# Patient Record
Sex: FEMALE | Race: BLACK OR AFRICAN AMERICAN | Employment: OTHER | URBAN - METROPOLITAN AREA
[De-identification: names, ages, dates, MRNs, and addresses within clinical notes are randomized per-mention and may not be internally consistent; named-entity substitution may affect disease eponyms.]

---

## 2018-01-09 ENCOUNTER — APPOINTMENT (OUTPATIENT)
Dept: GENERAL RADIOLOGY | Age: 73
DRG: 270 | End: 2018-01-09
Attending: EMERGENCY MEDICINE
Payer: COMMERCIAL

## 2018-01-09 ENCOUNTER — APPOINTMENT (OUTPATIENT)
Dept: CT IMAGING | Age: 73
DRG: 270 | End: 2018-01-09
Attending: EMERGENCY MEDICINE
Payer: COMMERCIAL

## 2018-01-09 ENCOUNTER — HOSPITAL ENCOUNTER (INPATIENT)
Age: 73
LOS: 3 days | Discharge: HOME | DRG: 270 | End: 2018-01-12
Attending: EMERGENCY MEDICINE | Admitting: INTERNAL MEDICINE
Payer: COMMERCIAL

## 2018-01-09 DIAGNOSIS — I26.99 OTHER ACUTE PULMONARY EMBOLISM WITHOUT ACUTE COR PULMONALE (HCC): ICD-10-CM

## 2018-01-09 DIAGNOSIS — I26.99 OTHER PULMONARY EMBOLISM WITHOUT ACUTE COR PULMONALE, UNSPECIFIED CHRONICITY (HCC): Primary | ICD-10-CM

## 2018-01-09 PROBLEM — I82.402 ACUTE DEEP VEIN THROMBOSIS (DVT) OF LEFT LOWER EXTREMITY (HCC): Status: ACTIVE | Noted: 2018-01-09

## 2018-01-09 PROBLEM — R91.8 RIGHT LOWER LOBE LUNG MASS: Status: ACTIVE | Noted: 2018-01-09

## 2018-01-09 PROBLEM — I82.409 DVT (DEEP VENOUS THROMBOSIS) (HCC): Status: ACTIVE | Noted: 2018-01-09

## 2018-01-09 LAB
ANION GAP SERPL CALC-SCNC: 3 MMOL/L (ref 3–18)
APTT PPP: 140.9 SEC (ref 23–36.4)
ATRIAL RATE: 113 BPM
BASOPHILS # BLD: 0 K/UL (ref 0–0.06)
BASOPHILS # BLD: 0 K/UL (ref 0–0.1)
BASOPHILS NFR BLD: 0 % (ref 0–2)
BASOPHILS NFR BLD: 0 % (ref 0–2)
BNP SERPL-MCNC: 42 PG/ML (ref 0–900)
BUN SERPL-MCNC: 11 MG/DL (ref 7–18)
BUN/CREAT SERPL: 14 (ref 12–20)
CALCIUM SERPL-MCNC: 9.3 MG/DL (ref 8.5–10.1)
CALCULATED P AXIS, ECG09: 80 DEGREES
CALCULATED R AXIS, ECG10: -28 DEGREES
CALCULATED T AXIS, ECG11: 77 DEGREES
CHLORIDE SERPL-SCNC: 97 MMOL/L (ref 100–108)
CK MB CFR SERPL CALC: NORMAL % (ref 0–4)
CK MB SERPL-MCNC: <1 NG/ML (ref 5–25)
CK SERPL-CCNC: 105 U/L (ref 26–192)
CO2 SERPL-SCNC: 34 MMOL/L (ref 21–32)
CREAT SERPL-MCNC: 0.79 MG/DL (ref 0.6–1.3)
DIAGNOSIS, 93000: NORMAL
DIFFERENTIAL METHOD BLD: ABNORMAL
DIFFERENTIAL METHOD BLD: ABNORMAL
EOSINOPHIL # BLD: 0 K/UL (ref 0–0.4)
EOSINOPHIL # BLD: 0.1 K/UL (ref 0–0.4)
EOSINOPHIL NFR BLD: 0 % (ref 0–5)
EOSINOPHIL NFR BLD: 1 % (ref 0–5)
ERYTHROCYTE [DISTWIDTH] IN BLOOD BY AUTOMATED COUNT: 14.4 % (ref 11.6–14.5)
ERYTHROCYTE [DISTWIDTH] IN BLOOD BY AUTOMATED COUNT: 14.5 % (ref 11.6–14.5)
GLUCOSE SERPL-MCNC: 118 MG/DL (ref 74–99)
HCT VFR BLD AUTO: 36.9 % (ref 35–45)
HCT VFR BLD AUTO: 39.9 % (ref 35–45)
HGB BLD-MCNC: 12 G/DL (ref 12–16)
HGB BLD-MCNC: 13 G/DL (ref 12–16)
INR PPP: 1.1 (ref 0.8–1.2)
LYMPHOCYTES # BLD: 0.7 K/UL (ref 0.9–3.6)
LYMPHOCYTES # BLD: 1.7 K/UL (ref 0.9–3.6)
LYMPHOCYTES NFR BLD: 11 % (ref 21–52)
LYMPHOCYTES NFR BLD: 25 % (ref 21–52)
MCH RBC QN AUTO: 26.5 PG (ref 24–34)
MCH RBC QN AUTO: 26.7 PG (ref 24–34)
MCHC RBC AUTO-ENTMCNC: 32.5 G/DL (ref 31–37)
MCHC RBC AUTO-ENTMCNC: 32.6 G/DL (ref 31–37)
MCV RBC AUTO: 81.4 FL (ref 74–97)
MCV RBC AUTO: 82 FL (ref 74–97)
MONOCYTES # BLD: 0.6 K/UL (ref 0.05–1.2)
MONOCYTES # BLD: 0.6 K/UL (ref 0.05–1.2)
MONOCYTES NFR BLD: 8 % (ref 3–10)
MONOCYTES NFR BLD: 9 % (ref 3–10)
NEUTS SEG # BLD: 4.6 K/UL (ref 1.8–8)
NEUTS SEG # BLD: 5.5 K/UL (ref 1.8–8)
NEUTS SEG NFR BLD: 65 % (ref 40–73)
NEUTS SEG NFR BLD: 81 % (ref 40–73)
P-R INTERVAL, ECG05: 140 MS
PLATELET # BLD AUTO: 94 K/UL (ref 135–420)
PLATELET # BLD AUTO: 97 K/UL (ref 135–420)
PMV BLD AUTO: 9.7 FL (ref 9.2–11.8)
PMV BLD AUTO: 9.9 FL (ref 9.2–11.8)
POTASSIUM SERPL-SCNC: 3.9 MMOL/L (ref 3.5–5.5)
PROTHROMBIN TIME: 14 SEC (ref 11.5–15.2)
Q-T INTERVAL, ECG07: 324 MS
QRS DURATION, ECG06: 96 MS
QTC CALCULATION (BEZET), ECG08: 444 MS
RBC # BLD AUTO: 4.5 M/UL (ref 4.2–5.3)
RBC # BLD AUTO: 4.9 M/UL (ref 4.2–5.3)
SODIUM SERPL-SCNC: 134 MMOL/L (ref 136–145)
TROPONIN I SERPL-MCNC: <0.02 NG/ML (ref 0–0.04)
VENTRICULAR RATE, ECG03: 113 BPM
WBC # BLD AUTO: 6.8 K/UL (ref 4.6–13.2)
WBC # BLD AUTO: 7 K/UL (ref 4.6–13.2)

## 2018-01-09 PROCEDURE — 74011250636 HC RX REV CODE- 250/636: Performed by: EMERGENCY MEDICINE

## 2018-01-09 PROCEDURE — 85025 COMPLETE CBC W/AUTO DIFF WBC: CPT | Performed by: EMERGENCY MEDICINE

## 2018-01-09 PROCEDURE — 80048 BASIC METABOLIC PNL TOTAL CA: CPT | Performed by: EMERGENCY MEDICINE

## 2018-01-09 PROCEDURE — 74011250637 HC RX REV CODE- 250/637: Performed by: INTERNAL MEDICINE

## 2018-01-09 PROCEDURE — 96365 THER/PROPH/DIAG IV INF INIT: CPT

## 2018-01-09 PROCEDURE — 82550 ASSAY OF CK (CPK): CPT | Performed by: EMERGENCY MEDICINE

## 2018-01-09 PROCEDURE — 83880 ASSAY OF NATRIURETIC PEPTIDE: CPT | Performed by: EMERGENCY MEDICINE

## 2018-01-09 PROCEDURE — 74177 CT ABD & PELVIS W/CONTRAST: CPT

## 2018-01-09 PROCEDURE — 36415 COLL VENOUS BLD VENIPUNCTURE: CPT | Performed by: EMERGENCY MEDICINE

## 2018-01-09 PROCEDURE — 71045 X-RAY EXAM CHEST 1 VIEW: CPT

## 2018-01-09 PROCEDURE — 74011636320 HC RX REV CODE- 636/320: Performed by: EMERGENCY MEDICINE

## 2018-01-09 PROCEDURE — 65660000000 HC RM CCU STEPDOWN

## 2018-01-09 PROCEDURE — 85730 THROMBOPLASTIN TIME PARTIAL: CPT | Performed by: EMERGENCY MEDICINE

## 2018-01-09 PROCEDURE — 71275 CT ANGIOGRAPHY CHEST: CPT

## 2018-01-09 PROCEDURE — 94761 N-INVAS EAR/PLS OXIMETRY MLT: CPT

## 2018-01-09 PROCEDURE — 93005 ELECTROCARDIOGRAM TRACING: CPT

## 2018-01-09 PROCEDURE — 99285 EMERGENCY DEPT VISIT HI MDM: CPT

## 2018-01-09 PROCEDURE — 93971 EXTREMITY STUDY: CPT

## 2018-01-09 PROCEDURE — 85610 PROTHROMBIN TIME: CPT | Performed by: EMERGENCY MEDICINE

## 2018-01-09 RX ORDER — SODIUM CHLORIDE 0.9 % (FLUSH) 0.9 %
5-10 SYRINGE (ML) INJECTION AS NEEDED
Status: DISCONTINUED | OUTPATIENT
Start: 2018-01-09 | End: 2018-01-12 | Stop reason: HOSPADM

## 2018-01-09 RX ORDER — DOCUSATE SODIUM 100 MG/1
100 CAPSULE, LIQUID FILLED ORAL 2 TIMES DAILY
Status: DISCONTINUED | OUTPATIENT
Start: 2018-01-09 | End: 2018-01-12 | Stop reason: HOSPADM

## 2018-01-09 RX ORDER — ACETAMINOPHEN 325 MG/1
650 TABLET ORAL
Status: DISCONTINUED | OUTPATIENT
Start: 2018-01-09 | End: 2018-01-11 | Stop reason: SDUPTHER

## 2018-01-09 RX ORDER — ERGOCALCIFEROL 1.25 MG/1
50000 CAPSULE ORAL
COMMUNITY

## 2018-01-09 RX ORDER — AMLODIPINE BESYLATE 5 MG/1
5 TABLET ORAL DAILY
COMMUNITY

## 2018-01-09 RX ORDER — HYDROCODONE BITARTRATE AND ACETAMINOPHEN 7.5; 325 MG/1; MG/1
1 TABLET ORAL
Status: DISCONTINUED | OUTPATIENT
Start: 2018-01-09 | End: 2018-01-12 | Stop reason: HOSPADM

## 2018-01-09 RX ORDER — HEPARIN SODIUM 1000 [USP'U]/ML
80 INJECTION, SOLUTION INTRAVENOUS; SUBCUTANEOUS ONCE
Status: COMPLETED | OUTPATIENT
Start: 2018-01-09 | End: 2018-01-09

## 2018-01-09 RX ORDER — HEPARIN SODIUM 10000 [USP'U]/100ML
18-36 INJECTION, SOLUTION INTRAVENOUS
Status: DISCONTINUED | OUTPATIENT
Start: 2018-01-09 | End: 2018-01-12

## 2018-01-09 RX ORDER — MORPHINE SULFATE 2 MG/ML
2 INJECTION, SOLUTION INTRAMUSCULAR; INTRAVENOUS
Status: DISCONTINUED | OUTPATIENT
Start: 2018-01-09 | End: 2018-01-12 | Stop reason: HOSPADM

## 2018-01-09 RX ORDER — DIPHENHYDRAMINE HCL 25 MG
25 CAPSULE ORAL
Status: DISCONTINUED | OUTPATIENT
Start: 2018-01-09 | End: 2018-01-12 | Stop reason: HOSPADM

## 2018-01-09 RX ORDER — ONDANSETRON 4 MG/1
4 TABLET, ORALLY DISINTEGRATING ORAL
Status: DISCONTINUED | OUTPATIENT
Start: 2018-01-09 | End: 2018-01-11 | Stop reason: SDUPTHER

## 2018-01-09 RX ORDER — SODIUM CHLORIDE 0.9 % (FLUSH) 0.9 %
5-10 SYRINGE (ML) INJECTION EVERY 8 HOURS
Status: DISCONTINUED | OUTPATIENT
Start: 2018-01-09 | End: 2018-01-12 | Stop reason: HOSPADM

## 2018-01-09 RX ADMIN — Medication 10 ML: at 22:01

## 2018-01-09 RX ADMIN — IOPAMIDOL 78 ML: 755 INJECTION, SOLUTION INTRAVENOUS at 13:20

## 2018-01-09 RX ADMIN — HEPARIN SODIUM AND DEXTROSE 18 UNITS/KG/HR: 10000; 5 INJECTION INTRAVENOUS at 13:55

## 2018-01-09 RX ADMIN — SODIUM CHLORIDE 1000 ML: 900 INJECTION, SOLUTION INTRAVENOUS at 13:58

## 2018-01-09 RX ADMIN — HEPARIN SODIUM 4900 UNITS: 1000 INJECTION, SOLUTION INTRAVENOUS; SUBCUTANEOUS at 13:56

## 2018-01-09 NOTE — ED TRIAGE NOTES
Patient has history of blood clots and is in today with left leg pain and swelling x 2 days. Left calf and ankle swelling, calf cold to touch.   Difficulty with ambulation

## 2018-01-09 NOTE — PROGRESS NOTES
ED called about pt for acute onset of LLE swelling. Pt reportedly has history of DVT in the remote past.   Not currently on anticoagulation. Ultrasound shows -   1. Extensive acute occlusive deep venous thrombosis identified in the  common femoral, femoral,  popliteal, soleal, gastrocnemius and deep  femoral veins. 2. Deep veins visualized include the common femoral, femoral, deep  femoral, popliteal, gastrocnemius, soleal, posterior tibial and  peroneal veins. 3. Acute occlusive superficial venous thrombosis identified in the  great saphenous at the sapheno-femoral junction. 4. Superficial veins visualized include the proximal great saphenous  vein. 5. The left posterior tibial and anterior tibial arteries are patent  with multiphasic flow. 6. No evidence of deep vein thrombosis in the contralateral common  femoral vein. Pt also with bilateral PE. Started on Heparin drip in ED and being admitted under Hospitalist team.   Low platelet count noted. Will need to monitor closely with Heparin. Renal function stable. Per ED physician pt with full sensation and motion. Good arterial flow on doppler and palpable pedal pulses. Dr Longo Ast alerted. Agree with Heparin drip. Elevate leg for swelling. Will discuss possible venogram with lysis procedure with patient. Could perform Thursday if patient agreeable. Full consult to follow.

## 2018-01-09 NOTE — IP AVS SNAPSHOT
[address] [address] 
[phone] Patient: Columba Michael MRN: [id number] :1945 About your hospitalization You were admitted on:  2018 You [facility] You were discharged on:  2018 Why you were hospitalized Your primary diagnosis was:  Pulmonary Emboli (Hcc) Your diagnoses also included:  Right Lower Lobe Lung Mass, Acute Deep Vein Thrombosis (Dvt) Of Left Lower Extremity (Hcc) Follow-up Information Follow up With Details Comments Contact Info Jb López MD On 2018 3:30 PM [address] [facility] [address] 
[phone] Sheryl Velasco MD Go on 2018 @ 11:45  [address] Prasant[address] D 
[facility]  [facility] 
[phone] Judith Spring MD Go on 2018 @ 10:15 [address] [facility] [address] [address] 
[phone] Your Scheduled Appointments 2018 11:45 AM EST HOSPITAL DISCHARGE with Sheryl Velasco MD  
[facility] [address] [place] [address] 23[address] [facility] Charla Arevalo [address] 200 [facility]  
[phone] 2018 10:15 AM EST Follow Up with Judith Spring MD  
[address]  [address] ([address] [place] [address]) 235 [facility], [address] [address]  
[phone] Discharge Orders None A check evangelina indicates which time of day the medication should be taken. My Medications START taking these medications Instructions Each Dose to Equal  
 Morning Noon Evening Bedtime * apixaban 5 mg tablet Commonly known as:  Ronnie Uribe Your last dose was: Your next dose is: Take 2 Tabs by mouth two (2) times a day. Indications: pulmonary thromboembolism 10 mg  
    
   
   
   
  
 * apixaban 5 mg tablet Commonly known as:  Caleb Means Start taking on:  1/19/2018 Your last dose was: Your next dose is: Take 1 Tab by mouth two (2) times a day. Indications: pulmonary thromboembolism 5 mg  
    
   
   
   
  
 oxyCODONE-acetaminophen 5-325 mg per tablet Commonly known as:  PERCOCET Your last dose was: Your next dose is: Take 1 Tab by mouth every four (4) hours as needed. Max Daily Amount: 6 Tabs. 1 Tab * Notice: This list has 2 medication(s) that are the same as other medications prescribed for you. Read the directions carefully, and ask your doctor or other care provider to review them with you. CONTINUE taking these medications Instructions Each Dose to Equal  
 Morning Noon Evening Bedtime  
 amLODIPine 5 mg tablet Commonly known as:  Tasia Moreau Your last dose was: Your next dose is: Take 5 mg by mouth daily. 5 mg VITAMIN D2 50,000 unit capsule Generic drug:  ergocalciferol Your last dose was: Your next dose is: Take 50,000 Units by mouth every seven (7) days. [address] Units Where to Get Your Medications Information on where to get these meds will be given to you by the nurse or doctor. ! Ask your nurse or doctor about these medications  
  apixaban 5 mg tablet  
 apixaban 5 mg tablet  
 oxyCODONE-acetaminophen 5-325 mg per tablet Discharge Instructions Discharge Instructions Patient: Alek Reynaga MRN: [id number]  [facility]: [id number] YOB: 1945  Age: 71 y.o. Sex: female DOA: 1/9/2018 LOS:  LOS: 3 days   Discharge Date: DIET:  Cardiac Diet ACTIVITY: Activity as tolerated ADDITIONAL INFORMATION: If you experience any of the following symptoms but not limited to Fever, chills, nausea, vomiting, diarrhea, change in mentation, falling, bleeding, shortness of breath, chest pain, please call your primary care physician or return to the emergency room if you cannot get hold of your doctor:  
 
FOLLOW UP CARE: 
Dr. Nunu Parsons MD in 7-10 days. Please call and set up an appointment. Dr. Yahaira MILLERx in 2 week alexander Cosme in 2 week Fred Portillo MD 
1/12/2018 1:52 PM 
 
 
 
 
 
  
  
  
[employer] Announcement We are excited to announce that we are making your provider's discharge notes available to you in [employer]. You will see these notes when they are completed and signed by the physician that discharged you from your recent hospital stay. If you have any questions or concerns about any information you see in [employer], please call the Health Information Department where you were seen or reach out to your Primary Care Provider for more information about your plan of care. Introducing \[facility] [place] [facility]\"" & HEALTH SERVICES! Seamus Finney introduces [employer] patient portal. Now you can access parts of your medical record, email your doctor's office, and request medication refills online. 1. In your internet browser, go to [email or web address] 2. Click on the First Time User? Click Here link in the Sign In box. You will see the New Member Sign Up page. 3. Enter your [employer] Access Code exactly as it appears below. You will not need to use this code after youve completed the sign-up process. If you do not sign up before the expiration date, you must request a new code. · [employer] Access Code: [email or web address] Expires: 4/12/2018  3:15 PM 
 
4. Enter the last four digits of your Social Security Number (xxxx) and Date of Birth (mm/dd/yyyy) as indicated and click Submit. You will be taken to the next sign-up page. 5. Create a [employer] ID. This will be your [employer] login ID and cannot be changed, so think of one that is secure and easy to remember. 6. Create a [employer] password. You can change your password at any time. 7. Enter your Password Reset Question and Answer. This can be used at a later time if you forget your password. 8. Enter your e-mail address. You will receive e-mail notification when new information is available in [address] 9. Click Sign Up. You can now view and download portions of your medical record. 10. Click the Download Summary menu link to download a portable copy of your medical information. If you have questions, please visit the Frequently Asked Questions section of the [employer] website. Remember, [employer] is NOT to be used for urgent needs. For medical emergencies, dial 911. Now available from your iPhone and Android! Unresulted Labs-Please follow up with your PCP about these lab tests Order Current Status FACTOR II  DNA ANALYSIS In process FACTOR V LEIDEN In process LUPUS ANTICOAGULANT PANEL W/ REFLEX In process PHOSPHATIDYLSERINE ABS In process Providers Seen During Your Hospitalization Provider Specialty Primary office phone Kathrine Knox MD Emergency Medicine [phone] Rolf Machado DO Hospitalist [phone] Irineo Gonzales MD Internal Medicine [phone] Your Primary Care Physician (PCP) Primary Care Physician Office Phone Office Fax TEE, [facility] [phone] You are allergic to the following No active allergies Recent Documentation Height Weight BMI Smoking Status 1.803 m 63.2 kg 19.43 kg/m2 Never Smoker Emergency Contacts Name Discharge Info Relation Home Work Mobile Shayna Walker DISCHARGE CAREGIVER [3] Child [2] [phone] Patient Belongings The following personal items are in your possession at time of discharge: 
  Dental Appliances: None  Visual Aid: At bedside, Glasses Please provide this summary of care documentation to your next provider. Signatures-by signing, you are acknowledging that this After Visit Summary has been reviewed with you and you have received a copy. Patient Signature:  ____________________________________________________________ Date:  ____________________________________________________________  
  
Kaleb Kin Provider Signature:  ____________________________________________________________ Date:  ____________________________________________________________

## 2018-01-09 NOTE — ED PROVIDER NOTES
EMERGENCY DEPARTMENT HISTORY AND PHYSICAL EXAM    10:43 AM      Date: 1/9/2018  Patient Name: dAis Irving    History of Presenting Illness     Chief Complaint   Patient presents with    Leg Pain         History Provided By: Patient    Chief Complaint: leg pain  Duration: 3 Days  Timing:  Worsening  Location: left lower and upper leg  Quality: Tightness and throbbing  Severity: 8 out of 10  Modifying Factors:   Associated Symptoms: swelling      Additional History (Context): Adis Irving is a 71 y.o. female with hypertension who presents with left lower leg pain and swelling that began 3 days ago. Pain and swelling extend to her upper leg and she notes that the leg feels cold. She notes a history of blood clots in her leg. She denies abdominal pain, SOB, CP and ankle pain. She denies any recent extended travel, surgery or cancer and does not take estrogen-containing medication. No other symptoms or concerns were expressed. PCP: Jamari Mcgill MD        Past History     Past Medical History:  Past Medical History:   Diagnosis Date    Essential hypertension, benign     Nonspecific abnormal electrocardiogram (ECG) (EKG)     Undiagnosed cardiac murmurs     Unspecified congenital defect of septal closure        Past Surgical History:  No past surgical history on file. Family History:  No family history on file. Social History:  Social History   Substance Use Topics    Smoking status: Never Smoker    Smokeless tobacco: Not on file    Alcohol use No       Allergies:  No Known Allergies      Review of Systems       Review of Systems   Constitutional: Negative for chills and fever. HENT: Negative for rhinorrhea. Respiratory: Negative for shortness of breath. Cardiovascular: Negative for chest pain. Gastrointestinal: Negative for abdominal pain, nausea and vomiting. Endocrine: Negative for polyuria. Genitourinary: Negative for dysuria. Musculoskeletal: Positive for myalgias.  Negative for back pain. (+) leg swelling (left)   Skin: Negative for rash. Neurological: Negative for headaches. Physical Exam     Patient Vitals for the past 12 hrs:   Temp Pulse Resp BP SpO2   01/09/18 1430 - 90 21 122/65 -   01/09/18 1415 - 88 16 112/65 -   01/09/18 1400 - 86 (!) 34 124/60 97 %   01/09/18 1345 - 89 16 112/59 -   01/09/18 1245 - 85 27 125/68 100 %   01/09/18 1034 98.7 °F (37.1 °C) (!) 117 18 145/90 98 %         Physical Exam   Constitutional: She is oriented to person, place, and time. She appears well-developed and well-nourished. Speaking in full sentences   HENT:   Head: Normocephalic and atraumatic. Eyes: Conjunctivae are normal. Pupils are equal, round, and reactive to light. Neck: Normal range of motion. Neck supple. Cardiovascular: Regular rhythm. Pulmonary/Chest: Effort normal and breath sounds normal. No respiratory distress. She has no wheezes. Abdominal: Soft. Bowel sounds are normal. She exhibits no distension. There is no tenderness. There is no rebound and no guarding. Musculoskeletal: Normal range of motion. Diffuse swelling of entire left lower extremity  Slightly colder to touch  DP and PT pulses and sensation intact    Neurological: She is alert and oriented to person, place, and time. Skin: Skin is warm and dry. Psychiatric: She has a normal mood and affect. Thought content normal.   Nursing note and vitals reviewed.         Diagnostic Study Results     Labs -  Recent Results (from the past 12 hour(s))   CBC WITH AUTOMATED DIFF    Collection Time: 01/09/18 10:47 AM   Result Value Ref Range    WBC 6.8 4.6 - 13.2 K/uL    RBC 4.90 4.20 - 5.30 M/uL    HGB 13.0 12.0 - 16.0 g/dL    HCT 39.9 35.0 - 45.0 %    MCV 81.4 74.0 - 97.0 FL    MCH 26.5 24.0 - 34.0 PG    MCHC 32.6 31.0 - 37.0 g/dL    RDW 14.5 11.6 - 14.5 %    PLATELET 94 (L) 313 - 420 K/uL    MPV 9.7 9.2 - 11.8 FL    NEUTROPHILS 81 (H) 40 - 73 %    LYMPHOCYTES 11 (L) 21 - 52 %    MONOCYTES 8 3 - 10 % EOSINOPHILS 0 0 - 5 %    BASOPHILS 0 0 - 2 %    ABS. NEUTROPHILS 5.5 1.8 - 8.0 K/UL    ABS. LYMPHOCYTES 0.7 (L) 0.9 - 3.6 K/UL    ABS. MONOCYTES 0.6 0.05 - 1.2 K/UL    ABS. EOSINOPHILS 0.0 0.0 - 0.4 K/UL    ABS.  BASOPHILS 0.0 0.0 - 0.1 K/UL    DF AUTOMATED     PROTHROMBIN TIME + INR    Collection Time: 01/09/18 10:47 AM   Result Value Ref Range    Prothrombin time 14.0 11.5 - 15.2 sec    INR 1.1 0.8 - 1.2     METABOLIC PANEL, BASIC    Collection Time: 01/09/18 10:47 AM   Result Value Ref Range    Sodium 134 (L) 136 - 145 mmol/L    Potassium 3.9 3.5 - 5.5 mmol/L    Chloride 97 (L) 100 - 108 mmol/L    CO2 34 (H) 21 - 32 mmol/L    Anion gap 3 3.0 - 18 mmol/L    Glucose 118 (H) 74 - 99 mg/dL    BUN 11 7.0 - 18 MG/DL    Creatinine 0.79 0.6 - 1.3 MG/DL    BUN/Creatinine ratio 14 12 - 20      GFR est AA >60 >60 ml/min/1.73m2    GFR est non-AA >60 >60 ml/min/1.73m2    Calcium 9.3 8.5 - 10.1 MG/DL   CARDIAC PANEL,(CK, CKMB & TROPONIN)    Collection Time: 01/09/18 10:47 AM   Result Value Ref Range     26 - 192 U/L    CK - MB <1.0 <3.6 ng/ml    CK-MB Index  0.0 - 4.0 %     CALCULATION NOT PERFORMED WHEN RESULT IS BELOW LINEAR LIMIT    Troponin-I, Qt. <0.02 0.0 - 0.045 NG/ML   NT-PRO BNP    Collection Time: 01/09/18 10:47 AM   Result Value Ref Range    NT pro-BNP 42 0 - 900 PG/ML   EKG, 12 LEAD, INITIAL    Collection Time: 01/09/18 10:55 AM   Result Value Ref Range    Ventricular Rate 113 BPM    Atrial Rate 113 BPM    P-R Interval 140 ms    QRS Duration 96 ms    Q-T Interval 324 ms    QTC Calculation (Bezet) 444 ms    Calculated P Axis 80 degrees    Calculated R Axis -28 degrees    Calculated T Axis 77 degrees    Diagnosis       Sinus tachycardia  Biatrial enlargement  Anterior infarct , age undetermined  Vs lead placement error  Abnormal ECG  No previous ECGs available  Confirmed by Coleman Menon MD, ----- ([id number]) on 1/9/2018 1:03:59 PM         Radiologic Studies -   Cta Chest W Or W Wo Cont    Addendum Date: 1/9/2018    Addendum: Called [facility] and informed Dr. Leanne Millan with findings regarding PE and right lower lobe mass at 13:57. Result Date: 1/9/2018  PROCEDURE:  CTA Chest with Contrast (CT Pulmonary Study for PE). INDICATION:  Chest pain. Diagnosed with DVT in the left leg today. COMPARISON:  None. TECHNIQUE:  Helical volumetric 2.5 mm sections of the chest are obtained with CT pulmonary angiogram protocol. Subsequently, sagittal and coronal multiplanar reconstruction images are obtained. Maximum intensity projection images are generated to better delineate the pulmonary vasculature, differentiate between the pulmonary arteries and veins and to increase sensitivity to pulmonary emboli.  - IV contrast:  100 mL Isovue-370. - Radiation dose:   mGy-cm. - Note:  Radiation dose optimization techniques are utilized as appropriate to the exam, with combination of automated exposure control, adjustment of the mA and/or kV according to patient's size (Including appropriate matching for site-specific examinations), or use of iterative reconstruction technique. FINDINGS: Pulmonary Arteries:  - The pulmonary artery opacification is diagnostic in quality. - In the left interlobar artery, there is a focal filling defect with the bulk of filling defect extending into the left lower lobar intersegmental artery and extending into anterior and middle basal segmental arteries. A much more subtle filling defect is suggested in the right lower lobe internal segmental artery extending into the medial basal and posterior basal segmental arteries. Lung, Pleura, Airways:  - 3.2 x 1.5 cm bilobed pleura-abutting mass in the right lower lobe anterior basal segment (axial #47-54). - Possible peripheral scarring in the right upper lobe with curvilinear bandlike density (axial #16).  - In the left lower lobe, a branching density is identified measuring about 1.4 x 0.5 cm (axial #38-39, coronal #28), possibly representing mucus impaction in peripheral subsegmental bronchial branch. - Tiny left pleural effusion. Mediastinum, Michelle:  No adenopathy. Aorta:  No evidence of aortic dissection or aneurysm. Base of neck:  No acute findings. Axillae:  Unremarkable. Abdomen:  No acute findings. Skeletal Structures:  No acute findings. IMPRESSION:      1. Bilateral pulmonary embolism. Greater burden on the left side. 2.  Right lower lobe anterior basal segment peripheral pleura-abutting mass. Recommend PET-CT for further delineation. 3.  Branching peripheral tubular density in the left lower lobe, probably a focus of mucus impaction. Ct Abd Pelv W Cont    Result Date: 1/9/2018  PROCEDURE:  CT Abdomen, Pelvis with Contrast. INDICATION:  DVT diagnosed today. Abdominal pain with lower extremity pain. Assessment for extent of IVC and iliac vein involvement of DVT. COMPARISON:  CTA chest obtained concurrently. TECHNIQUE:  Helical volumetric CT imaging of the abdomen and pelvis is performed at 5 mm axial sections following IV contrast administration. Coronal and sagittal multiplanar reconstruction images are generated for improved anatomic delineation. - IV contrast dose:  100 mL Isovue-300. - Radiation dose:   mGy-cm. - All CT scans at this facility are performed using dose optimization technique as appropriate to the performed exam, to include automated exposure control, adjustment of the mA and/or kV according to patient's size (Including appropriate matching for site-specific examinations), or use of iterative reconstruction technique. FINDINGS: ABDOMEN Lung Bases:  3.5 x 1.5 cm bilobed or trilobed peripheral right lower lobe anterior basal segment mass abutting the pleura. Tiny left posterior base pleural effusion. Liver, Pancreas, Spleen, Gallbladder, Adrenal Glands:  Unremarkable. Kidneys:  0.5 x 0.4 cm hypodensity in the left kidney (axial #275).   Favor a small renal cyst. Gastrointestinal Tract, Abdominal Wall, Mesentery:  - Unremarkable stomach. - No acute findings along the small bowel. No small bowel obstruction.  - No convincing evidence of acute appendicitis. - No acute diverticulitis or colitis. - No mesenteric adenopathy. Retroperitoneum:  No adenopathy. Vascular:  - Left femoral vein DVT with asymmetric enlargement of the left femoral vein and lack of venous enhancement (right femoral vein adequately opacified. The DVT extends into the left iliac vein up to the distal common iliac vein (axial #308). The IVC is patent. Right iliac veins are also patent. - Non-aneurysmal aorta. PELVIS Urinary Bladder:  Unremarkable. Uterus, Adnexa:  Fibroid uterus. No adnexal mass. Rectum:  Unremarkable. Pelvic sidewall:  No adenopathy. No free fluid. Bones:  No acute osseous findings. IMPRESSION: 1. Left femoral vein DVT extending to the common iliac vein. No DVT in the IVC - Right lower lobe mass. May correlate with hypercoagulable state. 2.  No acute findings along the gastrointestinal tract. 3.  Fibroid uterus. 4.  Left renal hypodensity, favor renal cortical cyst.    Xr Chest Port    Result Date: 1/9/2018  PORTABLE CHEST RADIOGRAPH CPT CODE: 58786 INDICATION: Tachycardia. COMPARISON: None. FINDINGS: The trachea is midline. The cardiomediastinal silhouette is within normal limits. The pulmonary vascular markings are unremarkable. The lungs are hyperexpanded. Nonspecific density measuring approximately 3.3 x 1.5 cm noted along the peripheral right lower hemithorax. No consolidation. No effusion or pneumothorax. Osseous structures are grossly intact. IMPRESSION: 1. No acute cardiopulmonary process. 2. Nonspecific nodular density projecting along the right lower hemithorax. Further evaluation with CT would be beneficial.    DUPLEX LOWER EXT VENOUS LEFT  Left leg :  1.  Extensive acute occlusive deep venous thrombosis identified in the  common femoral, femoral,  popliteal, soleal, gastrocnemius and deep  femoral veins. 2. Deep veins visualized include the common femoral, femoral, deep  femoral, popliteal, gastrocnemius, soleal, posterior tibial and  peroneal veins. 3. Acute occlusive superficial venous thrombosis identified in the  great saphenous at the sapheno-femoral junction. 4. Superficial veins visualized include the proximal great saphenous  vein. 5. The left posterior tibial and anterior tibial arteries are patent  with multiphasic flow. 6. No evidence of deep vein thrombosis in the contralateral common  femoral vein. Medical Decision Making   I am the first provider for this patient. I reviewed the vital signs, available nursing notes, past medical history, past surgical history, family history and social history. Vital Signs-Reviewed the patient's vital signs. Pulse Oximetry Analysis -  98% on room air (Interpretation)    EKG: Interpreted by the EP. Time Interpreted: 10:55   Rate: 113   Rhythm: Sinus Tachycardia    Interpretation: RBBB in V1 and V2. No STEMI. Records Reviewed: Nursing Notes and Old Medical Records (Time of Review: 10:43 AM)    Provider Notes (Medical Decision Making): Reevaluated, left leg is warm to touch - no longer cold. Patient with positive DVT study and normal arterial wave forms. Performed CTA chest and abdomen study to assess for PE which is possible and burden of left lower extremity clot. Started on heparin drip and will be admitted to telemetry. Hemodynamically stable. Vascular surgery consulted and will see patient given large clot burden. ED Course: Progress Notes, Reevaluation, and Consults:    12:19 PM  Discussed patient with [address] , who states that she is positive for DVT in all veins. Normal waveforms in all arteries in the left leg. Consult:  Discussed care with Dr. Patricia Jorge, Hospitalist. Standard discussion; including history of patients chief complaint, available diagnostic results, and treatment course. Agrees to admit. 2:38 PM, 1/9/2018     3:00 PM  Spoke with PA for Dr. Mono Nolan, Vascular surgery. They will evaluate her for possible lysis procedure. Critical Care:  I have spent 35 minutes of critical care time involved in lab review, consultations with specialist, family decision-making, and documentation. During this entire length of time I was immediately available to the patient. Critical Care: The reason for providing this level of medical care for this critically ill patient was due a critical illness that impaired one or more vital organ systems such that there was a high probability of imminent or life threatening deterioration in the patients condition. This care involved high complexity decision making to assess, manipulate, and support vital system functions, to treat this degreee vital organ system failure and to prevent further life threatening deterioration of the patients condition. For Hospitalized Patients:    1. Hospitalization Decision Time:  The decision to hospitalize the patient was made by Dr. Charley Pina at 2:30 PM on 1/9/2018    Diagnosis     Clinical Impression:   1. Other pulmonary embolism without acute cor pulmonale, unspecified chronicity (HCC)        Disposition: Admit    Follow-up Information     None           Patient's Medications    No medications on file     _______________________________    Attestations:  Scribe Attestation     Frankie Romero acting as a scribe for and in the presence of Charley Pina MD      January 09, 2018 at 10:54 AM       Provider Attestation:      I personally performed the services described in the documentation, reviewed the documentation, as recorded by the scribe in my presence, and it accurately and completely records my words and actions.  January 09, 2018 at 10:54 AM - Charley Pina MD    _______________________________

## 2018-01-09 NOTE — ED NOTES
TRANSFER - OUT REPORT:    Verbal report given to DEANA Gonzalez(name) on Benita Hinkle  being transferred to [address] [place] [address](unit) for routine progression of care       Report consisted of patients Situation, Background, Assessment and   Recommendations(SBAR). Information from the following report(s) SBAR was reviewed with the receiving nurse. Lines:   Peripheral IV 01/09/18 Right Antecubital (Active)   Site Assessment Clean, dry, & intact 1/9/2018 11:15 AM   Phlebitis Assessment 0 1/9/2018 11:15 AM   Infiltration Assessment 0 1/9/2018 11:15 AM   Dressing Status Clean, dry, & intact 1/9/2018 11:15 AM   Dressing Type 4 X 4;Tape;Transparent 1/9/2018 11:15 AM   Hub Color/Line Status Pink;Flushed;Patent 1/9/2018 11:15 AM   Action Taken Blood drawn 1/9/2018 11:15 AM        Opportunity for questions and clarification was provided.       Patient transported with:   Registered Nurse

## 2018-01-09 NOTE — PROCEDURES
[facility]  *** [facility] ***    Name: Lopez Mcdonough  MRN: [id number]  : 08 Dec 1945  HIS Order #: [id number]  [id number] [facility] Visit #: [id number]  Date: 2018    TYPE OF TEST: Peripheral Venous Testing    REASON FOR TEST  Pain in limb, Limb swelling    Left Leg:-  Deep venous thrombosis:           Yes  Proximal extent of thrombus:      Common Femoral  Superficial venous thrombosis:    Yes  Deep venous insufficiency:        Not examined  Superficial venous insufficiency: Not examined      INTERPRETATION/FINDINGS  Left leg :  1. Extensive acute occlusive deep venous thrombosis identified in the  common femoral, femoral,  popliteal, soleal, gastrocnemius and deep  femoral veins. 2. Deep veins visualized include the common femoral, femoral, deep  femoral, popliteal, gastrocnemius, soleal, posterior tibial and  peroneal veins. 3. Acute occlusive superficial venous thrombosis identified in the  great saphenous at the sapheno-femoral junction. 4. Superficial veins visualized include the proximal great saphenous  vein. 5. The left posterior tibial and anterior tibial arteries are patent  with multiphasic flow. 6. No evidence of deep vein thrombosis in the contralateral common  femoral vein. ADDITIONAL COMMENTS  Results reported to Dr. [place] Ji at 7102. I have personally reviewed the data relevant to the interpretation of  this  study. TECHNOLOGIST: Leanna Soto RVT  Signed: 2018 12:21 PM    PHYSICIAN: Byron Bhakta D.O.   Signed: 2018 12:33 PM

## 2018-01-09 NOTE — Clinical Note
Status[de-identified] Inpatient [101] Type of Bed: Telemetry [19] Inpatient Hospitalization Certified Necessary for the Following Reasons: 9. Other (further clarification in H&P documentation) Admitting Diagnosis: Pulmonary embolism ([facility] [191143] Admitting Diagnosis: DVT (deep venous thrombosis) ([facility].) [[id number]] Admitting Physician: Josefina Pickering Attending Physician: Josefina Pickering Estimated Length of Stay: 3-4 Midnights Discharge Plan[de-identified] Home with Office Follow-up

## 2018-01-09 NOTE — H&P
History and Physical    Cheryl Burns  YOB: 1945,  71y.o. years old  MRN: [id number]   CSN: [id number]  Admission Date: 1/9/2018  Primary Care Provider: Jose Eduardo Bueno MD      CHIEF COMPLAINT: Left leg pain    HISTORY OF PRESENT ILLNESS:  Ms. Claudia Vivarer is a/an 71 y.o. non cigarette smoking hypertensive female with remote history including DVT, who presents for evaluation of 3 days duration of LLE pain and edema. There is no associated dyspnea, hemoptysis, pleuritic pain, coagulopathy or established thrombophilia. Past Medical History:   Diagnosis Date    Essential hypertension, benign     Nonspecific abnormal electrocardiogram (ECG) (EKG)     Undiagnosed cardiac murmurs     Unspecified congenital defect of septal closure           No past surgical history on file. MEDICATION     Current Facility-Administered Medications   Medication Dose Route Frequency Provider Last Rate Last Dose    heparin 25,000 units in D5W 250 ml infusion  18-36 Units/kg/hr IntraVENous TITRATE Landon Herron MD 11 mL/hr at 01/09/18 1355 18 Units/kg/hr at 01/09/18 1355       ALLERGIES   No Known Allergies    FAMILY HISTORY   family history is not on file. SOCIAL HISTORY    reports that she has never smoked. She does not have any smokeless tobacco history on file. She reports that she does not drink alcohol. REVIEW OF SYSTEMS   14 point review of systems was undertaken with responses as indicated in HPI; otherwise, noncontributory to this admission. OBJECTIVE   PHYSICAL EXAMINATION   Temp:  [98.7 °F (37.1 °C)]   Pulse (Heart Rate):  []   BP: (112-145)/(59-90)   Resp Rate:  [16-34]   O2 Sat (%):  [97 %-100 %]   Weight:  [61.2 kg (135 lb)]      GENERAL APPEARANCE. Clean, marginally nourished, NAD.   EYES. Inspection of Conjunctiva and Lids:  Clear without icterus; conjunctiva pink; lids are normal.  Examination of Pupils and Irises. Pupils round/appropriate for history, normal light reactive. EARS, NOSE, MOUTH, AND THROAT. External Inspection of Ears and Nose. No scars, lesions, deformities or masses. Assessment of Hearing:  Within normal limits for forced whisper at 5 feet. Inspection of Nasal Mucosa, Septum and Turbinates: Nares patent. Inspection of Lips, Teeth and Gums. No infected gums or abscesses. Dentition is suboptimal.  Oropharnyx:   Patent without evidence of airway obstruction. Subglossal vessels are without pallor. NECK. Supple, no masses, trachea is midline and freely moveable. Thyroid:  No tenderness noted. RESPIRATORY/CHEST. Assessment of Respiratory Effort. Chest moving equally on both sides. No retraction or stridor. There is no deformity considered to be clinically obstructive or restrictive. Auscultation of Lungs. Breath sounds are well heard. No wheezing, tubular sounds, crackles, or rubs noted posteriorly or anteriorly. The expiratory to inspiratory ratio is normal at approximately 2:1.         CARDIOVASCULAR. Ascultation of Heart. Regular rate and rhythm without pathologic murmur, normal S1 S2, negative rubs, clicks or gallops. Carotid Arteries. No bruits or thrills appreciated. The upstroke is brisk and symmetrical, bilaterally. Abdominal Aorta. No bruits or pastille masses palpated. Edema and/or Varicosities. 2+ to 3+ LLE edema. GASTROINTESTINAL. Abdomen:  Soft, without tenderness or masses. Bowel sounds present. Liver and Spleen:  No organomegaly or tenderness. LYMPATIC. Palpation of Lymph Nodes. Neck. No masses or tenderness noted. MUSCULOSKELETAL (FINDINGS CONSIDER AGE). Gait and Station:  Appropriate for age/history. Inspection and/or Palpation of Digits and Nails:  No clubbing, cyanosis, or inflammation. SKIN. Skin and Subcutaneous Tissue. No reported unexplained rashes, lesions, ulcers or bruising noted. Palpation of Skin and Subcutaneous Tissue. No indurations, nodules or tightening noted. NEUROLOGIC. MOTOR: Motor strength is symmetrical at 3-4/5 in the upper and lower extremities. Gait is unevaluated. The patient was able to sit for a long period of time. PSYCHIATRIC. Patient's judgment and insight. Normal for everyday activities, social activities and self awareness. Questions are answered appropriately. Intellectual function appears normal with no concretion or forced speech. Orientation to time, place and person. Oriented to time, place and person. Recent and remote memory. Normal ability to recall antecedent conversation; intact for both long and short term. Mood and affect. Appropriate affect; does not appear depressed, anxious or agitated at time of exam.        All labs last day  Admission on 01/09/2018   Component Date Value    WBC 01/09/2018 6.8     RBC 01/09/2018 4.90     HGB 01/09/2018 13.0     HCT 01/09/2018 39.9     MCV 01/09/2018 81.4     MCH 01/09/2018 26.5     MCHC 01/09/2018 32.6     RDW 01/09/2018 14.5     PLATELET 13/97/9080 94*    MPV 01/09/2018 9.7     NEUTROPHILS 01/09/2018 81*    LYMPHOCYTES 01/09/2018 11*    MONOCYTES 01/09/2018 8     EOSINOPHILS 01/09/2018 0     BASOPHILS 01/09/2018 0     ABS. NEUTROPHILS 01/09/2018 5.5     ABS. LYMPHOCYTES 01/09/2018 0.7*    ABS. MONOCYTES 01/09/2018 0.6     ABS. EOSINOPHILS 01/09/2018 0.0     ABS.  BASOPHILS 01/09/2018 0.0     DF 01/09/2018 AUTOMATED     Prothrombin time 01/09/2018 14.0     INR 01/09/2018 1.1     Sodium 01/09/2018 134*    Potassium 01/09/2018 3.9     Chloride 01/09/2018 97*    CO2 01/09/2018 34*    Anion gap 01/09/2018 3     Glucose 01/09/2018 118*    BUN 01/09/2018 11     Creatinine 01/09/2018 0.79     BUN/Creatinine ratio 01/09/2018 14     GFR est AA 01/09/2018 >60     GFR est non-AA 01/09/2018 >60     Calcium 01/09/2018 9.3     Ventricular Rate 01/09/2018 113     Atrial Rate 01/09/2018 113     P-R Interval 01/09/2018 140     QRS Duration 01/09/2018 96     Q-T Interval 01/09/2018 324     QTC Calculation (Bezet) 01/09/2018 444     Calculated P Axis 01/09/2018 80     Calculated R Axis 01/09/2018 -28     Calculated T Axis 01/09/2018 77     Diagnosis 01/09/2018                      Value:Sinus tachycardia  Biatrial enlargement  Anterior infarct , age undetermined  Vs lead placement error  Abnormal ECG  No previous ECGs available  Confirmed by Lorelei Martin MD, ----- ([id number]) on 1/9/2018 1:03:59 PM      CK 01/09/2018 105     CK - MB 01/09/2018 <1.0     CK-MB Index 01/09/2018 CALCULATION NOT PERFORMED WHEN RESULT IS BELOW LINEAR LIMIT     Troponin-I, Qt. 01/09/2018 <0.02     NT pro-BNP 01/09/2018 42        Imaging  Cta Chest W Or W Wo Cont    Addendum Date: 1/9/2018    Addendum: Called E.D. and informed Dr. Luna Burton with findings regarding PE and right lower lobe mass at 13:57. Result Date: 1/9/2018  IMPRESSION:      1. Bilateral pulmonary embolism. Greater burden on the left side. 2.  Right lower lobe anterior basal segment peripheral pleura-abutting mass. Recommend PET-CT for further delineation. 3.  Branching peripheral tubular density in the left lower lobe, probably a focus of mucus impaction. Ct Abd Pelv W Cont    Result Date: 1/9/2018  IMPRESSION: 1. Left femoral vein DVT extending to the common iliac vein. No DVT in the IVC - Right lower lobe mass. May correlate with hypercoagulable state. 2.  No acute findings along the gastrointestinal tract. 3.  Fibroid uterus. 4.  Left renal hypodensity, favor renal cortical cyst.    Xr Chest Port    Result Date: 1/9/2018  IMPRESSION: 1. No acute cardiopulmonary process. 2. Nonspecific nodular density projecting along the right lower hemithorax.  Further evaluation with CT would be beneficial.        ASSESSMENT & PLAN   Principal Problem:    Pulmonary emboli ([address] (0/0/2917) as a complication of Acute deep vein thrombosis (DVT) of left lower extremity [facility]): Specialty participation vascular surgery (notification per ER as discussed); anti coatulation with unfractionated heparin already started in ER; consider genetic testing of pro thrombotic proteins (heparin already stated)     Active Problems:    Right lower lobe lung mass (1/9/2018): Consider oncology v. CT surgery            Clarice Carrizales DO  1/9/2018  2:46 PM

## 2018-01-09 NOTE — IP AVS SNAPSHOT
[address] [place] [id number] 
[phone] Patient: Cecil Mabry MRN: [id number] :1945 A check evangelina indicates which time of day the medication should be taken. My Medications START taking these medications Instructions Each Dose to Equal  
 Morning Noon Evening Bedtime * apixaban 5 mg tablet Commonly known as:  Jose Pore Your last dose was: Your next dose is: Take 2 Tabs by mouth two (2) times a day. Indications: pulmonary thromboembolism 10 mg  
    
   
   
   
  
 * apixaban 5 mg tablet Commonly known as:  Jose Pore Start taking on:  2018 Your last dose was: Your next dose is: Take 1 Tab by mouth two (2) times a day. Indications: pulmonary thromboembolism 5 mg  
    
   
   
   
  
 oxyCODONE-acetaminophen 5-325 mg per tablet Commonly known as:  PERCOCET Your last dose was: Your next dose is: Take 1 Tab by mouth every four (4) hours as needed. Max Daily Amount: 6 Tabs. 1 Tab * Notice: This list has 2 medication(s) that are the same as other medications prescribed for you. Read the directions carefully, and ask your doctor or other care provider to review them with you. CONTINUE taking these medications Instructions Each Dose to Equal  
 Morning Noon Evening Bedtime  
 amLODIPine 5 mg tablet Commonly known as:  Senora Gus Your last dose was: Your next dose is: Take 5 mg by mouth daily. 5 mg VITAMIN D2 50,000 unit capsule Generic drug:  ergocalciferol Your last dose was: Your next dose is: Take 50,000 Units by mouth every seven (7) days. 86830 Units Where to Get Your Medications Information on where to get these meds will be given to you by the nurse or doctor. ! Ask your nurse or doctor about these medications  
  apixaban 5 mg tablet  
 apixaban 5 mg tablet  
 oxyCODONE-acetaminophen 5-325 mg per tablet

## 2018-01-10 LAB
ANION GAP SERPL CALC-SCNC: 5 MMOL/L (ref 3–18)
APTT PPP: 113.6 SEC (ref 23–36.4)
APTT PPP: 85.1 SEC (ref 23–36.4)
APTT PPP: 94.4 SEC (ref 23–36.4)
BUN SERPL-MCNC: 8 MG/DL (ref 7–18)
BUN/CREAT SERPL: 14 (ref 12–20)
CALCIUM SERPL-MCNC: 8.7 MG/DL (ref 8.5–10.1)
CHLORIDE SERPL-SCNC: 103 MMOL/L (ref 100–108)
CO2 SERPL-SCNC: 29 MMOL/L (ref 21–32)
CREAT SERPL-MCNC: 0.58 MG/DL (ref 0.6–1.3)
ERYTHROCYTE [DISTWIDTH] IN BLOOD BY AUTOMATED COUNT: 14.5 % (ref 11.6–14.5)
GLUCOSE SERPL-MCNC: 100 MG/DL (ref 74–99)
HCT VFR BLD AUTO: 35.3 % (ref 35–45)
HGB BLD-MCNC: 11.1 G/DL (ref 12–16)
MCH RBC QN AUTO: 25.7 PG (ref 24–34)
MCHC RBC AUTO-ENTMCNC: 31.4 G/DL (ref 31–37)
MCV RBC AUTO: 81.7 FL (ref 74–97)
PLATELET # BLD AUTO: 114 K/UL (ref 135–420)
PMV BLD AUTO: 10 FL (ref 9.2–11.8)
POTASSIUM SERPL-SCNC: 3.5 MMOL/L (ref 3.5–5.5)
RBC # BLD AUTO: 4.32 M/UL (ref 4.2–5.3)
SODIUM SERPL-SCNC: 137 MMOL/L (ref 136–145)
WBC # BLD AUTO: 5 K/UL (ref 4.6–13.2)

## 2018-01-10 PROCEDURE — 85027 COMPLETE CBC AUTOMATED: CPT | Performed by: INTERNAL MEDICINE

## 2018-01-10 PROCEDURE — 74011250637 HC RX REV CODE- 250/637: Performed by: INTERNAL MEDICINE

## 2018-01-10 PROCEDURE — 36415 COLL VENOUS BLD VENIPUNCTURE: CPT | Performed by: INTERNAL MEDICINE

## 2018-01-10 PROCEDURE — 85730 THROMBOPLASTIN TIME PARTIAL: CPT | Performed by: INTERNAL MEDICINE

## 2018-01-10 PROCEDURE — 80048 BASIC METABOLIC PNL TOTAL CA: CPT | Performed by: INTERNAL MEDICINE

## 2018-01-10 PROCEDURE — 74011250636 HC RX REV CODE- 250/636: Performed by: EMERGENCY MEDICINE

## 2018-01-10 PROCEDURE — 65660000000 HC RM CCU STEPDOWN

## 2018-01-10 RX ADMIN — HEPARIN SODIUM AND DEXTROSE 18 UNITS/KG/HR: 10000; 5 INJECTION INTRAVENOUS at 07:37

## 2018-01-10 RX ADMIN — Medication 10 ML: at 22:08

## 2018-01-10 RX ADMIN — Medication 10 ML: at 07:41

## 2018-01-10 RX ADMIN — ACETAMINOPHEN 650 MG: 325 TABLET ORAL at 23:43

## 2018-01-10 RX ADMIN — HEPARIN SODIUM AND DEXTROSE 18 UNITS/KG/HR: 10000; 5 INJECTION INTRAVENOUS at 12:49

## 2018-01-10 RX ADMIN — Medication 10 ML: at 14:00

## 2018-01-10 NOTE — PROGRESS NOTES
Care Management Interventions  PCP Verified by CM: Yes  Last Visit to PCP: 12/22/17  Current Support Network: Lives Alone, Family Lives Nearby  Confirm Follow Up Transport: Family  Plan discussed with Pt/Family/Caregiver: Yes     Pt is a 71year old female admitted for pulmonary embolism, DVT. Pt is alert and oriented in room. Pt states that she resides in the home alone and her plan is to return home at discharge. Pt's daughter Golden Hdez ([phone] or [phone]) lives nearby and is her POC. Pt indicates being totally independent with ADLs, IADLs and ambulation. Pt mentions that family will be assisting her with transportation at discharge.

## 2018-01-10 NOTE — ROUTINE PROCESS
Bedside shift change report given to Kris Ryan RN (oncoming nurse) by Domonique Honeycutt RN (offgoing nurse). Report included the following information SBAR, Kardex and Recent Results.

## 2018-01-10 NOTE — CONSULTS
Surgery Consult      Patient: Misa Perez MRN: [id number]  CSN: [id number]      YOB: 1945    Age: 71 y.o. Sex: female      DOA: 1/9/2018       HPI:     Misa Perez is a 71 y.o. female who presents with extensive left leg DVT and PE  She denies any recent travel or injury. No prolonged illness. She was confined to her home during the snow days but was not unusually sedentary  She even had very recent physical with her PCP and had a good check up! But she started noticing worsening edema prompting her to ED  She had remote history of a Right leg DVT, perhaps 20 years ago  She is now on heparin drip  She reports that some of the leg edema has decreased overnight    DVT is extensive, up to iliac level, and it is questioned if lysis therapy is indicated  She also has bilateral PE  She is on heparin gtt per protocol  Also of note is pulmonary mass which could correlate to a hypercoaguable state as cause of DVT      Past Medical History:   Diagnosis Date    Essential hypertension, benign     Nonspecific abnormal electrocardiogram (ECG) (EKG)     Undiagnosed cardiac murmurs     Unspecified congenital defect of septal closure        No past surgical history on file. No family history on file. Social History     Social History    Marital status:      Spouse name: N/A    Number of children: N/A    Years of education: N/A     Social History Main Topics    Smoking status: Never Smoker    Smokeless tobacco: Not on file    Alcohol use No    Drug use: Not on file    Sexual activity: Not on file     Other Topics Concern    Not on file     Social History Narrative    No narrative on file       Prior to Admission medications    Medication Sig Start Date End Date Taking? Authorizing Provider   amLODIPine (NORVASC) 5 mg tablet Take 5 mg by mouth daily. Yes Historical Provider   ergocalciferol (VITAMIN D2) 50,000 unit capsule Take 50,000 Units by mouth every seven (7) days.    Yes Historical Provider       No Known Allergies    Review of Systems  Review of Systems - History obtained from chart review and the patient  Constitutional: Negative for chills and fever. HENT: Negative for rhinorrhea. Respiratory: Negative for shortness of breath. Cardiovascular: Negative for chest pain. Gastrointestinal: Negative for abdominal pain, nausea and vomiting. Endocrine: Negative for polyuria. Genitourinary: Negative for dysuria. Musculoskeletal: Positive for myalgias  Skin: Negative for rash. Neurological: Negative for headaches  Vascular: left leg edema    Physical Exam:      Visit Vitals    /63 (BP 1 Location: Left arm, BP Patient Position: At rest)    Pulse 69    Temp 99.4 °F (37.4 °C)    Resp 20    Ht 5' 11\" (1.803 m)    Wt 136 lb 11.2 oz (62 kg)    SpO2 100%    BMI 19.07 kg/m2       Physical Exam:  A/O x 3, NAD  Notable Left leg edema including thigh and lower leg  Foot warm and well perfused, palpable pulses  No right leg edema     Data Review:    CBC:   Lab Results   Component Value Date/Time    WBC 5.0 01/10/2018 03:11 AM    RBC 4.32 01/10/2018 03:11 AM    HGB 11.1 01/10/2018 03:11 AM    HCT 35.3 01/10/2018 03:11 AM    PLATELET 844 41/83/9515 03:11 AM      BMP:   Lab Results   Component Value Date/Time    Glucose 100 01/10/2018 03:11 AM    Sodium 137 01/10/2018 03:11 AM    Potassium 3.5 01/10/2018 03:11 AM    Chloride 103 01/10/2018 03:11 AM    CO2 29 01/10/2018 03:11 AM    BUN 8 01/10/2018 03:11 AM    Creatinine 0.58 01/10/2018 03:11 AM    Calcium 8.7 01/10/2018 03:11 AM     Coagulation:   Lab Results   Component Value Date/Time    Prothrombin time 14.0 01/09/2018 10:47 AM    INR 1.1 01/09/2018 10:47 AM    aPTT 113.6 01/10/2018 03:11 AM     Left leg :  1. Extensive acute occlusive deep venous thrombosis identified in the  common femoral, femoral,  popliteal, soleal, gastrocnemius and deep  femoral veins.   2. Deep veins visualized include the common femoral, femoral, deep  femoral, popliteal, gastrocnemius, soleal, posterior tibial and  peroneal veins. 3. Acute occlusive superficial venous thrombosis identified in the  great saphenous at the sapheno-femoral junction. 4. Superficial veins visualized include the proximal great saphenous  vein. 5. The left posterior tibial and anterior tibial arteries are patent  with multiphasic flow. 6. No evidence of deep vein thrombosis in the contralateral common  femoral vein. Chest CT  1. Bilateral pulmonary embolism. Greater burden on the left side.     2. Right lower lobe anterior basal segment peripheral pleura-abutting mass. Recommend PET-CT for further delineation.     3. Branching peripheral tubular density in the left lower lobe, probably a  focus of mucus impaction. CT abdomen    1. Left femoral vein DVT extending to the common iliac vein. No DVT in the IVC      - Right lower lobe mass. May correlate with hypercoagulable state.     2. No acute findings along the gastrointestinal tract.      3. Fibroid uterus.     4.  Left renal hypodensity, favor renal cortical cyst    Assessment/Plan     Seemingly unprovoked but extensive DVT  Discussed consideration of lysis therapy  Details for that procedure explained in detail, including continued need for anticoagulation afterwards  She is thinking about it and wants to discuss with daughter  She is agreeable to tentatively schedule procedure with dr Ronald Capps tomorrow  Pulmonary eval for lung mass?     Principal Problem:    Pulmonary emboli ([address] (1/9/2018)    Active Problems:    Right lower lobe lung mass (1/9/2018)      Acute deep vein thrombosis (DVT) of left lower extremity ([address] (1/9/2018)        JANEEN Sharma  January 10, 2018

## 2018-01-10 NOTE — PROGRESS NOTES
[facility]  Progress Note    Patient: Alon Cuellar Age: 71 y.o. : 1945 MR#: [id number] SSN: [id number]  Date/Time: 1/10/2018     Subjective: pt feels fine, no SOB or CP. C/o L leg pain  Admits 10 lbs wt loss, non smoker      Assessment/Plan:   1. Acute PE and DVT LLE: cont IV heparin. Will get pul eval and echo. Hold PO agents for now  2. Acute occlusive DVT LLE: Vas Sx on case, plan for lysis noted   3. R lung mass: ? Cancer d/w pul team.   4. HTN: BP stable, will monitor   5. Thrombocytopenia: will monitor, no signs of bleeding   D/w pt and niece at bedside   Called and left message to daughter 0-65. I spent 30 minutes with the patient in face-to-face consultation, of which greater than 50% was spent in counseling and coordination of care as described above. Case discussed with:  [x]Patient  [x]Family  [x]Nursing  []Case Management  DVT Prophylaxis:  []Lovenox  []Hep SQ  []SCDs  []Coumadin   [x]On Heparin gtt    Objective:   VS:   Visit Vitals    /71 (BP 1 Location: Right arm, BP Patient Position: At rest)    Pulse 79    Temp 99.7 °F (37.6 °C)    Resp 20    Ht 5' 11\" (1.803 m)    Wt 62 kg (136 lb 11.2 oz)    SpO2 97%    BMI 19.07 kg/m2      Tmax/24hrs: Temp (24hrs), Av.2 °F (37.3 °C), Min:97.9 °F (36.6 °C), Max:99.7 °F (37.6 °C)  IOBRIEF  Intake/Output Summary (Last 24 hours) at 01/10/18 1647  Last data filed at 01/10/18 1154   Gross per 24 hour   Intake              360 ml   Output                0 ml   Net              360 ml       General:  Alert, cooperative, no acute distress    Pulmonary:  CTA Bilaterally. No Wheezing/Rhonchi/Rales. Cardiovascular: Regular rate and Rhythm. GI:  Soft, Non distended, Non tender. + Bowel sounds. Extremities:  No edema, cyanosis, clubbing. L leg and calf swelling and tenderness. Psych: Good insight. Not anxious or agitated. Neurologic: Alert and oriented X 3.  No acute neuro deficits. Medications:   Current Facility-Administered Medications   Medication Dose Route Frequency    heparin 25,000 units in D5W 250 ml infusion  18-36 Units/kg/hr IntraVENous TITRATE    sodium chloride (NS) flush 5-10 mL  5-10 mL IntraVENous Q8H    sodium chloride (NS) flush 5-10 mL  5-10 mL IntraVENous PRN    acetaminophen (TYLENOL) tablet 650 mg  650 mg Oral Q4H PRN    HYDROcodone-acetaminophen (NORCO) 7.5-325 mg per tablet 1 Tab  1 Tab Oral Q4H PRN    morphine injection 2 mg  2 mg IntraVENous Q3H PRN    diphenhydrAMINE (BENADRYL) capsule 25 mg  25 mg Oral Q4H PRN    ondansetron (ZOFRAN ODT) tablet 4 mg  4 mg Oral Q4H PRN    docusate sodium (COLACE) capsule 100 mg  100 mg Oral BID       Labs:    Recent Results (from the past 24 hour(s))   CBC WITH AUTOMATED DIFF    Collection Time: 01/09/18  7:23 PM   Result Value Ref Range    WBC 7.0 4.6 - 13.2 K/uL    RBC 4.50 4. 20 - 5.30 M/uL    HGB 12.0 12.0 - 16.0 g/dL    HCT 36.9 35.0 - 45.0 %    MCV 82.0 74.0 - 97.0 FL    MCH 26.7 24.0 - 34.0 PG    MCHC 32.5 31.0 - 37.0 g/dL    RDW 14.4 11.6 - 14.5 %    PLATELET 97 (L) 446 - 420 K/uL    MPV 9.9 9.2 - 11.8 FL    NEUTROPHILS 65 40 - 73 %    LYMPHOCYTES 25 21 - 52 %    MONOCYTES 9 3 - 10 %    EOSINOPHILS 1 0 - 5 %    BASOPHILS 0 0 - 2 %    ABS. NEUTROPHILS 4.6 1.8 - 8.0 K/UL    ABS. LYMPHOCYTES 1.7 0.9 - 3.6 K/UL    ABS. MONOCYTES 0.6 0.05 - 1.2 K/UL    ABS. EOSINOPHILS 0.1 0.0 - 0.4 K/UL    ABS.  BASOPHILS 0.0 0.0 - 0.06 K/UL    DF AUTOMATED     PTT    Collection Time: 01/09/18  7:23 PM   Result Value Ref Range    aPTT 140.9 (H) 23.0 - 36.4 SEC   PTT    Collection Time: 01/10/18  3:11 AM   Result Value Ref Range    aPTT 113.6 (H) 23.0 - 07.4 SEC   METABOLIC PANEL, BASIC    Collection Time: 01/10/18  3:11 AM   Result Value Ref Range    Sodium 137 136 - 145 mmol/L    Potassium 3.5 3.5 - 5.5 mmol/L    Chloride 103 100 - 108 mmol/L    CO2 29 21 - 32 mmol/L    Anion gap 5 3.0 - 18 mmol/L    Glucose 100 (H) 74 - 99 mg/dL    BUN 8 7.0 - 18 MG/DL    Creatinine 0.58 (L) 0.6 - 1.3 MG/DL    BUN/Creatinine ratio 14 12 - 20      GFR est AA >60 >60 ml/min/1.73m2    GFR est non-AA >60 >60 ml/min/1.73m2    Calcium 8.7 8.5 - 10.1 MG/DL   CBC W/O DIFF    Collection Time: 01/10/18  3:11 AM   Result Value Ref Range    WBC 5.0 4.6 - 13.2 K/uL    RBC 4.32 4.20 - 5.30 M/uL    HGB 11.1 (L) 12.0 - 16.0 g/dL    HCT 35.3 35.0 - 45.0 %    MCV 81.7 74.0 - 97.0 FL    MCH 25.7 24.0 - 34.0 PG    MCHC 31.4 31.0 - 37.0 g/dL    RDW 14.5 11.6 - 14.5 %    PLATELET 974 (L) 495 - 420 K/uL    MPV 10.0 9.2 - 11.8 FL   PTT    Collection Time: 01/10/18 11:16 AM   Result Value Ref Range    aPTT 94.4 (H) 23.0 - 36.4 SEC   PTT    Collection Time: 01/10/18  1:27 PM   Result Value Ref Range    aPTT 85.1 (H) 23.0 - 36.4 SEC       Signed By: Bart Neumann MD     January 10, 2018

## 2018-01-11 ENCOUNTER — APPOINTMENT (OUTPATIENT)
Dept: CARDIAC CATH/INVASIVE PROCEDURES | Age: 73
DRG: 270 | End: 2018-01-11
Payer: COMMERCIAL

## 2018-01-11 LAB
ANION GAP SERPL CALC-SCNC: 7 MMOL/L (ref 3–18)
APPEARANCE UR: ABNORMAL
APTT PPP: 86.6 SEC (ref 23–36.4)
BASOPHILS # BLD: 0 K/UL (ref 0–0.1)
BASOPHILS NFR BLD: 0 % (ref 0–2)
BILIRUB UR QL: NEGATIVE
BUN SERPL-MCNC: 6 MG/DL (ref 7–18)
BUN/CREAT SERPL: 11 (ref 12–20)
CALCIUM SERPL-MCNC: 8.9 MG/DL (ref 8.5–10.1)
CHLORIDE SERPL-SCNC: 103 MMOL/L (ref 100–108)
CO2 SERPL-SCNC: 28 MMOL/L (ref 21–32)
COLOR UR: YELLOW
CREAT SERPL-MCNC: 0.53 MG/DL (ref 0.6–1.3)
DIFFERENTIAL METHOD BLD: ABNORMAL
EOSINOPHIL # BLD: 0.1 K/UL (ref 0–0.4)
EOSINOPHIL NFR BLD: 1 % (ref 0–5)
ERYTHROCYTE [DISTWIDTH] IN BLOOD BY AUTOMATED COUNT: 14.4 % (ref 11.6–14.5)
GLUCOSE SERPL-MCNC: 91 MG/DL (ref 74–99)
GLUCOSE UR STRIP.AUTO-MCNC: NEGATIVE MG/DL
HCT VFR BLD AUTO: 34.5 % (ref 35–45)
HGB BLD-MCNC: 10.9 G/DL (ref 12–16)
HGB UR QL STRIP: NEGATIVE
KETONES UR QL STRIP.AUTO: 15 MG/DL
LEUKOCYTE ESTERASE UR QL STRIP.AUTO: NEGATIVE
LYMPHOCYTES # BLD: 1.6 K/UL (ref 0.9–3.6)
LYMPHOCYTES NFR BLD: 27 % (ref 21–52)
MCH RBC QN AUTO: 25.8 PG (ref 24–34)
MCHC RBC AUTO-ENTMCNC: 31.6 G/DL (ref 31–37)
MCV RBC AUTO: 81.6 FL (ref 74–97)
MONOCYTES # BLD: 0.6 K/UL (ref 0.05–1.2)
MONOCYTES NFR BLD: 9 % (ref 3–10)
NEUTS SEG # BLD: 3.8 K/UL (ref 1.8–8)
NEUTS SEG NFR BLD: 63 % (ref 40–73)
NITRITE UR QL STRIP.AUTO: NEGATIVE
PH UR STRIP: 8 [PH] (ref 5–8)
PLATELET # BLD AUTO: 160 K/UL (ref 135–420)
PMV BLD AUTO: 10.3 FL (ref 9.2–11.8)
POTASSIUM SERPL-SCNC: 3.7 MMOL/L (ref 3.5–5.5)
PROT UR STRIP-MCNC: NEGATIVE MG/DL
RBC # BLD AUTO: 4.23 M/UL (ref 4.2–5.3)
SODIUM SERPL-SCNC: 138 MMOL/L (ref 136–145)
SP GR UR REFRACTOMETRY: 1.01 (ref 1–1.03)
UROBILINOGEN UR QL STRIP.AUTO: 1 EU/DL (ref 0.2–1)
WBC # BLD AUTO: 6.1 K/UL (ref 4.6–13.2)

## 2018-01-11 PROCEDURE — 83090 ASSAY OF HOMOCYSTEINE: CPT | Performed by: INTERNAL MEDICINE

## 2018-01-11 PROCEDURE — C1725 CATH, TRANSLUMIN NON-LASER: HCPCS

## 2018-01-11 PROCEDURE — 80048 BASIC METABOLIC PNL TOTAL CA: CPT | Performed by: HOSPITALIST

## 2018-01-11 PROCEDURE — C1753 CATH, INTRAVAS ULTRASOUND: HCPCS

## 2018-01-11 PROCEDURE — 37221 HC PLC STNT ILIAC +/- PTA INIT: CPT

## 2018-01-11 PROCEDURE — C1894 INTRO/SHEATH, NON-LASER: HCPCS

## 2018-01-11 PROCEDURE — 74011636320 HC RX REV CODE- 636/320: Performed by: SURGERY

## 2018-01-11 PROCEDURE — 74011250636 HC RX REV CODE- 250/636

## 2018-01-11 PROCEDURE — 85730 THROMBOPLASTIN TIME PARTIAL: CPT | Performed by: HOSPITALIST

## 2018-01-11 PROCEDURE — B549ZZ3 ULTRASONOGRAPHY OF INFERIOR VENA CAVA, INTRAVASCULAR: ICD-10-PCS | Performed by: SURGERY

## 2018-01-11 PROCEDURE — 067D3DZ DILATION OF LEFT COMMON ILIAC VEIN WITH INTRALUMINAL DEVICE, PERCUTANEOUS APPROACH: ICD-10-PCS | Performed by: SURGERY

## 2018-01-11 PROCEDURE — 65620000000 HC RM CCU GENERAL

## 2018-01-11 PROCEDURE — 74011000258 HC RX REV CODE- 258

## 2018-01-11 PROCEDURE — 3E03317 INTRODUCTION OF OTHER THROMBOLYTIC INTO PERIPHERAL VEIN, PERCUTANEOUS APPROACH: ICD-10-PCS | Performed by: SURGERY

## 2018-01-11 PROCEDURE — 06CD3ZZ EXTIRPATION OF MATTER FROM LEFT COMMON ILIAC VEIN, PERCUTANEOUS APPROACH: ICD-10-PCS | Performed by: SURGERY

## 2018-01-11 PROCEDURE — 37253 INTRVASC US NONCORONARY ADDL: CPT

## 2018-01-11 PROCEDURE — B5191ZZ FLUOROSCOPY OF INFERIOR VENA CAVA USING LOW OSMOLAR CONTRAST: ICD-10-PCS | Performed by: SURGERY

## 2018-01-11 PROCEDURE — 77030004565 HC CATH ANGI DX TMPO CARD -B

## 2018-01-11 PROCEDURE — 06CN3ZZ EXTIRPATION OF MATTER FROM LEFT FEMORAL VEIN, PERCUTANEOUS APPROACH: ICD-10-PCS | Performed by: SURGERY

## 2018-01-11 PROCEDURE — 85613 RUSSELL VIPER VENOM DILUTED: CPT | Performed by: INTERNAL MEDICINE

## 2018-01-11 PROCEDURE — C1769 GUIDE WIRE: HCPCS

## 2018-01-11 PROCEDURE — 81003 URINALYSIS AUTO W/O SCOPE: CPT | Performed by: INTERNAL MEDICINE

## 2018-01-11 PROCEDURE — 36415 COLL VENOUS BLD VENIPUNCTURE: CPT | Performed by: HOSPITALIST

## 2018-01-11 PROCEDURE — 06CG3ZZ EXTIRPATION OF MATTER FROM LEFT EXTERNAL ILIAC VEIN, PERCUTANEOUS APPROACH: ICD-10-PCS | Performed by: SURGERY

## 2018-01-11 PROCEDURE — C1876 STENT, NON-COA/NON-COV W/DEL: HCPCS

## 2018-01-11 PROCEDURE — 99152 MOD SED SAME PHYS/QHP 5/>YRS: CPT

## 2018-01-11 PROCEDURE — 06CY3ZZ EXTIRPATION OF MATTER FROM LOWER VEIN, PERCUTANEOUS APPROACH: ICD-10-PCS | Performed by: SURGERY

## 2018-01-11 PROCEDURE — 74011250637 HC RX REV CODE- 250/637: Performed by: HOSPITALIST

## 2018-01-11 PROCEDURE — 067C3DZ DILATION OF RIGHT COMMON ILIAC VEIN WITH INTRALUMINAL DEVICE, PERCUTANEOUS APPROACH: ICD-10-PCS | Performed by: SURGERY

## 2018-01-11 PROCEDURE — B51D1ZZ FLUOROSCOPY OF BILATERAL LOWER EXTREMITY VEINS USING LOW OSMOLAR CONTRAST: ICD-10-PCS | Performed by: SURGERY

## 2018-01-11 PROCEDURE — 81240 F2 GENE: CPT | Performed by: INTERNAL MEDICINE

## 2018-01-11 PROCEDURE — B54DZZ3 ULTRASONOGRAPHY OF BILATERAL LOWER EXTREMITY VEINS, INTRAVASCULAR: ICD-10-PCS | Performed by: SURGERY

## 2018-01-11 PROCEDURE — 37252 INTRVASC US NONCORONARY 1ST: CPT

## 2018-01-11 PROCEDURE — 76937 US GUIDE VASCULAR ACCESS: CPT

## 2018-01-11 PROCEDURE — 86148 ANTI-PHOSPHOLIPID ANTIBODY: CPT | Performed by: INTERNAL MEDICINE

## 2018-01-11 PROCEDURE — 067N3ZZ DILATION OF LEFT FEMORAL VEIN, PERCUTANEOUS APPROACH: ICD-10-PCS | Performed by: SURGERY

## 2018-01-11 PROCEDURE — 74011250636 HC RX REV CODE- 250/636: Performed by: EMERGENCY MEDICINE

## 2018-01-11 PROCEDURE — 99153 MOD SED SAME PHYS/QHP EA: CPT

## 2018-01-11 PROCEDURE — 81241 F5 GENE: CPT | Performed by: INTERNAL MEDICINE

## 2018-01-11 PROCEDURE — C1757 CATH, THROMBECTOMY/EMBOLECT: HCPCS

## 2018-01-11 PROCEDURE — 74011000250 HC RX REV CODE- 250: Performed by: SURGERY

## 2018-01-11 PROCEDURE — 74011250636 HC RX REV CODE- 250/636: Performed by: SURGERY

## 2018-01-11 PROCEDURE — 85025 COMPLETE CBC W/AUTO DIFF WBC: CPT | Performed by: HOSPITALIST

## 2018-01-11 PROCEDURE — 37187 VENOUS MECH THROMBECTOMY: CPT

## 2018-01-11 RX ORDER — HEPARIN SODIUM 200 [USP'U]/100ML
1000 INJECTION, SOLUTION INTRAVENOUS ONCE
Status: COMPLETED | OUTPATIENT
Start: 2018-01-11 | End: 2018-01-11

## 2018-01-11 RX ORDER — ACETAMINOPHEN 325 MG/1
650 TABLET ORAL
Status: DISCONTINUED | OUTPATIENT
Start: 2018-01-11 | End: 2018-01-12 | Stop reason: HOSPADM

## 2018-01-11 RX ORDER — HEPARIN SODIUM 1000 [USP'U]/ML
1000-10000 INJECTION, SOLUTION INTRAVENOUS; SUBCUTANEOUS
Status: DISCONTINUED | OUTPATIENT
Start: 2018-01-11 | End: 2018-01-11 | Stop reason: HOSPADM

## 2018-01-11 RX ORDER — LIDOCAINE HYDROCHLORIDE 10 MG/ML
1-30 INJECTION, SOLUTION EPIDURAL; INFILTRATION; INTRACAUDAL; PERINEURAL
Status: DISCONTINUED | OUTPATIENT
Start: 2018-01-11 | End: 2018-01-11 | Stop reason: HOSPADM

## 2018-01-11 RX ORDER — AMLODIPINE BESYLATE 5 MG/1
5 TABLET ORAL DAILY
Status: DISCONTINUED | OUTPATIENT
Start: 2018-01-12 | End: 2018-01-12 | Stop reason: HOSPADM

## 2018-01-11 RX ORDER — MORPHINE SULFATE 10 MG/ML
1 INJECTION, SOLUTION INTRAMUSCULAR; INTRAVENOUS
Status: DISCONTINUED | OUTPATIENT
Start: 2018-01-11 | End: 2018-01-12 | Stop reason: HOSPADM

## 2018-01-11 RX ORDER — AMLODIPINE BESYLATE 10 MG/1
10 TABLET ORAL
Status: COMPLETED | OUTPATIENT
Start: 2018-01-11 | End: 2018-01-11

## 2018-01-11 RX ORDER — OXYCODONE AND ACETAMINOPHEN 5; 325 MG/1; MG/1
1 TABLET ORAL
Status: DISCONTINUED | OUTPATIENT
Start: 2018-01-11 | End: 2018-01-12 | Stop reason: HOSPADM

## 2018-01-11 RX ORDER — DIPHENHYDRAMINE HYDROCHLORIDE 50 MG/ML
12.5 INJECTION, SOLUTION INTRAMUSCULAR; INTRAVENOUS
Status: DISCONTINUED | OUTPATIENT
Start: 2018-01-11 | End: 2018-01-12 | Stop reason: HOSPADM

## 2018-01-11 RX ORDER — ONDANSETRON 2 MG/ML
4 INJECTION INTRAMUSCULAR; INTRAVENOUS
Status: DISCONTINUED | OUTPATIENT
Start: 2018-01-11 | End: 2018-01-12 | Stop reason: HOSPADM

## 2018-01-11 RX ORDER — MIDAZOLAM HYDROCHLORIDE 1 MG/ML
1-4 INJECTION, SOLUTION INTRAMUSCULAR; INTRAVENOUS
Status: DISCONTINUED | OUTPATIENT
Start: 2018-01-11 | End: 2018-01-11 | Stop reason: HOSPADM

## 2018-01-11 RX ORDER — IODIXANOL 320 MG/ML
1-150 INJECTION, SOLUTION INTRAVASCULAR
Status: DISCONTINUED | OUTPATIENT
Start: 2018-01-11 | End: 2018-01-11 | Stop reason: HOSPADM

## 2018-01-11 RX ORDER — FENTANYL CITRATE 50 UG/ML
25-100 INJECTION, SOLUTION INTRAMUSCULAR; INTRAVENOUS
Status: DISCONTINUED | OUTPATIENT
Start: 2018-01-11 | End: 2018-01-11 | Stop reason: HOSPADM

## 2018-01-11 RX ADMIN — FENTANYL CITRATE 25 MCG: 50 INJECTION INTRAMUSCULAR; INTRAVENOUS at 17:11

## 2018-01-11 RX ADMIN — FENTANYL CITRATE 25 MCG: 50 INJECTION INTRAMUSCULAR; INTRAVENOUS at 18:29

## 2018-01-11 RX ADMIN — Medication 10 ML: at 07:08

## 2018-01-11 RX ADMIN — MIDAZOLAM HYDROCHLORIDE 1 MG: 1 INJECTION, SOLUTION INTRAMUSCULAR; INTRAVENOUS at 17:36

## 2018-01-11 RX ADMIN — IODIXANOL 12 ML: 320 INJECTION, SOLUTION INTRAVASCULAR at 18:48

## 2018-01-11 RX ADMIN — HEPARIN SODIUM AND DEXTROSE 18 UNITS/KG/HR: 10000; 5 INJECTION INTRAVENOUS at 14:26

## 2018-01-11 RX ADMIN — MIDAZOLAM HYDROCHLORIDE 1 MG: 1 INJECTION, SOLUTION INTRAMUSCULAR; INTRAVENOUS at 17:11

## 2018-01-11 RX ADMIN — HEPARIN SODIUM 1000 UNITS: 200 INJECTION, SOLUTION INTRAVENOUS at 17:18

## 2018-01-11 RX ADMIN — FENTANYL CITRATE 25 MCG: 50 INJECTION INTRAMUSCULAR; INTRAVENOUS at 18:09

## 2018-01-11 RX ADMIN — FENTANYL CITRATE 25 MCG: 50 INJECTION INTRAMUSCULAR; INTRAVENOUS at 17:36

## 2018-01-11 RX ADMIN — Medication 10 ML: at 20:28

## 2018-01-11 RX ADMIN — HEPARIN SODIUM AND DEXTROSE 18 UNITS/KG/HR: 10000; 5 INJECTION INTRAVENOUS at 07:37

## 2018-01-11 RX ADMIN — IOPAMIDOL 9 ML: 612 INJECTION, SOLUTION INTRAVENOUS at 18:47

## 2018-01-11 RX ADMIN — LIDOCAINE HYDROCHLORIDE 16 ML: 10 INJECTION, SOLUTION EPIDURAL; INFILTRATION; INTRACAUDAL; PERINEURAL at 17:17

## 2018-01-11 RX ADMIN — AMLODIPINE BESYLATE 10 MG: 10 TABLET ORAL at 20:28

## 2018-01-11 RX ADMIN — HEPARIN SODIUM 5000 UNITS: 1000 INJECTION, SOLUTION INTRAVENOUS; SUBCUTANEOUS at 18:29

## 2018-01-11 NOTE — PROGRESS NOTES
NUTRITION    Nutrition Screen      RECOMMENDATIONS / PLAN:     - Start diet as medically appropriate. - Add supplements: Ensure Enlive TID, vanilla once diet is started. - Continue RD inpatient monitoring and evaluation. NUTRITION INTERVENTIONS & DIAGNOSIS:     [x] Meals/snacks: modified composition  [x] Medical food supplement therapy: initiate     Nutrition Diagnosis: Unintentional weight loss related to inadequate energy intake as evidenced by 10-15 lb weight loss in the past year. ASSESSMENT:     Pt NPO for procedure. Eating well PTA, reports weight loss over the past year. Average po intake adequate to meet patients estimated nutritional needs:   [] Yes     [x] No   [] Unable to determine at this time    Diet: DIET NPO Except Meds      Food Allergies: NKFA  Current Appetite:   [x] Good     [] Fair     [] Poor     [] Other:  Appetite/meal intake prior to admission:   [x] Good     [] Fair     [] Poor     [] Other:  Feeding Limitations:  [] Swallowing difficulty    [] Chewing difficulty    [] Other:  Current Meal Intake: Patient Vitals for the past 100 hrs:   % Diet Eaten   01/10/18 1154 50 %     BM: 1/10   Skin Integrity: WDL  Edema: 2+ LLE  Pertinent Medications: Reviewed    Recent Labs      01/11/18   0235  01/10/18   0311  01/09/18   1047   NA  138  137  134*   K  3.7  3.5  3.9   CL  103  103  97*   CO2  28  29  34*   GLU  91  100*  118*   BUN  6*  8  11   CREA  0.53*  0.58*  0.79   CA  8.9  8.7  9.3       Intake/Output Summary (Last 24 hours) at 01/11/18 1429  Last data filed at 01/11/18 1150   Gross per 24 hour   Intake              132 ml   Output              600 ml   Net             -468 ml       Anthropometrics:  Ht Readings from Last 1 Encounters:   01/09/18 5' 11\" (1.803 m)     Last 3 Recorded Weights in this Encounter    01/09/18 1034 01/10/18 0400 01/11/18 0416   Weight: 61.2 kg (135 lb) 62 kg (136 lb 11.2 oz) 58.6 kg (129 lb 1.6 oz)     Body mass index is 18.01 kg/(m^2). Underweight     Weight History: 10-15 lb weight loss over the past year, unintentional per pt    Weight Metrics 1/11/2018 2/24/2015   Weight 129 lb 1.6 oz 170 lb   BMI 18.01 kg/m2 22.43 kg/m2        Admitting Diagnosis: Pulmonary embolism (HCC)  DVT (deep venous thrombosis) (HCC)  Pulmonary emboli (HCC)  Pertinent PMHx: HTN    Education Needs:        [x] None identified  [] Identified - Not appropriate at this time  []  Identified and addressed - refer to education log  Learning Limitations:   [x] None identified  [] Identified    Cultural, [employer]alism & ethnic food preferences:  [x] None identified    [] Identified and addressed     ESTIMATED NUTRITION NEEDS:     Calories: 4621-4514 kcal (MSJx1.2-1.3) based on  [x] Actual BW 59 kg     [] IBW   Protein: 47-59 gm (0.8-1 gm/kg) based on  [x] Actual BW      [] IBW   Fluid: 1 mL/kcal     MONITORING & EVALUATION:     Nutrition Goal(s):   1. Po intake of meals will meet >75% of patient estimated nutritional needs within the next 7 days.   Outcome:  [] Met/Ongoing    []  Not Met    [x] New/Initial Goal     Monitoring:   [x] Food and beverage intake   [x] Diet order   [x] Nutrition-focused physical findings   [x] Treatment/therapy   [] Weight   [] Enteral nutrition intake        Previous Recommendations (for follow-up assessments only):     []   Implemented       []   Not Implemented (RD to address)      [] No Longer Appropriate     [] No Recommendation Made     Discharge Planning: cardiac diet   [x] Participated in care planning, discharge planning, & interdisciplinary rounds as appropriate      Vicky Woodard, [address], [address] [place] [address]    Pager: [phone]

## 2018-01-11 NOTE — PROGRESS NOTES
[facility]  Progress Note    Patient: Betty Grayson Age: 71 y.o. : 1945 MR#: [id number] SSN: [id number]  Date/Time: 2018     Subjective: pt feels fine, no SOB or CP. C/o L leg pain, lot better     Assessment/Plan:   1. Acute PE and DVT LLE: cont IV heparin. pul eval and echo noted. Hold PO agents for now  2. Acute recurrent occlusive DVT LLE: Vas Sx on case, plan for lysis today, d/w Dr. Neri Jordan   3. R lung mass: d/w pul team. Looks like infract, recommend out pt follow up with PET scan    4. HTN: BP stable, will monitor   5. Thrombocytopenia: better    D/w pt and niece at bedside   D/w daughter Mague Wilkerson [phone]. D/w pt and daughter about risk and benefits of anticoagulation including but not limited to risk of bleeding with falls and spontaneous GI bleed with anticoagulation. Pt's  understands and wants to proceed with anticoagulation       Case discussed with:  [x]Patient  [x]Family  [x]Nursing  []Case Management  DVT Prophylaxis:  []Lovenox  []Hep SQ  []SCDs  []Coumadin   [x]On Heparin gtt    Objective:   VS:   Visit Vitals    /72 (BP 1 Location: Right arm, BP Patient Position: At rest)    Pulse 79    Temp 99.6 °F (37.6 °C)    Resp 18    Ht 5' 11\" (1.803 m)    Wt 58.6 kg (129 lb 1.6 oz)    SpO2 99%    BMI 18.01 kg/m2      Tmax/24hrs: Temp (24hrs), Av.4 °F (37.4 °C), Min:98.6 °F (37 °C), Max:100.2 °F (37.9 °C)  IOBRIEF    Intake/Output Summary (Last 24 hours) at 18 1421  Last data filed at 18 1150   Gross per 24 hour   Intake              132 ml   Output              600 ml   Net             -468 ml       General:  Alert, cooperative, no acute distress    Pulmonary:  CTA Bilaterally. No Wheezing/Rhonchi/Rales. Cardiovascular: Regular rate and Rhythm. GI:  Soft, Non distended, Non tender. + Bowel sounds. Extremities:  No edema, cyanosis, clubbing. L leg and calf swelling and tenderness.    Psych: Good insight. Not anxious or agitated. Neurologic: Alert and oriented X 3. No acute neuro deficits. Medications:   Current Facility-Administered Medications   Medication Dose Route Frequency    heparin 25,000 units in D5W 250 ml infusion  18-36 Units/kg/hr IntraVENous TITRATE    sodium chloride (NS) flush 5-10 mL  5-10 mL IntraVENous Q8H    sodium chloride (NS) flush 5-10 mL  5-10 mL IntraVENous PRN    acetaminophen (TYLENOL) tablet 650 mg  650 mg Oral Q4H PRN    HYDROcodone-acetaminophen (NORCO) 7.5-325 mg per tablet 1 Tab  1 Tab Oral Q4H PRN    morphine injection 2 mg  2 mg IntraVENous Q3H PRN    diphenhydrAMINE (BENADRYL) capsule 25 mg  25 mg Oral Q4H PRN    ondansetron (ZOFRAN ODT) tablet 4 mg  4 mg Oral Q4H PRN    docusate sodium (COLACE) capsule 100 mg  100 mg Oral BID       Labs:    Recent Results (from the past 24 hour(s))   PTT    Collection Time: 01/11/18  2:35 AM   Result Value Ref Range    aPTT 86.6 (H) 23.0 - 94.7 SEC   METABOLIC PANEL, BASIC    Collection Time: 01/11/18  2:35 AM   Result Value Ref Range    Sodium 138 136 - 145 mmol/L    Potassium 3.7 3.5 - 5.5 mmol/L    Chloride 103 100 - 108 mmol/L    CO2 28 21 - 32 mmol/L    Anion gap 7 3.0 - 18 mmol/L    Glucose 91 74 - 99 mg/dL    BUN 6 (L) 7.0 - 18 MG/DL    Creatinine 0.53 (L) 0.6 - 1.3 MG/DL    BUN/Creatinine ratio 11 (L) 12 - 20      GFR est AA >60 >60 ml/min/1.73m2    GFR est non-AA >60 >60 ml/min/1.73m2    Calcium 8.9 8.5 - 10.1 MG/DL   CBC WITH AUTOMATED DIFF    Collection Time: 01/11/18  2:35 AM   Result Value Ref Range    WBC 6.1 4.6 - 13.2 K/uL    RBC 4.23 4.20 - 5.30 M/uL    HGB 10.9 (L) 12.0 - 16.0 g/dL    HCT 34.5 (L) 35.0 - 45.0 %    MCV 81.6 74.0 - 97.0 FL    MCH 25.8 24.0 - 34.0 PG    MCHC 31.6 31.0 - 37.0 g/dL    RDW 14.4 11.6 - 14.5 %    PLATELET 925 699 - 784 K/uL    MPV 10.3 9.2 - 11.8 FL    NEUTROPHILS 63 40 - 73 %    LYMPHOCYTES 27 21 - 52 %    MONOCYTES 9 3 - 10 %    EOSINOPHILS 1 0 - 5 %    BASOPHILS 0 0 - 2 %    ABS.  NEUTROPHILS 3. 8 1.8 - 8.0 K/UL    ABS. LYMPHOCYTES 1.6 0.9 - 3.6 K/UL    ABS. MONOCYTES 0.6 0.05 - 1.2 K/UL    ABS. EOSINOPHILS 0.1 0.0 - 0.4 K/UL    ABS.  BASOPHILS 0.0 0.0 - 0.1 K/UL    DF AUTOMATED         Signed By: Mulugeta Pena MD     January 11, 2018

## 2018-01-11 NOTE — INTERVAL H&P NOTE
H&P Update:  Kenneth Galeas was seen and examined. History and physical has been reviewed. The patient has been examined.  There have been no significant clinical changes since the completion of the originally dated History and Physical.    Signed By: Anali Lund MD     January 11, 2018 4:52 PM

## 2018-01-11 NOTE — PROGRESS NOTES
Extensive DVT identified on left side. None on right side. Offered bilateral procedure prior to coming back given history of dvt bilaterally. And some more mild symptoms of CVI on right leg with varicose veins. On table patient asked for bilateral procedure so performed. Right side with lots of chronic thrombus noted within pop, femoral, and CFV with large vascular web within right civ. Stented right civ. Left side with extensive dvt from popliteal to mid CIV on left side. Left civ is small and after thrombus removal had cfv thrombus still noted after repeat removal and balloon maceration great improvement in thrombus load noted and good flow on venography noted. Left civ still small concern for vascular web given contralateral side. So stent placed. After stent placement massive improvement of flow identified and felt no need for thrombolysis at this time. Will require at least 3 months but would recommend at least 6 months of full anticoagulation at this time. Will also need to consider life long suppression with low dose xarelto.

## 2018-01-11 NOTE — CONSULTS
[facility]. Pulmonary, Critical Care, and Sleep Medicine    Initial Patient Consult    Name: Patrick Peters MRN: [id number]   : 1945 Hospital: [address] [place] [address]    Date: 2018        IMPRESSION:   · 1. Acute Extensive Occlusive Left LE DVT (3rd time in life- 1st in 1970s, then in )- Unprovoked, raises concern for hypercoaguable state vs May Thurner Syndrome, No family hx- On heparin gtt, being evaluated for local lysis of clot burden by vascular. Discussed with them, post lysis they will check from venous comprerssion (May Thurner)    · 2. B/L Small Acute NON MASSIVE PE- no symtopms or hypoxia. · 3. RLL peripheral opacity abutting pleura with NO MEDIASTINAL LYMPHADENOPATHY- question has been raised about Lung Mass. Highly suspicious for Infarcts related to PE (peripheral, almost wedge shaped, at end of blood vessels). · 4. 10 Pounds Un-Intentional weight loss over 1 year with normal appetite, NO other B- Symptoms. CT abd/plevis did not reveal any masses/lymph nodes. CT chest as mentioned above. RECOMMENDATIONS:   · 1. I think those RLL pacities could be infarcts from the PE (peripheral in lower lobes, almost wedge shaped, at end of blood vessels). No mediastinal LNs noted which can be accessible with bronch. I will repeat CT scan in 8 weeks and f/u in Pulmonary clinic with Dr. Sameera Henderson. If lesions look bigger or same size she will need CT guided needle biopsy    · 2. Will send for Fact V Leiden, Lupus anticoagulant, Prothrombin gene mutation, homcysteine for hypercoaguable w/u as these are not affected ny A/c or acute clot. Will need Protein C,S, AT 3, Factor 8 after the acute phase is over. · 3. Cont heparin gtt until after the procedure. Will need LifeLong A/c given 3rd DVT. Can start NOAC later. · 4. Send for UA w micro to look for occult blood and FOBT- w/u of renal/colon cancer- did not see any masses on CT scan though. · 5.   Bedrest Until after clot lysis. WILL FOLLOW WITH YOU     Subjective: This patient has been seen and evaluated at the request of Dr. Kristy Wright for Lung mass. Patient is a 71 y.o. female admitted with 3rd episode of extensive LLE DVT in life. 1 st episode in 76s (blamed on OCPs), second episode in 90s. Got coumadin for those. Has no provoking factors. Active at baseline. Works in a factory. No recent travel or Surgery or sedentary phase Never been worked up for hypercoaguable w/u. No family hx of clots. Denies miscarraiges. Has 2 benign Left breast lumps removed in early 90s. Haven't noticed any swellings or lumps in body since. Is a NON smoker. No cough/ chest pain/ SOB/hemoptysis. Denies dark or bloody urine/ dark or bloody stools. Denies dysfunctional uterine bleeding. Has good appetite. Denies Night sweats since after menopause. HAS LOST 10 POUNDS UN-INTENTIONALLY OVER LAST 1 YEAR>      Past Medical History:   Diagnosis Date    Essential hypertension, benign     Nonspecific abnormal electrocardiogram (ECG) (EKG)     Undiagnosed cardiac murmurs     Unspecified congenital defect of septal closure       No past surgical history on file. Prior to Admission medications    Medication Sig Start Date End Date Taking? Authorizing Provider   amLODIPine (NORVASC) 5 mg tablet Take 5 mg by mouth daily. Yes Historical Provider   ergocalciferol (VITAMIN D2) 50,000 unit capsule Take 50,000 Units by mouth every seven (7) days. Yes Historical Provider     No Known Allergies   Social History   Substance Use Topics    Smoking status: Never Smoker    Smokeless tobacco: Not on file    Alcohol use No      No family history on file.      Current Facility-Administered Medications   Medication Dose Route Frequency    heparin 25,000 units in D5W 250 ml infusion  18-36 Units/kg/hr IntraVENous TITRATE    sodium chloride (NS) flush 5-10 mL  5-10 mL IntraVENous Q8H    docusate sodium (COLACE) capsule 100 mg  100 mg Oral BID Review of Systems:  Pertinent items are noted in HPI. ROS    Objective:   Vital Signs:    Visit Vitals    /84 (BP 1 Location: Left arm, BP Patient Position: At rest)    Pulse 73    Temp 98.9 °F (37.2 °C)    Resp 18    Ht 5' 11\" (1.803 m)    Wt 58.6 kg (129 lb 1.6 oz)    SpO2 100%    BMI 18.01 kg/m2       O2 Device: Room air       Temp (24hrs), Av.4 °F (37.4 °C), Min:98.6 °F (37 °C), Max:100.2 °F (37.9 °C)       Intake/Output:   Last shift:       07 - 1900  In: 132 [I.V.:132]  Out: -   Last 3 shifts:  190 -  0700  In: 360 [P.O.:360]  Out: 600 [Urine:600]    Intake/Output Summary (Last 24 hours) at 18 1036  Last data filed at 18 0737   Gross per 24 hour   Intake              492 ml   Output              600 ml   Net             -108 ml      Physical Exam:   General:  Alert, cooperative, no distress, appears stated age. Head:  Normocephalic, without obvious abnormality, atraumatic. Eyes:  Conjunctivae/corneas clear. ANicteric   Nose: Nares normal. Mucosa normal. No drainage or sinus tenderness. Throat: Lips, mucosa dry. NO thrush;    Neck: Supple, symmetrical, trachea midline, no adenopathy, thyroid: no enlargment/tenderness/nodules, no carotid bruit and no JVD. No crepitus   Back:   Symmetric, no curvature, no spine tenderness or flank pain   Lungs:   Bilateral auscultation CTA b/l   Chest wall:  No tenderness or deformity. NO CREPITUS   Heart:  Regular rate and rhythm, S1, S2 normal, no murmur, click, rub or gallop. Abdomen:   Soft, non-tender. Bowel sounds normal. No masses,  No organomegaly. No paradox   Extremities: normal, atraumatic, no cyanosis or Left LE edema upto thigh   Pulses: 1-2+ and symmetric all extremities.    Skin: Skin color, texture, turgor normal. No rashes or lesions   Lymph nodes: Cervical, supraclavicular, and axillary nodes normal.   Neurologic: Grossly nonfocal          Imaging:  I have personally reviewed the patients radiographs and have reviewed the reports:         High complexity decision making was performed during the evaluation of this patient at high risk for decompensation with multiple organ involvement     Above mentioned total time spent on reviewing the case/medical record/data/notes/EMR/patient examination/documentation/coordinating care with nurse/consultants, exclusive of procedures with complex decision making performed and > 50% time spent in face to face evaluation.      Karina Frank MD

## 2018-01-11 NOTE — ROUTINE PROCESS
Bedside shift change report given to Rome Gil RN (oncoming nurse) by Betsy Gordon RN (offgoing nurse). Report included the following information SBAR, Kardex and Recent Results.

## 2018-01-11 NOTE — ROUTINE PROCESS
Bedside shift change report given to Larry Koch (oncoming nurse) by Keturah Bond RN (offgoing nurse). Report included the following information SBAR, Kardex and Recent Results.

## 2018-01-11 NOTE — PROGRESS NOTES
No extensive DVT identified. Has old chronic clot noted in pop, femoral veins and CFV. Had large web within CIV. Ballooned and had opened but continued with intravascular web. Decision made to stent. No may-thurner identified but web is now gone and improved flow noted within iliac system. No thrombolysis performed given no occlusive DVT identified.

## 2018-01-11 NOTE — ROUTINE PROCESS
Bedside shift change report given to Radha Weinstein RN (oncoming nurse) Cass Haines RN (offgoing nurse). Report included the following information SBAR, Kardex and Recent Results.

## 2018-01-11 NOTE — H&P (VIEW-ONLY)
Surgery Consult      Patient: Carissa Barclay MRN: [id number]  CSN: [id number]      YOB: 1945    Age: 71 y.o. Sex: female      DOA: 1/9/2018       HPI:     Carissa Barclay is a 71 y.o. female who presents with extensive left leg DVT and PE  She denies any recent travel or injury. No prolonged illness. She was confined to her home during the snow days but was not unusually sedentary  She even had very recent physical with her PCP and had a good check up! But she started noticing worsening edema prompting her to ED  She had remote history of a Right leg DVT, perhaps 20 years ago  She is now on heparin drip  She reports that some of the leg edema has decreased overnight    DVT is extensive, up to iliac level, and it is questioned if lysis therapy is indicated  She also has bilateral PE  She is on heparin gtt per protocol  Also of note is pulmonary mass which could correlate to a hypercoaguable state as cause of DVT      Past Medical History:   Diagnosis Date    Essential hypertension, benign     Nonspecific abnormal electrocardiogram (ECG) (EKG)     Undiagnosed cardiac murmurs     Unspecified congenital defect of septal closure        No past surgical history on file. No family history on file. Social History     Social History    Marital status:      Spouse name: N/A    Number of children: N/A    Years of education: N/A     Social History Main Topics    Smoking status: Never Smoker    Smokeless tobacco: Not on file    Alcohol use No    Drug use: Not on file    Sexual activity: Not on file     Other Topics Concern    Not on file     Social History Narrative    No narrative on file       Prior to Admission medications    Medication Sig Start Date End Date Taking? Authorizing Provider   amLODIPine (NORVASC) 5 mg tablet Take 5 mg by mouth daily. Yes Historical Provider   ergocalciferol (VITAMIN D2) 50,000 unit capsule Take 50,000 Units by mouth every seven (7) days.    Yes Historical Provider       No Known Allergies    Review of Systems  Review of Systems - History obtained from chart review and the patient  Constitutional: Negative for chills and fever. HENT: Negative for rhinorrhea. Respiratory: Negative for shortness of breath. Cardiovascular: Negative for chest pain. Gastrointestinal: Negative for abdominal pain, nausea and vomiting. Endocrine: Negative for polyuria. Genitourinary: Negative for dysuria. Musculoskeletal: Positive for myalgias  Skin: Negative for rash. Neurological: Negative for headaches  Vascular: left leg edema    Physical Exam:      Visit Vitals    /63 (BP 1 Location: Left arm, BP Patient Position: At rest)    Pulse 69    Temp 99.4 °F (37.4 °C)    Resp 20    Ht 5' 11\" (1.803 m)    Wt 136 lb 11.2 oz (62 kg)    SpO2 100%    BMI 19.07 kg/m2       Physical Exam:  A/O x 3, NAD  Notable Left leg edema including thigh and lower leg  Foot warm and well perfused, palpable pulses  No right leg edema     Data Review:    CBC:   Lab Results   Component Value Date/Time    WBC 5.0 01/10/2018 03:11 AM    RBC 4.32 01/10/2018 03:11 AM    HGB 11.1 01/10/2018 03:11 AM    HCT 35.3 01/10/2018 03:11 AM    PLATELET 630 84/44/8760 03:11 AM      BMP:   Lab Results   Component Value Date/Time    Glucose 100 01/10/2018 03:11 AM    Sodium 137 01/10/2018 03:11 AM    Potassium 3.5 01/10/2018 03:11 AM    Chloride 103 01/10/2018 03:11 AM    CO2 29 01/10/2018 03:11 AM    BUN 8 01/10/2018 03:11 AM    Creatinine 0.58 01/10/2018 03:11 AM    Calcium 8.7 01/10/2018 03:11 AM     Coagulation:   Lab Results   Component Value Date/Time    Prothrombin time 14.0 01/09/2018 10:47 AM    INR 1.1 01/09/2018 10:47 AM    aPTT 113.6 01/10/2018 03:11 AM     Left leg :  1. Extensive acute occlusive deep venous thrombosis identified in the  common femoral, femoral,  popliteal, soleal, gastrocnemius and deep  femoral veins.   2. Deep veins visualized include the common femoral, femoral, deep  femoral, popliteal, gastrocnemius, soleal, posterior tibial and  peroneal veins. 3. Acute occlusive superficial venous thrombosis identified in the  great saphenous at the sapheno-femoral junction. 4. Superficial veins visualized include the proximal great saphenous  vein. 5. The left posterior tibial and anterior tibial arteries are patent  with multiphasic flow. 6. No evidence of deep vein thrombosis in the contralateral common  femoral vein. Chest CT  1. Bilateral pulmonary embolism. Greater burden on the left side.     2. Right lower lobe anterior basal segment peripheral pleura-abutting mass. Recommend PET-CT for further delineation.     3. Branching peripheral tubular density in the left lower lobe, probably a  focus of mucus impaction. CT abdomen    1. Left femoral vein DVT extending to the common iliac vein. No DVT in the IVC      - Right lower lobe mass. May correlate with hypercoagulable state.     2. No acute findings along the gastrointestinal tract.      3. Fibroid uterus.     4.  Left renal hypodensity, favor renal cortical cyst    Assessment/Plan     Seemingly unprovoked but extensive DVT  Discussed consideration of lysis therapy  Details for that procedure explained in detail, including continued need for anticoagulation afterwards  She is thinking about it and wants to discuss with daughter  She is agreeable to tentatively schedule procedure with dr Carolee Stark tomorrow  Pulmonary eval for lung mass?     Principal Problem:    Pulmonary emboli ([address] (1/9/2018)    Active Problems:    Right lower lobe lung mass (1/9/2018)      Acute deep vein thrombosis (DVT) of left lower extremity ([address] (1/9/2018)        JANEEN Rodríguez  January 10, 2018

## 2018-01-11 NOTE — PROGRESS NOTES
Patient is not available to be assessed at this time.      [facility] Certified [address] [place] [address]   [phone]

## 2018-01-12 VITALS
HEIGHT: 71 IN | WEIGHT: 139.33 LBS | DIASTOLIC BLOOD PRESSURE: 55 MMHG | SYSTOLIC BLOOD PRESSURE: 104 MMHG | OXYGEN SATURATION: 100 % | BODY MASS INDEX: 19.51 KG/M2 | RESPIRATION RATE: 17 BRPM | TEMPERATURE: 98.2 F | HEART RATE: 90 BPM

## 2018-01-12 LAB
ANION GAP SERPL CALC-SCNC: 6 MMOL/L (ref 3–18)
APTT PPP: 97.1 SEC (ref 23–36.4)
BASOPHILS # BLD: 0 K/UL (ref 0–0.1)
BASOPHILS NFR BLD: 0 % (ref 0–2)
BUN SERPL-MCNC: 10 MG/DL (ref 7–18)
BUN/CREAT SERPL: 14 (ref 12–20)
CALCIUM SERPL-MCNC: 9.1 MG/DL (ref 8.5–10.1)
CHLORIDE SERPL-SCNC: 102 MMOL/L (ref 100–108)
CO2 SERPL-SCNC: 30 MMOL/L (ref 21–32)
CREAT SERPL-MCNC: 0.74 MG/DL (ref 0.6–1.3)
DIFFERENTIAL METHOD BLD: ABNORMAL
EOSINOPHIL # BLD: 0 K/UL (ref 0–0.4)
EOSINOPHIL NFR BLD: 0 % (ref 0–5)
ERYTHROCYTE [DISTWIDTH] IN BLOOD BY AUTOMATED COUNT: 14.3 % (ref 11.6–14.5)
GLUCOSE SERPL-MCNC: 101 MG/DL (ref 74–99)
HCT VFR BLD AUTO: 36.8 % (ref 35–45)
HCYS SERPL-SCNC: 16.1 UMOL/L (ref 0–15)
HGB BLD-MCNC: 12 G/DL (ref 12–16)
LYMPHOCYTES # BLD: 1.1 K/UL (ref 0.9–3.6)
LYMPHOCYTES NFR BLD: 15 % (ref 21–52)
MCH RBC QN AUTO: 26.4 PG (ref 24–34)
MCHC RBC AUTO-ENTMCNC: 32.6 G/DL (ref 31–37)
MCV RBC AUTO: 80.9 FL (ref 74–97)
MONOCYTES # BLD: 0.6 K/UL (ref 0.05–1.2)
MONOCYTES NFR BLD: 8 % (ref 3–10)
NEUTS SEG # BLD: 5.5 K/UL (ref 1.8–8)
NEUTS SEG NFR BLD: 77 % (ref 40–73)
PLATELET # BLD AUTO: 179 K/UL (ref 135–420)
PMV BLD AUTO: 10 FL (ref 9.2–11.8)
POTASSIUM SERPL-SCNC: 3.7 MMOL/L (ref 3.5–5.5)
RBC # BLD AUTO: 4.55 M/UL (ref 4.2–5.3)
SODIUM SERPL-SCNC: 138 MMOL/L (ref 136–145)
WBC # BLD AUTO: 7.2 K/UL (ref 4.6–13.2)

## 2018-01-12 PROCEDURE — 74011250637 HC RX REV CODE- 250/637: Performed by: INTERNAL MEDICINE

## 2018-01-12 PROCEDURE — 77030012891

## 2018-01-12 PROCEDURE — 85025 COMPLETE CBC W/AUTO DIFF WBC: CPT | Performed by: SURGERY

## 2018-01-12 PROCEDURE — 36415 COLL VENOUS BLD VENIPUNCTURE: CPT | Performed by: SURGERY

## 2018-01-12 PROCEDURE — 80048 BASIC METABOLIC PNL TOTAL CA: CPT | Performed by: SURGERY

## 2018-01-12 PROCEDURE — 85730 THROMBOPLASTIN TIME PARTIAL: CPT | Performed by: SURGERY

## 2018-01-12 PROCEDURE — 74011250637 HC RX REV CODE- 250/637: Performed by: HOSPITALIST

## 2018-01-12 RX ORDER — OXYCODONE AND ACETAMINOPHEN 5; 325 MG/1; MG/1
1 TABLET ORAL
Qty: 10 TAB | Refills: 0 | Status: SHIPPED | OUTPATIENT
Start: 2018-01-12

## 2018-01-12 RX ADMIN — HYDROCODONE BITARTRATE AND ACETAMINOPHEN 1 TABLET: 7.5; 325 TABLET ORAL at 08:08

## 2018-01-12 RX ADMIN — APIXABAN 10 MG: 5 TABLET, FILM COATED ORAL at 13:00

## 2018-01-12 RX ADMIN — Medication 5 ML: at 06:00

## 2018-01-12 RX ADMIN — DOCUSATE SODIUM 100 MG: 100 CAPSULE, LIQUID FILLED ORAL at 08:08

## 2018-01-12 RX ADMIN — AMLODIPINE BESYLATE 5 MG: 5 TABLET ORAL at 08:08

## 2018-01-12 NOTE — PROGRESS NOTES
[address] [place] [address] [facility]  Pulmonary, Critical Care, and Sleep Medicine    Name: Misa Perez MRN: [id number]   : 1945 Hospital: [facility]   Date: 2018        IMPRESSION:   · 1. Acute Extensive Occlusive Left LE DVT (3rd time in life- 1st in 1970s, then in ) and Chronic RLE DVT- Unprovoked, raises concern for hypercoaguable state vs due to venous narrowing with webs No family hx.    - On heparin gtt, -Status post lysis (old clots were found in right side too), received b/l stents in iliac vessels due to webs in veins. Pending hypercoaguable w/u Fact V Leiden, Lupus anticoagulant, Prothrombin gene mutation, homcysteine for hypercoaguable w/u as these are not affected ny A/c or acute clot. Will need Protein C,S, AT 3, Factor 8 after the acute phase is over.     · 2. B/L Small Acute NON MASSIVE PE- no symtopms or hypoxia.     · 3. RLL peripheral opacity abutting pleura with NO MEDIASTINAL LYMPHADENOPATHY- question has been raised about Lung Mass. Highly suspicious for Infarcts related to PE (peripheral, almost wedge shaped, at end of blood vessels).     · 4. 10 Pounds Un-Intentional weight loss over 1 year with normal appetite, NO other B- Symptoms. CT abd/plevis did not reveal any masses/lymph nodes. CT chest as mentioned above.       RECOMMENDATIONS:   · 1. Switch to Eliquis 10 mg PO BID x 7days then 5 mg PO BID (order placed). She will need A/c for life. F/u as outpatient with Dr. Nikia Chavez in Pulmonary clinic in 2-3 weeks post discharge for Thromboembolic disease.  - Pending hypercoaguable w/u Fact V Leiden, Lupus anticoagulant, Prothrombin gene mutation, homcysteine for hypercoaguable w/u as these are not affected ny A/c or acute clot. Will need Protein C,S, AT 3, Factor 8 after the acute phase is over. 2. For RLL opacities which I think  could be infarcts from the PE (peripheral in lower lobes, almost wedge shaped, at end of blood vessels).  No mediastinal LNs noted which can be accessible with bronch. She will need repeat CT scan in 8 weeks and f/u in Pulmonary clinic with Dr. Bella Romero. If lesions look bigger or same size she will need CT guided needle biopsy. She may be discharged to home today or tomorrow from pulmonary stand point if it is ok with Vascular and primary team.  WILL SIGN  OFF      INTERVAL:  Did well after clot lysis. Got b/l stents for venous webs. Doing well this morning. No hematoma at the site. Breathing is fine. HPI:      This patient has been seen and evaluated at the request of Dr. aBy Hoang for Lung mass. Patient is a 71 y.o. female admitted with 3rd episode of extensive LLE DVT in life. 1 st episode in 76s (blamed on OCPs), second episode in 90s. Got coumadin for those.      Has no provoking factors. Active at baseline. Works in a factory. No recent travel or Surgery or sedentary phase Never been worked up for hypercoaguable w/u. No family hx of clots. Denies miscarraiges.      Has 2 benign Left breast lumps removed in early . Haven't noticed any swellings or lumps in body since. Is a NON smoker. No cough/ chest pain/ SOB/hemoptysis. Denies dark or bloody urine/ dark or bloody stools. Denies dysfunctional uterine bleeding. Has good appetite.  Denies Night sweats since after menopause.     HAS LOST 10 POUNDS UN-INTENTIONALLY OVER LAST 1 YEAR>    Objective:   Vital Signs:    Visit Vitals    /54    Pulse 88    Temp 98.5 °F (36.9 °C)    Resp 15    Ht 5' 11\" (1.803 m)    Wt 63.2 kg (139 lb 5.3 oz)    SpO2 99%    BMI 19.43 kg/m2       O2 Device: Room air       Temp (24hrs), Av °F (37.2 °C), Min:98.5 °F (36.9 °C), Max:99.6 °F (37.6 °C)       Intake/Output:   Last shift:      701 - 1900  In: 240 [P.O.:240]  Out: 100 [Urine:100]  Last 3 shifts: 01/10 1901 -  0700  In: 7150 [P.O.:580; I.V.:603]  Out: 1650 [Urine:1650]    Intake/Output Summary (Last 24 hours) at 18 1119  Last data filed at 18 9255 Gross per 24 hour   Intake              951 ml   Output             1150 ml   Net             -199 ml      Physical Exam:   General:  Alert, cooperative, no distress, appears stated age. Head:  Normocephalic, without obvious abnormality, atraumatic. Eyes:  Conjunctivae/corneas clear. ANicteric   Nose: Nares normal. Mucosa normal. No drainage or sinus tenderness. Throat: Lips, mucosa dry. NO thrush;    Neck: Supple, symmetrical, trachea midline, no adenopathy, thyroid: no enlargment/tenderness/nodules, no carotid bruit and no JVD. No crepitus   Back:   Symmetric, no curvature, no spine tenderness or flank pain   Lungs:   Bilateral auscultation CTA b/l   Chest wall:  No tenderness or deformity. NO CREPITUS   Heart:  Regular rate and rhythm, S1, S2 normal, no murmur, click, rub or gallop. Abdomen:   Soft, non-tender. Bowel sounds normal. No masses,  No organomegaly. No paradox   Extremities: normal, atraumatic, no cyanosis or left leg edema is better now. No hematoma at puncture site   Pulses: 1-2+ and symmetric all extremities.    Skin: Skin color, texture, turgor normal. No rashes or lesions   Lymph nodes: Cervical, supraclavicular, and axillary nodes normal.   Neurologic: Grossly nonfocal          Imaging:  I have personally reviewed the patients radiographs and have reviewed the reports:           DATA:  Labs:  Recent Labs      01/12/18   0558  01/11/18   0235  01/10/18   0311   WBC  7.2  6.1  5.0   HGB  12.0  10.9*  11.1*   HCT  36.8  34.5*  35.3   PLT  179  160  114*     Recent Labs      01/12/18   0558  01/11/18   0235  01/10/18   0311   NA  138  138  137   K  3.7  3.7  3.5   CL  102  103  103   CO2  30  28  29   GLU  101*  91  100*   BUN  10  6*  8   CREA  0.74  0.53*  0.58*   CA  9.1  8.9  8.7     Imaging:  [x]I have personally reviewed the patients radiographs  []Radiographs reviewed with radiologist   []No change from prior, tubes and lines in adequate position  []Improved   []Worsening    High complexity decision making was performed during the evaluation of this patient at high risk for decompensation with multiple organ involvement     Above mentioned total time spent on reviewing the case/medical record/data/notes/EMR/patient examination/documentation/coordinating care with nurse/consultants, exclusive of procedures with complex decision making performed and > 50% time spent in face to face evaluation.     Cony Cardenas MD

## 2018-01-12 NOTE — PROGRESS NOTES
Full discharge instruction include medication education (prescription given), incisions management, and appointments given to pt and daughter at bedside. Awaiting for medication refill from [facility]. 1653  Discharged to private vehicle via wheelchair without difficulty.

## 2018-01-12 NOTE — PROGRESS NOTES
1944 - Pt arrived to unit via stretcher on monitor. Pt is on Heparin gtt. Pulses checked with doppler. Dressing to right popliteal saturated with blood. Changed, applied new dressing. 2000 - Assessment completed. Finished admission database with pt. Pt self independent at home, still works at [employer]. Has daughter, siblings, nieces, grandchildren all close by. A/O x4, current bed rest for now. Pt is clear on RA, Currently BP elevated. No medications ordered, Pt normally takes 5mg Norvasc but has not had any since admission. Paged and spoke to hospitalist. Ordered 10mg Norvasc now since /90 and to start with pt's normal dose in the AM. Last PTT was this AM for Heparin gtt and therapeutic, per order set to check PTT next AM. HR NSR. Last BM 1/10/18. Has Hunt, draining dark red. Per Dr. Neri Jordan to be expected due to the Angio Jet dye and from the clot. Pt currently cool to touch, states cold at this time, applied additional blanket. Pt has strong pulses heard with doppler. 2120 - Dr. Neri Jordan at bedside. Showed him the urine color and ok, will look like that for next 12 hours or so.   0000 - Blood pressure improved. Pt doing well. Pulses checked with doppler hourly. Skin temp warmer. Pt states still chilled. Denies need for additional blanket. Encouraged movement in bed and assisted in turning on right side. Dressings to popliteal intact. Left popliteal with small break through drainage. 0500 - Urine becoming lighter, dusky red tinged color. Removed hunt. Pulses remain intact. Pt denies any pain all shift. Pt left in bed talking on cell phone. 0630 - Pt completed full mouth, brushed teeth, sheldon care. New gown, linen. Pt sitting up in chair watching TV and has crossword puzzle. Pt voided since hunt removed. Pt ambulated in room. Left leg still swollen +2 and slightly weaker than right.

## 2018-01-12 NOTE — PROGRESS NOTES
Patient sitting in chair. She feels great with both of her legs. Both have less swelling obviously more dramatic on the left leg. She currently has no edema to the right leg and trace on the left. She already states that both legs feel better with less heaviness and discomfort. Her right leg still has some varicose veins and what appears to be a healed ulcer on the lateral distal calf. Left leg still with some mild varicosities no skin changes. Let patient know that we will follow up in clinic on bilateral CIV stents placed to improve her CVI class III bilaterally although could be class V on right side but patient is slightly poor historian on when and how long had wound to leg. Very possible had it from her venous disease but was able to heal it. Has had DVT at least times one on right and at least times three on left. Had vascular webs on right CIV treated with stent and small left civ with likely webs given right civ again treated with stent. Overall feels great. Fine for discharge from our point of view will follow up in clinic in 2 weeks.

## 2018-01-12 NOTE — OP NOTES
[address] [place] [address] Dr  OPERATIVE REPORT    Niels Nolan  MR#: [id number]  : 1945  ACCOUNT #: [de-identified]   DATE OF SERVICE: 2018    ATTENDING SURGEON:  Annika Angulo MD    PREOPERATIVE DIAGNOSIS:  Bilateral chronic venous insufficiency with known extensive clot within the left lower extremity vein and previous clots within the right lower extremity venous system, at least class 3 chronic venous insufficiency on bilateral lower extremities. POSTOPERATIVE DIAGNOSIS:  Bilateral chronic venous insufficiency with known extensive clot within the left lower extremity vein and previous clots within the right lower extremity venous system, at least class 3 chronic venous insufficiency on bilateral lower extremities. PROCEDURES PERFORMED:    1. Ultrasound-guided access of bilateral popliteal veins. 2.  Bilateral lower extremity venogram.  3.  Venacavogram.  4.  Catheter into vena cava. 5.  AngioJet mechanical suction thrombectomy of the left lower extremity, popliteal vein, femoral vein, common femoral vein, external iliac vein and common iliac vein. 6.  Transluminal intravascular placement of stent within the left common iliac vein, 18 x 90 Wallstent. 7.  Transluminal intravascular stent within the right common iliac vein, 16 x 60 Wallstent. 8.  Balloon angioplasty of the left common femoral vein. 9.  Moderate conscious sedation of 94 minutes. 10.  Intravascular ultrasound of inferior vena cava, bilateral common iliac veins, bilateral external iliac veins, bilateral common femoral veins, bilateral femoral veins and bilateral popliteal veins. ANESTHESIA:  Monitored conscious sedation in 94 minutes. This was provided by an independent provider, given the medication per my description, monitoring the vital signs throughout the case. COMPLICATIONS:  None. CULTURES:  None. DRAINS:  None. ESTIMATED BLOOD LOSS:  Less than 50 mL. SPECIMENS:  None.     INDICATIONS FOR PROCEDURES:  The patient is a 71-year-old female with known extensive DVT of the left lower extremity. On talking with her in the preop area, it was found that she had a class 3 chronic venous insufficiency of the contralateral leg and stated to her that if she wanted us to move forward with a venogram on that side, as we were already doing a procedure on the left side, we would be happy to. Initially, she declined, but when we were back in the procedure room, she preferred to move forward with the procedure, so we went ahead and did the right side as well. Patient was given all of the risks and benefits of the procedure, including, but not limited to bleeding, infection, damage to adjacent structures, MI, stroke and death, as well as a DVT, PE and loss of lower extremity. Patient was understanding of all of the risks and underwent the procedure. OPERATIVE FINDINGS:  Right side shows chronic DVT within the popliteal, femoral vein, common femoral vein and iliac system, but there is an intravascular web within the common iliac vein. This was ballooned and continued to be present, so a 16 x 60 Wallstent was placed with rapid blood flow going through and much improved flow by IVUS imaging. On the left side, patient has acute DVT with some chronic DVT within the popliteal, femoral, common femoral, external iliac and common iliac vein, and a very small left common iliac vein. Given the fact that the patient also had some webbing on the right side, it was felt that she probably had some webbing on the left side, so we stented this as well with the thrombosis at that area and had a very good result. PROCEDURES IN DETAIL:  Patient was correctly identified in the precath area and taken to the cath lab in stable condition. Patient had a preincision time-out prior to the incision. Patient was prepped and draped in the normal sterile fashion according to the CDC guidelines aseptic technique.       We then used an ultrasound to visualize bilateral popliteal veins. A picture was taken and a copy to the patient's chart. We had a lot of difficulty on the right side, as there was a lot of chronic DVT and small veins on that side, but we did gain access with a micropuncture needle and placed a mandril wire in place, and a 4 Emirati micropuncture sheath was placed, and an inner dilator and angled Glidewire Advantage wire were then placed. We were able to then manipulate our way all the way up into the IVC. I did IVUS imaging showing the chronic DVT, as well as the vascular web, so we went ahead and we ballooned that with a 16 mm balloon, and then decided to stent it with a 16 x 60 Wallstent and post-dilated with a 16 mm balloon. We did have to upsize to a 7-Emirati sheath on that side. We then gained accessed as above on the left side and placed an 8-Emirati sheath, and again with the Glidewire Advantage wire, gained access into the IVC and performed IVUS imaging, and then using a Zelante AngioJet catheter, we were able to perform power pulse spray of 10 mg of tPA and 100 mL bag of saline for just over 20 minutes, and then did four passes. We had a pretty good result. I did additional 4 passes at the common femoral vein. I did some further imaging with IVUS. I saw that we pretty much cleared out everything in the popliteal and femoral areas with still some chronic and moderate amounts of thrombus within the common femoral vein, but it did not appear to be flow limiting, given venography, but we went ahead and stented the common iliac vein, given its small size with an 18 x 90 Wallstent, post-dilating with a 16 mm balloon, and we balloon macerated the common femoral vein with a 12 mm balloon. We then reperformed our IVUS imaging and saw a pretty good result, again, with some moderate amounts of thrombus within the common femoral vein, and on venogram, there was good flow going through the stent.   It was felt not to perform any thrombolysis, given the fact that we had already done a procedure on the contralateral side, and she was hypertensive into the 200s. We went ahead and pulled all of our accesses. Of note, we did heparinize the patient upon placing our stents. Patient tolerated the procedures well and was taken to the ICU postoperatively.       MD ZULLY Ray / DANIEL  D: 01/11/2018 21:18     T: 01/12/2018 01:39  JOB #: [id number]

## 2018-01-12 NOTE — DISCHARGE SUMMARY
Discharge Summary    Patient: Alon Cuellar MRN: [id number]  CSN: [id number]    YOB: 1945  Age: 71 y.o. Sex: female    DOA: 1/9/2018 LOS:  LOS: 3 days   Discharge Date:      Admission Diagnoses: Pulmonary embolism ([facility]  DVT (deep venous thrombosis) ([facility]  Pulmonary emboli ([facility]    Discharge Diagnoses:  PLEASE SEE DICTATION. Discharge Condition: Stable    PHYSICAL EXAM  Visit Vitals    /55    Pulse 90    Temp 98.2 °F (36.8 °C)    Resp 17    Ht 5' 11\" (1.803 m)    Wt 63.2 kg (139 lb 5.3 oz)    SpO2 100%    BMI 19.43 kg/m2       General: Alert, cooperative, no acute distress    Lungs:  CTA Bilaterally. No Wheezing/Rhonchi/Rales. Heart:  Regular rate and Rhythm. Abdomen: Soft, Non distended, Non tender. + Bowel sounds. Extremities: Min L leg swelling, incision site clean. No cyanosis/ clubbing  Psych:   Good insight. Not anxious or agitated. Neurologic:  AA oriented X 3. Moves all extremities. Hospital Course: Please see dictation. code # [id number]. Discharge Medications:     Current Discharge Medication List      START taking these medications    Details   !! apixaban (ELIQUIS) 5 mg tablet Take 1 Tab by mouth two (2) times a day. Indications: pulmonary thromboembolism  Qty: 60 Tab, Refills: 0      !! apixaban (ELIQUIS) 5 mg tablet Take 2 Tabs by mouth two (2) times a day. Indications: pulmonary thromboembolism  Qty: 13 Tab, Refills: 0      oxyCODONE-acetaminophen (PERCOCET) 5-325 mg per tablet Take 1 Tab by mouth every four (4) hours as needed. Max Daily Amount: 6 Tabs. Qty: 10 Tab, Refills: 0    Associated Diagnoses: Other acute pulmonary embolism without acute cor pulmonale (HCC)       ! ! - Potential duplicate medications found. Please discuss with provider. CONTINUE these medications which have NOT CHANGED    Details   amLODIPine (NORVASC) 5 mg tablet Take 5 mg by mouth daily.       ergocalciferol (VITAMIN D2) 50,000 unit capsule Take 50,000 Units by mouth every seven (7) days. · It is important that you take the medication exactly as they are prescribed. · Keep your medication in the bottles provided by the pharmacist and keep a list of the medication names, dosages, and times to be taken in your wallet. · Do not take other medications without consulting your doctor. DIET:  Cardiac Diet    ACTIVITY: Activity as tolerated    ADDITIONAL INFORMATION: If you experience any of the following symptoms but not limited to Fever, chills, nausea, vomiting, diarrhea, change in mentation, falling, bleeding, shortness of breath, chest pain, please call your primary care physician or return to the emergency room if you cannot get hold of your doctor:     FOLLOW UP CARE:  Dr. Jamari Mcgill MD in 7-10 days. Please call and set up an appointment.   Dr. Travis Stanford in 2 week  Dr. Demond Santos in 2 week      Minutes spent on discharge: 40 minutes spent coordinating this discharge (review instructions/follow-up, prescriptions, preparing report for sign off)    Mulugeta Pena MD  1/12/2018 1:53 PM

## 2018-01-12 NOTE — ROUTINE PROCESS
TRANSFER - OUT REPORT:    Verbal report given to Chester Bland RN(name) on Abbi Pink  being transferred to [facility]) for routine progression of care       Report consisted of patients Situation, Background, Assessment and   Recommendations(SBAR). Information from the following report(s) SBAR, Procedure Summary and MAR was reviewed with the receiving nurse. Lines:   Peripheral IV Left Antecubital (Active)   Site Assessment Clean, dry, & intact 1/11/2018  8:22 AM   Phlebitis Assessment 0 1/11/2018  8:22 AM   Infiltration Assessment 0 1/11/2018  8:22 AM   Dressing Status Clean, dry, & intact 1/11/2018  8:22 AM   Dressing Type Transparent 1/11/2018  8:22 AM   Hub Color/Line Status Pink 1/11/2018  8:22 AM   Alcohol Cap Used Yes 1/10/2018  7:52 PM       Peripheral IV 01/11/18 Right;Upper (Active)        Opportunity for questions and clarification was provided.       Patient transported with:   Monitor  Tech

## 2018-01-12 NOTE — DISCHARGE SUMMARY
[facility]    Arben Zapata  MR#: [id number]  : 1945  ACCOUNT #: [de-identified]   ADMIT DATE: 2018  DISCHARGE DATE: 2018    PRIMARY CARE PHYSICIAN:  Dr. Nunu Parsons    DISPOSITION:  Discharged to home. DISCHARGE CONDITION:  Stable. DISCHARGE DIAGNOSES:  1. Acute extensive occlusive left lower extremity deep vein thrombosis, recurrent, status post bilateral lower extremity venogram with mechanical suction with thrombectomy. 2.  Bilateral small non obst pulmonary embolus. 3.  Right lower lobe lung peripheral opacity with no mediastinal mass, concern for lung mass, but highly suspicious for infarct. Pulmonary recommends outpatient followup. 4.  Hypertension. DISCHARGE MEDICATIONS:  1.  Eliquis 10 mg b.i.d. for the next 7 days and then 5 mg b.i.d.  2.  Percocet 1 tablet every 6 hours p.r.n.   3.  Vitamin D2 50,000 international units q.7 days. 4.  Amlodipine 5 mg daily. CONSULTATIONS IN HOSPITAL:   1.  Dr. Maikol Sandoval, pulmonary consultation. 2.  Dr. Silvana Bedolla, vascular surgery. HOSPITAL COURSE:  This is a 71-year-old -American female who presents to the emergency room with leg pain. In the emergency room, the patient was noted to have a massive left lower extremity DVT with occlusion. Vascular was consulted, recommended patient will need a thrombectomy. Patient also had a CTA chest which confirmed bilateral PE, carotid bruit on the left side. There was also right lower lobe basal segment pleural blunting mass. Pulmonary was also consulted on this patient. Pulmonary saw the patient and thought that her mass is most likely infarct from her PE. Recommended no intervention for now, but patient will be followed outpatient and further plan according to that. Patient also had a CT abdomen and pelvis which confirmed extensive DVT. Vascular surgery saw the patient and agreed for the plan for thrombectomy.   The patient was taken to vascular OR and patient underwent venogram and urgent catheter suction was done for thrombectomy. The patient tolerated the procedure without any problem. Patient was observed overnight in the hospital in the ICU, did not have any problems. Patient has currently been off oxygen. She is asymptomatic. She was made to ambulate in the hallway without any problem. Her saturations remained stable even on exertion. Patient is feeling much better. Both pulmonary and vascular followed up on the patient. Both of them cleared the patient for discharge. Patient is currently hemodynamically and medically stable for discharge. The patient will be discharged home today. Vascular and pulmonary recommended Eliquis for lifelong and patient currently tolerating without any problem. Patient alert, awake and oriented x3. Discussed with the patient and also with her daughter, Mikhail Healy, over the phone about her discharge plan and followup appointment, also discussed with them about the medication side effects including risk and benefits of anticoagulation. Both patient and her daughter completely understood and agreed with the plan. I answered all their questions and concerns appropriately. I have also called Dr. Ulysses Boyd' office and left a message to his nurse about the patient has been started on anticoagulation and he will be following the patient in the next week.       Shannan Fields MD       BT/AL  D: 01/12/2018 14:00     T: 01/12/2018 18:26  JOB #: [id number]  CC: Isidro Juan MD  CC: Luis Deleon  CC: Elveria Halsted MD

## 2018-01-12 NOTE — PROGRESS NOTES
Met with patient at bedside  DX: PE/DVT  A&O X 3  Lives alone in a TH, lives downstairs, 1 slap to step up at entrance  + family live near by, multiple children/grandchildren  IADL's  Works FT in a [employer] [employer] [employer] X 38 years  + drive/+ car  + ride home at [facility]  DME: none  Meds ok: Gabe Mcdaniels  PCP: Jack Hopes  No needs identified at this time  CM will continue to follow with IDT to assist with DC needs identified

## 2018-01-12 NOTE — PROGRESS NOTES
Problem: Falls - Risk of  Goal: *Absence of Falls  Document Tiffany Fall Risk and appropriate interventions in the flowsheet.    Outcome: Progressing Towards Goal  Fall Risk Interventions:            Medication Interventions: Patient to call before getting OOB

## 2018-01-12 NOTE — DISCHARGE INSTRUCTIONS
Discharge Instructions    Patient: Carissa Barclay MRN: [id number]  CSN: [id number]    YOB: 1945  Age: 71 y.o. Sex: female    DOA: 1/9/2018 LOS:  LOS: 3 days   Discharge Date:      DIET:  Cardiac Diet    ACTIVITY: Activity as tolerated    ADDITIONAL INFORMATION: If you experience any of the following symptoms but not limited to Fever, chills, nausea, vomiting, diarrhea, change in mentation, falling, bleeding, shortness of breath, chest pain, please call your primary care physician or return to the emergency room if you cannot get hold of your doctor:     FOLLOW UP CARE:  Dr. Deion Otero MD in 7-10 days. Please call and set up an appointment.   Dr. Steve Stanford in 2 week  Dr. Raul Sanches in 2 week      Shira Rich MD  1/12/2018 1:52 PM

## 2018-01-13 LAB
INTERPRETATION, 117893: NORMAL
PS IGA SER-ACNC: 1 APS IGA (ref 0–20)
PS IGG SER-ACNC: 2 GPS IGG (ref 0–11)
PS IGM SER-ACNC: 9 MPS IGM (ref 0–25)
SCREEN APTT: 37.2 SEC (ref 0–51.9)
SCREEN DRVVT: 38.8 SEC (ref 0–47)

## 2018-01-15 ENCOUNTER — TELEPHONE (OUTPATIENT)
Dept: CASE MANAGEMENT | Age: 73
End: 2018-01-15

## 2018-01-15 LAB
ADDITIONAL INFORMATION 480297: ABNORMAL
COMMENT, 511217: NORMAL
F2 GENE MUT ANL BLD/T: ABNORMAL
F5 GENE MUT ANL BLD/T: NORMAL

## 2018-01-15 NOTE — TELEPHONE ENCOUNTER
Spoke with Ms. Claudia Jennings, states she is doing very well. She was able to fill her prescriptions, and she is going to her PCP tomorrow for her follow up. No further questions or concerns at this time.

## 2018-01-24 ENCOUNTER — OFFICE VISIT (OUTPATIENT)
Dept: VASCULAR SURGERY | Age: 73
End: 2018-01-24

## 2018-01-24 VITALS
HEIGHT: 71 IN | WEIGHT: 139 LBS | HEART RATE: 86 BPM | BODY MASS INDEX: 19.46 KG/M2 | SYSTOLIC BLOOD PRESSURE: 126 MMHG | DIASTOLIC BLOOD PRESSURE: 70 MMHG

## 2018-01-24 DIAGNOSIS — I87.2 CHRONIC VENOUS INSUFFICIENCY: ICD-10-CM

## 2018-01-24 DIAGNOSIS — I26.99 OTHER ACUTE PULMONARY EMBOLISM WITHOUT ACUTE COR PULMONALE (HCC): ICD-10-CM

## 2018-01-24 DIAGNOSIS — I82.422 ACUTE DEEP VEIN THROMBOSIS (DVT) OF ILIAC VEIN OF LEFT LOWER EXTREMITY (HCC): ICD-10-CM

## 2018-01-24 DIAGNOSIS — I87.303 CHRONIC VENOUS HYPERTENSION INVOLVING BOTH SIDES: Primary | ICD-10-CM

## 2018-01-24 NOTE — LETTER
NOTIFICATION OF RETURN TO WORK  
 
1/24/2018 Ms. Dorothy Marcos [facility] [address] 
[address] [place] [address] To Whom It May Concern: 
 
Dorothy Marcos was under the care of [facility] on 1-24-18. She will return to work on 2-5-18 with no restrictions. If there are questions or concerns please have the patient contact our office. Sincerely, Rachel Brothers MD

## 2018-01-24 NOTE — MR AVS SNAPSHOT
Sahra Hardin 
 
 
 [address] [place] [facility] 
[phone] Patient: Gilles Sacks MRN: [id number] :1945 Visit Information Date & Time Provider Department Dept. Phone Encounter #  
 2018 11:45 AM Binta Conley MD [facility] [phone] Follow-up Instructions Return in about 1 month (around 2018). Follow-up and Disposition History Your Appointments 2018 10:15 AM  
Follow Up with Iggy Werner MD  
[address] ([address]) Appt Note: DC [facility] 18-SCHD BY SCARLETT CVT-SEEN BY DR Mart[place] [address], [address], [address] [facility] [place] [address]  
  
    
 3/5/2018  9:00 AM  
PROCEDURE with [facility] IMAGING 1 [facility] ([address]) Appt Note: alma civ stents ascencio 1 month same day [address] [facility] [place] 050 706 [facility]  
[phone] [address] [place] [address],[address]  
  
    
 3/5/2018 10:00 AM  
PROCEDURE with [facility] IMAGING 2 [facility] ([address]) Appt Note: previous extensive dvt lle cvamacho same day 1 month 23[facility] [place] [address] 814 706 [facility]  
[phone] [address] [place] 47 [facility]  
  
    
 3/5/2018 11:30 AM  
Follow Up with Binta Conley MD  
[facility] [address]) Appt Note: 1 month follow up after same day studies with prep [address] [place] 039 706 [facility]  
[phone] 23[facility] 706 [facility] Upcoming Health Maintenance Date Due Hepatitis C Screening 1945 DTaP/Tdap/Td series (1 - Tdap) 1967 BREAST CANCER SCRN MAMMOGRAM 1996 FOBT Q 1 YEAR AGE 50-75 12/8/1996 ZOSTER VACCINE AGE 60> 10/8/2006 GLAUCOMA SCREENING Q2Y 12/8/2011 OSTEOPOROSIS SCREENING (DEXA) 12/8/2011 MEDICARE YEARLY EXAM 12/8/2011 Pneumococcal 65+ Low/Medium Risk (2 of 2 - PPSV23) 10/1/2022 Allergies as of 1/24/2018  Review Complete On: 1/24/2018 By: Bre Skinner MD  
 No Known Allergies Current Immunizations  Never Reviewed Name Date Influenza Vaccine 10/1/2017 Pneumococcal Vaccine (Unspecified Type) 10/1/2017 Not reviewed this visit You Were Diagnosed With   
  
 Codes Comments Chronic venous hypertension involving both sides    -  Primary ICD-10-CM: I87.303 ICD-9-CM: 459.30 Other acute pulmonary embolism without acute cor pulmonale (HCC)     ICD-10-CM: I26.99 
ICD-9-CM: 415.19 Acute deep vein thrombosis (DVT) of iliac vein of left lower extremity (HCC)     ICD-10-CM: U90.310 ICD-9-CM: 453.41 Chronic venous insufficiency     ICD-10-CM: I87.2 ICD-9-CM: 459.81 Vitals BP Pulse Height(growth percentile) Weight(growth percentile) BMI Smoking Status 126/70 (BP 1 Location: Right arm, BP Patient Position: Sitting) 86 5' 11\" (1.803 m) 139 lb (63 kg) 19.39 kg/m2 Never Smoker BMI and BSA Data Body Mass Index Body Surface Area  
 19.39 kg/m 2 1.78 m 2 Your Updated Medication List  
  
   
This list is accurate as of: 1/24/18  1:01 PM.  Always use your most recent med list. amLODIPine 5 mg tablet Commonly known as:  [place] Free Take 5 mg by mouth daily. * apixaban 5 mg tablet Commonly known as:  Helder Becket Take 2 Tabs by mouth two (2) times a day. Indications: pulmonary thromboembolism * apixaban 5 mg tablet Commonly known as:  Helder Becket Take 1 Tab by mouth two (2) times a day. Indications: pulmonary thromboembolism ENSURE ACTIVE PROTEIN-MUSCLE PO Take  by mouth. oxyCODONE-acetaminophen 5-325 mg per tablet Commonly known as:  PERCOCET  
 Take 1 Tab by mouth every four (4) hours as needed. Max Daily Amount: 6 Tabs. VITAMIN D2 50,000 unit capsule Generic drug:  ergocalciferol Take 50,000 Units by mouth every seven (7) days. * Notice: This list has 2 medication(s) that are the same as other medications prescribed for you. Read the directions carefully, and ask your doctor or other care provider to review them with you. Follow-up Instructions Return in about 1 month (around 2/24/2018). To-Do List   
 02/24/2018 Imaging:  DUPLEX IVC COMPLETE AMB   
  
 02/24/2018 Imaging:  DUPLEX LOWER EXT VENOUS LEFT AMB Introducing [facility]! Yuko Hatch introduces [employer] patient portal. Now you can access parts of your medical record, email your doctor's office, and request medication refills online. 1. In your internet browser, go to [email or web address] 2. Click on the First Time User? Click Here link in the Sign In box. You will see the New Member Sign Up page. 3. Enter your [employer] Access Code exactly as it appears below. You will not need to use this code after youve completed the sign-up process. If you do not sign up before the expiration date, you must request a new code. · [employer] Access Code: [email or web address] Expires: 4/12/2018  3:15 PM 
 
4. Enter the last four digits of your Social Security Number (xxxx) and Date of Birth (mm/dd/yyyy) as indicated and click Submit. You will be taken to the next sign-up page. 5. Create a Att[employer] Livet ID. This will be your [employer] login ID and cannot be changed, so think of one that is secure and easy to remember. 6. Create a [employer] password. You can change your password at any time. 7. Enter your Password Reset Question and Answer. This can be used at a later time if you forget your password. 8. Enter your e-mail address. You will receive e-mail notification when new information is available in [address] 9. Click Sign Up. You can now view and download portions of your medical record. 10. Click the Download Summary menu link to download a portable copy of your medical information. If you have questions, please visit the Frequently Asked Questions section of the [employer] website. Remember, [employer] is NOT to be used for urgent needs. For medical emergencies, dial 911. Now available from your iPhone and Android! Please provide this summary of care documentation to your next provider. Your primary care clinician is listed as Jazmin James. If you have any questions after today's visit, please call [phone].

## 2018-01-24 NOTE — PROGRESS NOTES
Benji Echavarria    Chief Complaint   Patient presents with    Blood Clot       History and Physical    Benji Echavarria is a 71 y.o. female who is now status post thrombectomy of extensive DVT of the left lower extremity as well as venography of the right lower extremity for chronic venous insufficiency and previous history of DVTs. 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 She was also found to have extensive DVT of the left lower extremity and after removal with mechanical thrombectomy and stenting had very good result. Currently all of her edema and pain has completely resolved bilaterally. All previous venous claudication has fully resolved bilaterally. She currently is very appreciative of her care and thankful for everything that is been performed. No fevers or chills or shortness of breath at this current time. Past Medical History:   Diagnosis Date    Essential hypertension, benign     Nonspecific abnormal electrocardiogram (ECG) (EKG)     Undiagnosed cardiac murmurs     Unspecified congenital defect of septal closure      Past Surgical History:   Procedure Laterality Date    VASCULAR SURGERY PROCEDURE UNLIST       Patient Active Problem List   Diagnosis Code    Pulmonary emboli ([facility]) I26.99    Right lower lobe lung mass R91.8    Acute deep vein thrombosis (DVT) of left lower extremity ([facility]) I82.402    Acute deep vein thrombosis (DVT) of iliac vein of left lower extremity ([facility]) I82.422    Pulmonary embolus ([facility]) I26.99    Chronic venous hypertension involving both sides I87.303    Chronic venous insufficiency I87.2     Current Outpatient Prescriptions   Medication Sig Dispense Refill    LACTOSE-REDUCED FOOD (ENSURE ACTIVE PROTEIN-MUSCLE PO) Take  by mouth.  apixaban (ELIQUIS) 5 mg tablet Take 1 Tab by mouth two (2) times a day. Indications: pulmonary thromboembolism 60 Tab 0    apixaban (ELIQUIS) 5 mg tablet Take 2 Tabs by mouth two (2) times a day. Indications: pulmonary thromboembolism 13 Tab 0    oxyCODONE-acetaminophen (PERCOCET) 5-325 mg per tablet Take 1 Tab by mouth every four (4) hours as needed. Max Daily Amount: 6 Tabs. 10 Tab 0    amLODIPine (NORVASC) 5 mg tablet Take 5 mg by mouth daily.  ergocalciferol (VITAMIN D2) 50,000 unit capsule Take 50,000 Units by mouth every seven (7) days. No Known Allergies  Social History     Social History    Marital status:      Spouse name: N/A    Number of children: N/A    Years of education: N/A     Occupational History    Not on file. Social History Main Topics    Smoking status: Never Smoker    Smokeless tobacco: Never Used    Alcohol use No    Drug use: Not on file    Sexual activity: Not on file     Other Topics Concern    Not on file     Social History Narrative      History reviewed. No pertinent family history. Physical Exam:    Visit Vitals    /70 (BP 1 Location: Right arm, BP Patient Position: Sitting)    Pulse 86    Ht 5' 11\" (1.803 m)    Wt 139 lb (63 kg)    BMI 19.39 kg/m2      General: Well-appearing female in no acute distress  HEENT: EOMI no scleral icterus is noted  Pulmonary: No increased work of breathing is noted  Extremities: Warm and well-perfused bilaterally patient has no edema bilateral lower extremities small amounts of varicose veins of bilateral lower extremities no ulcerations are identified and no skin changes are identified she does have a mild ecchymosis within the right popliteal fossa which seems to be healing quite well  Neuro: Cranial nerves II through XII grossly intact    Impression and Plan:  Keturah Basurto is a 71 y.o. 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 She has bilateral common iliac vein stents.   I will bring her back in 1 month's time with ultrasounds of bilateral common iliac veins as well as a new baseline imaging of her left lower extremity. I have written her for compression socks 20-30 mmHg below the knee bilaterally. I have also cleared her to go back to work February 5 with no restrictions. We talked about the need and use of her compression socks as well as continuing her anticoagulation. She has had multiple DVTs bilaterally in the past now that she has been treated with stenting hopefully all of these DVTs. But she is likely to require lifetime anticoagulation given the amount of DVTs that she is occurred. At least one known on the right and what appears to be 3 on the left. I will see her back in 1 month's time with her ultrasounds with further plan at that time. Although more than likely will bring her back in 5 months for six-month visit. We reviewed the plan with the patient and the patient understands. We also gave the patient appropriate instructions on their disease process and when to call back. Follow-up Disposition:  Return in about 1 month (around 2/24/2018). Jona Crisostomo MD    PLEASE NOTE:  This document has been produced using voice recognition software. Unrecognized errors in transcription may be present.

## 2018-01-26 ENCOUNTER — OFFICE VISIT (OUTPATIENT)
Dept: PULMONOLOGY | Age: 73
End: 2018-01-26

## 2018-01-26 VITALS
RESPIRATION RATE: 18 BRPM | DIASTOLIC BLOOD PRESSURE: 74 MMHG | BODY MASS INDEX: 18.98 KG/M2 | TEMPERATURE: 97.8 F | HEIGHT: 71 IN | SYSTOLIC BLOOD PRESSURE: 140 MMHG | OXYGEN SATURATION: 99 % | HEART RATE: 83 BPM | WEIGHT: 135.6 LBS

## 2018-01-26 DIAGNOSIS — I26.99 OTHER ACUTE PULMONARY EMBOLISM WITHOUT ACUTE COR PULMONALE (HCC): ICD-10-CM

## 2018-01-26 DIAGNOSIS — I26.99 INFARCT OF LUNG (HCC): ICD-10-CM

## 2018-01-26 DIAGNOSIS — I82.422 ACUTE DEEP VEIN THROMBOSIS (DVT) OF ILIAC VEIN OF LEFT LOWER EXTREMITY (HCC): ICD-10-CM

## 2018-01-26 DIAGNOSIS — R91.8 RIGHT LOWER LOBE LUNG MASS: Primary | ICD-10-CM

## 2018-01-26 NOTE — PROGRESS NOTES
Chief Complaint   Patient presents with   [facility] Follow Up     pulmonary embolism     Patient her for routine follow up visit.

## 2018-01-26 NOTE — MR AVS SNAPSHOT
[address] [facility], [address] [facility] [address] [address] 
[phone] Patient: Justin Castro MRN: [id number] :1945 Visit Information Date & Time Provider Department Dept. Phone Encounter #  
 2018 10:15 AM MD [facility] Ana Lopez [id number] Follow-up Instructions Return in about 2 months (around 3/26/2018). Your Appointments 3/5/2018  9:00 AM  
PROCEDURE with B[facility]VS IMAGING 1 [facility] ([facility]) Appt Note: alma civ stents ascencio 1 month same day 2300 [facility] [place] [address] 706 [facility]  
[phone] [address] [place] [address],[address]  
  
    
 3/5/2018 10:00 AM  
PROCEDURE with [facility]VS IMAGING 2 [facility] ([facility]) Appt Note: previous extensive dvt lle cvamacho same day 1 month 2300 [facility] [place] [address] 706 [facility]  
[phone] 23[address] [facility] [place] 47 [facility]  
  
    
 3/5/2018 11:30 AM  
Follow Up with Sanya Wall MD  
[facility]) Appt Note: 1 month follow up after same day studies with prep 2300 [facility] [place] [address] 706 [facility]  
[phone] 2300 [facility] 706 [facility] Upcoming Health Maintenance Date Due Hepatitis C Screening 1945 DTaP/Tdap/Td series (1 - Tdap) 1967 BREAST CANCER SCRN MAMMOGRAM 1996 FOBT Q 1 YEAR AGE 50-75 1996 ZOSTER VACCINE AGE 60> 10/8/2006 GLAUCOMA SCREENING Q2Y 2011 OSTEOPOROSIS SCREENING (DEXA) 2011 MEDICARE YEARLY EXAM 2011 Pneumococcal 65+ Low/Medium Risk (2 of 2 - PPSV23) 10/1/2022 Allergies as of 2018  Review Complete On: 2018 By: Isaiah Toledo LPN No Known Allergies Current Immunizations  Never Reviewed Name Date Influenza Vaccine 10/1/2017 Pneumococcal Vaccine (Unspecified Type) 10/1/2017 Not reviewed this visit You Were Diagnosed With   
  
 Codes Comments Right lower lobe lung mass    -  Primary ICD-10-CM: R91.8 ICD-9-CM: 362. 6 Infarct of lung ([facility] [address]     ICD-10-CM: I26.99 
ICD-9-CM: 415.19 Other acute pulmonary embolism without acute cor pulmonale (HCC)     ICD-10-CM: I26.99 
ICD-9-CM: 415.19 Acute deep vein thrombosis (DVT) of iliac vein of left lower extremity (HCC)     ICD-10-CM: Z78.044 ICD-9-CM: 453.41 Vitals BP Pulse Temp Resp Height(growth percentile) Weight(growth percentile) 140/74 (BP 1 Location: Left arm, BP Patient Position: Sitting) 83 97.8 °F (36.6 °C) (Oral) 18 5' 11\" (1.803 m) 135 lb 9.6 oz (61.5 kg) SpO2 BMI Smoking Status 99% 18.91 kg/m2 Never Smoker BMI and BSA Data Body Mass Index Body Surface Area  
 18.91 kg/m 2 1.76 m 2 Your Updated Medication List  
  
   
This list is accurate as of: 1/26/18 10:48 AM.  Always use your most recent med list. amLODIPine 5 mg tablet Commonly known as:  Dinah Emmanuel Take 5 mg by mouth daily. * apixaban 5 mg tablet Commonly known as:  Vicente Lamine Take 2 Tabs by mouth two (2) times a day. Indications: pulmonary thromboembolism * apixaban 5 mg tablet Commonly known as:  Vicente Lamine Take 1 Tab by mouth two (2) times a day. Indications: pulmonary thromboembolism ENSURE ACTIVE PROTEIN-MUSCLE PO Take  by mouth. oxyCODONE-acetaminophen 5-325 mg per tablet Commonly known as:  PERCOCET Take 1 Tab by mouth every four (4) hours as needed. Max Daily Amount: 6 Tabs. VITAMIN D2 50,000 unit capsule Generic drug:  ergocalciferol Take 50,000 Units by mouth every seven (7) days. * Notice:   This list has 2 medication(s) that are the same as other medications prescribed for you. Read the directions carefully, and ask your doctor or other care provider to review them with you. Follow-up Instructions Return in about 2 months (around 3/26/2018). To-Do List   
 03/13/2018 Imaging:  CT CHEST W WO CONT Introducing \[facility]\"" & [facility]! Bita Timothy introduces "[employer] patient portal. Now you can access parts of your medical record, email your doctor's office, and request medication refills online. 1. In your internet browser, go to [email or web address] ([email or web address])/Ask The Doctor 2. Click on the First Time User? Click Here link in the Sign In box. You will see the New Member Sign Up page. 3. Enter your "[employer] Access Code exactly as it appears below. You will not need to use this code after youve completed the sign-up process. If you do not sign up before the expiration date, you must request a new code. · "[employer] Access Code: [email or web address] Expires: 4/12/2018  3:15 PM 
 
4. Enter the last four digits of your Social Security Number (xxxx) and Date of Birth (mm/dd/yyyy) as indicated and click Submit. You will be taken to the next sign-up page. 5. Create a "[employer] ID. This will be your "[employer] login ID and cannot be changed, so think of one that is secure and easy to remember. 6. Create a "[employer] password. You can change your password at any time. 7. Enter your Password Reset Question and Answer. This can be used at a later time if you forget your password. 8. Enter your e-mail address. You will receive e-mail notification when new information is available in [address] 9. Click Sign Up. You can now view and download portions of your medical record. 10. Click the Download Summary menu link to download a portable copy of your medical information. If you have questions, please visit the Frequently Asked Questions section of the "[employer] website.  Remember, "[employer] is NOT to be used for urgent needs. For medical emergencies, dial 911. Now available from your iPhone and Android! Please provide this summary of care documentation to your next provider. Your primary care clinician is listed as Irish Glez. If you have any questions after today's visit, please call [phone].

## 2018-01-26 NOTE — LETTER
1/26/2018 5:06 PM 
 
Patient:  Patrick Peters YOB: 1945 Date of Visit: 1/26/2018 Dear Irish Glez MD 
[address] [place] [address] VIA Facsimile: [phone] 
 : 
 
 
Thank you for referring Ms. Beverly Moulton to me for evaluation/treatment. Below are the relevant portions of my assessment and plan of care. If you have questions, please do not hesitate to call me. I look forward to following Ms. Lauren Magana along with you. Sincerely, Sameera Henderson MD/MPH Pulmonary, Critical Care Medicine [facility]

## 2018-01-26 NOTE — PROGRESS NOTES
[address] [place] [address], [address] [facility]  Pulmonary, Critical Care, and Sleep Medicine    Pulmonary Followup  Name: Katie Carreno 71 y.o. female  MRN: [id number]  : 1945  Service Date: 18  Chief Complaint:   Chief Complaint   Patient presents with   [facility] Follow Up     pulmonary embolism         History of Present Illness:  Katie Carreno is a 71 y.o. female, who presents to Pulmonary clinic for followup of VTE-PE and DVT. Pt was admitted to [facility] from -18 for three day hx of LLE pain and swelling. Pt denied dyspnea, pleurtic chest pain, and hemoptysis at the time. She was found to have LLE DVT, was seen by Vascular Surgery and underwent thrombolysis. Pt was discharged on Eliquis which she has been compliant with. Of note, this is the third time she has had a DVT, two prior times she was on warfarin. Pt saw Dr. Ricardo Mendoza yesterday, plans for followup in March. Pt denies any respiratory complaints, denies dyspnea, cough, hemoptysis, night sweats, N/V/D, URI    Occ Hx:  Pt works in a [employer] and [address] [place] [address] Hx:  Pt is a lifelong nonsmoker    Allergies: I have reviewed the allergy hx  No Known Allergies    Medications:  I have reviewed the patient's medications  Prior to Admission medications    Medication Sig Start Date End Date Taking? Authorizing Provider   LACTOSE-REDUCED FOOD (ENSURE ACTIVE PROTEIN-MUSCLE PO) Take  by mouth. Yes Historical Provider   apixaban (ELIQUIS) 5 mg tablet Take 1 Tab by mouth two (2) times a day. Indications: pulmonary thromboembolism 18  Yes Ray Byrne MD   apixaban (ELIQUIS) 5 mg tablet Take 2 Tabs by mouth two (2) times a day. Indications: pulmonary thromboembolism 18  Yes Ray Byrne MD   amLODIPine (NORVASC) 5 mg tablet Take 5 mg by mouth daily.    Yes Historical Provider   ergocalciferol (VITAMIN D2) 50,000 unit capsule Take 50,000 Units by mouth every seven (7) days. Yes Historical Provider   oxyCODONE-acetaminophen (PERCOCET) 5-325 mg per tablet Take 1 Tab by mouth every four (4) hours as needed. Max Daily Amount: 6 Tabs. 1/12/18   Yesica Salinas MD       Immunizations:  I have reviewed the patient's immunizations  Immunization History   Administered Date(s) Administered    Influenza Vaccine 10/01/2017    Pneumococcal Vaccine (Unspecified Type) 10/01/2017       Review of Systems:  A complete review of systems was performed as stated in the HPI, all others are negative. Objective:    Physical Exam:  There were no vitals taken for this visit. Vitals were personally reviewed  Gen: no acute distress, pleasant and cooperative, sitting up in chair, able to climb to exam table w/o difficulty  HEENT: normocephalic/atraumatic, PERRLA, EOMI, no scleral icterus, nasal turbinates have no erythema, no nasal polyps, no oral lesions, good dentition, Mallampati II  Neck: supple, trachea midline, no JVD, no cervical and supraclavicular adenopathy  CVS: regular rate rhythm, S1/S2, no murmurs/rubs/gallops  Lungs: good air entry B/L, no wheezes/rales/rhonchi  Abdomen: soft, nontender, bowel sounds present, no hepatosplenomegaly  Ext: no pitting edema B/L, no peripheral cyanosis or clubbing  Neuro: grossly normal, AAOx3, normal strength and coordination grossly, no focal deficits  Skin: some ecchymosis along posterior right knee, no other rashes, erythema, lesions  Psych: normal memory, thought content, and processing    Labs:   I have reviewed the patient's available labs    Imaging:  I have personally reviewed patient's imaging as follows -- CTA from 1/9/18 shows B/L PE small, with RLL lesion abutting the pleura with feeding vessel -- no mediastinal/hilar lymphadenopathy  Official Report per Radiology:  Dom Vaughan MD   Tue Jan 9, 2018  1:57:53 PM EST         Addendum: Called E.D. and informed Dr. Elizabeth Mckeon with findings regarding PE and  right lower lobe mass at 13:57.       Study Result      PROCEDURE:  CTA Chest with Contrast (CT Pulmonary Study for PE).     INDICATION:  Chest pain. Diagnosed with DVT in the left leg today.      COMPARISON:  None.      TECHNIQUE:  Helical volumetric 2.5 mm sections of the chest are obtained with CT  pulmonary angiogram protocol. Subsequently, sagittal and coronal multiplanar  reconstruction images are obtained. Maximum intensity projection images are  generated to better delineate the pulmonary vasculature, differentiate between  the pulmonary arteries and veins and to increase sensitivity to pulmonary  emboli.       - IV contrast:  100 mL Isovue-370.  - Radiation dose:   mGy-cm.  - Note:  Radiation dose optimization techniques are utilized as appropriate to  the exam, with combination of automated exposure control, adjustment of the mA  and/or kV according to patient's size (Including appropriate matching for  site-specific examinations), or use of iterative reconstruction technique.     FINDINGS:      Pulmonary Arteries:       - The pulmonary artery opacification is diagnostic in quality.  - In the left interlobar artery, there is a focal filling defect with the bulk  of filling defect extending into the left lower lobar intersegmental artery and  extending into anterior and middle basal segmental arteries. A much more subtle  filling defect is suggested in the right lower lobe internal segmental artery  extending into the medial basal and posterior basal segmental arteries.     Lung, Pleura, Airways:       - 3.2 x 1.5 cm bilobed pleura-abutting mass in the right lower lobe anterior  basal segment (axial #47-54). - Possible peripheral scarring in the right upper lobe with curvilinear bandlike  density (axial #16).   - In the left lower lobe, a branching density is identified measuring about 1.4  x 0.5 cm (axial #38-39, coronal #28), possibly representing mucus impaction in  peripheral subsegmental bronchial branch. - Tiny left pleural effusion.     Mediastinum, Michelle:  No adenopathy.     Aorta:  No evidence of aortic dissection or aneurysm.     Base of neck:  No acute findings.     Axillae:  Unremarkable.      Abdomen:  No acute findings.     Skeletal Structures:  No acute findings.      IMPRESSION  IMPRESSION:      1. Bilateral pulmonary embolism. Greater burden on the left side.     2. Right lower lobe anterior basal segment peripheral pleura-abutting mass. Recommend PET-CT for further delineation.     3. Branching peripheral tubular density in the left lower lobe, probably a  focus of mucus impaction. TTE:  I have reviewed the patient's TTE results  No results found for this or any previous visit. Assessment and Plan:  71 y.o. female with:    Impression:  1. 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 No evidence of RV strain from PE  2. RLL opacity - pulmonary infarct vs mass. Pt asymptomatic -- likely infarct      Plan:  -Discussed and showed CT results with patient noting to her that it was read as a mass, but appears more consistent with pulmonary infarction. Repeat CT chest in mid March -- advised that she needs repeat imaging to rule out malignancy as a biopsy is contraindicated while on anticoagulation  -Discussed with the patient that she will need lifelong anticoagulation unless contraindication occurs.  -Counseled her on lifestyle precautions while being on anticoagulation avoid closed head injury and other body injury  -Continue Eliquis BID  -Management of vein vascular web per Vascular Surgery - Dr. Myriam Osgood patient to followup with PCP regarding yearly mammogram and colonoscopy screenings      RTC: Follow-up Disposition:  Return in about 2 months (around 3/26/2018).       Orders Placed This Encounter    CT CHEST W WO BIENVENIDO Servin MD/MPH     Pulmonary, Critical Care Medicine  [facility]  01/26/18  10:30 AM

## 2018-02-07 ENCOUNTER — DOCUMENTATION ONLY (OUTPATIENT)
Dept: VASCULAR SURGERY | Age: 73
End: 2018-02-07

## 2018-02-07 NOTE — PROGRESS NOTES
This is a note documenting a disclosure meeting with the patient on 1/12/18. At this meeting were myself, Dr. Roberto Deng, Rishi Marivel Martin, and Mr. Melissa Brown. During the meeting I discussed with the patient that prior to the procedure we had talked about performing a bilateral procedure to help her with her extensive DVT of left leg as well as chronic venous insufficiency that she has had bilaterally and previous clots of bilateral legs. She agreed to this procedure but I did let her know that her consent form was not changed to a bilateral procedure and was kept as a left lower extremity venogram.  She stated that she was glad that I performed the bilateral procedure like we had talked about and felt great. She was excited about getting ready to leave the hospital and stated that she has had wonderful care at the hospital and didn't know why everyone said [facility] was a bad hospital.  She was very appreciative of the care and started discussing how good the food was with Mrs. Coleman and . Melissa Brown. At this point I left the room.

## 2018-03-05 ENCOUNTER — OFFICE VISIT (OUTPATIENT)
Dept: VASCULAR SURGERY | Age: 73
End: 2018-03-05

## 2018-03-05 VITALS
RESPIRATION RATE: 17 BRPM | WEIGHT: 135 LBS | DIASTOLIC BLOOD PRESSURE: 80 MMHG | HEIGHT: 71 IN | SYSTOLIC BLOOD PRESSURE: 136 MMHG | HEART RATE: 72 BPM | BODY MASS INDEX: 18.9 KG/M2

## 2018-03-05 DIAGNOSIS — I82.422 ACUTE DEEP VEIN THROMBOSIS (DVT) OF ILIAC VEIN OF LEFT LOWER EXTREMITY (HCC): ICD-10-CM

## 2018-03-05 DIAGNOSIS — I82.403 DEEP VEIN THROMBOSIS (DVT) OF BOTH LOWER EXTREMITIES, UNSPECIFIED CHRONICITY, UNSPECIFIED VEIN (HCC): Primary | ICD-10-CM

## 2018-03-05 DIAGNOSIS — R91.8 RIGHT LOWER LOBE LUNG MASS: ICD-10-CM

## 2018-03-05 DIAGNOSIS — Z95.820 S/P ANGIOPLASTY WITH STENT: ICD-10-CM

## 2018-03-05 DIAGNOSIS — I26.99 OTHER ACUTE PULMONARY EMBOLISM WITHOUT ACUTE COR PULMONALE (HCC): ICD-10-CM

## 2018-03-05 NOTE — MR AVS SNAPSHOT
[address] [address] [address] [facility] 
[phone] Patient: Benita Hinkle MRN: [id number] :1945 Visit Information Date & Time Provider Department Dept. Phone Encounter #  
 3/5/2018 11:30 AM Litzy Gil, [facility]  Follow-up Instructions Return in about 5 months (around 2018). Follow-up and Disposition History Upcoming Health Maintenance Date Due Hepatitis C Screening 1945 DTaP/Tdap/Td series (1 - Tdap) 1967 BREAST CANCER SCRN MAMMOGRAM 1996 FOBT Q 1 YEAR AGE 50-75 1996 ZOSTER VACCINE AGE 60> 10/8/2006 GLAUCOMA SCREENING Q2Y 2011 OSTEOPOROSIS SCREENING (DEXA) 2011 MEDICARE YEARLY EXAM 2011 Pneumococcal 65+ Low/Medium Risk (2 of 2 - PPSV23) 10/1/2022 Allergies as of 3/5/2018  Review Complete On: 3/5/2018 By: Litzy Gil MD  
 No Known Allergies Current Immunizations  Never Reviewed Name Date Influenza Vaccine 10/1/2017 Pneumococcal Vaccine (Unspecified Type) 10/1/2017 Not reviewed this visit You Were Diagnosed With   
  
 Codes Comments Other acute pulmonary embolism without acute cor pulmonale (HCC)     ICD-10-CM: I26.99 
ICD-9-CM: 415.19 Right lower lobe lung mass     ICD-10-CM: R91.8 ICD-9-CM: 266. 6 Acute deep vein thrombosis (DVT) of iliac vein of left lower extremity (HCC)     ICD-10-CM: Q92.738 ICD-9-CM: 453.41 Vitals BP Pulse Resp Height(growth percentile) Weight(growth percentile) BMI  
 136/80 (BP 1 Location: Left arm, BP Patient Position: Sitting) 72 17 5' 11\" (1.803 m) 135 lb (61.2 kg) 18.83 kg/m2 Smoking Status Never Smoker Vitals History BMI and BSA Data Body Mass Index Body Surface Area  
 18.83 kg/m 2 1.75 m 2 Your Updated Medication List  
  
   
 This list is accurate as of 3/5/18 11:50 AM.  Always use your most recent med list. amLODIPine 5 mg tablet Commonly known as:  Casie Prow Take 5 mg by mouth daily. * apixaban 5 mg tablet Commonly known as:  Larissa Quintana Take 2 Tabs by mouth two (2) times a day. Indications: pulmonary thromboembolism * apixaban 5 mg tablet Commonly known as:  Melissalitaylor Lilian Take 1 Tab by mouth two (2) times a day. Indications: pulmonary thromboembolism ENSURE ACTIVE PROTEIN-MUSCLE PO Take  by mouth. oxyCODONE-acetaminophen 5-325 mg per tablet Commonly known as:  PERCOCET Take 1 Tab by mouth every four (4) hours as needed. Max Daily Amount: 6 Tabs. VITAMIN D2 50,000 unit capsule Generic drug:  ergocalciferol Take 50,000 Units by mouth every seven (7) days. * Notice: This list has 2 medication(s) that are the same as other medications prescribed for you. Read the directions carefully, and ask your doctor or other care provider to review them with you. Follow-up Instructions Return in about 5 months (around 8/5/2018). To-Do List   
 03/30/2018 9:30 AM  
  Appointment with [facility] CT RM 2 at [facility] RAD [address] ([phone]) DIET RESTRICTIONS  Nothing to eat or drink 4 hours prior to study May have water to take meds  GENERAL INSTRUCTIONS  If you were given medications to take for a contrast allergy prior to having this study, please arrange to have someone drive you to your appointment. If you have had a creatinine level drawn within the past 30 days, please bring most recent results to your appt. This study does not require you to drink contrast prior to your study. MEDICATIONS  Bring a complete list of all medications you are currently taking to include prescriptions, over-the-counter meds, herbals, vitamins & any dietary supplements.   OUTSIDE FILMS  Bring outside films, CDs, and reports related to the study with you on the day of your exam. QUESTIONS  Notify the CT Department if you have any questions concerning your study. Farhat Jennings - [phone] [facility] [address] - [phone]  
  
 08/05/2018 Imaging:  DUPLEX IVC COMPLETE AMB Introducing [facility]! Shon Mendez introduces [employer] patient portal. Now you can access parts of your medical record, email your doctor's office, and request medication refills online. 1. In your internet browser, go to https://[employer] 2. Click on the First Time User? Click Here link in the Sign In box. You will see the New Member Sign Up page. 3. Enter your [employer] Access Code exactly as it appears below. You will not need to use this code after youve completed the sign-up process. If you do not sign up before the expiration date, you must request a new code. · [employer] Access Code: [email or web address] Expires: 4/12/2018  3:15 PM 
 
4. Enter the last four digits of your Social Security Number (xxxx) and Date of Birth (mm/dd/yyyy) as indicated and click Submit. You will be taken to the next sign-up page. 5. Create a [employer] ID. This will be your [employer] login ID and cannot be changed, so think of one that is secure and easy to remember. 6. Create a [employer] password. You can change your password at any time. 7. Enter your Password Reset Question and Answer. This can be used at a later time if you forget your password. 8. Enter your e-mail address. You will receive e-mail notification when new information is available in [address] 9. Click Sign Up. You can now view and download portions of your medical record. 10. Click the Download Summary menu link to download a portable copy of your medical information. If you have questions, please visit the Frequently Asked Questions section of the [employer] website. Remember, [employer] is NOT to be used for urgent needs. For medical emergencies, dial 911. Now available from your iPhone and Android! Please provide this summary of care documentation to your next provider. Your primary care clinician is listed as Salma Betancur. If you have any questions after today's visit, please call [phone].

## 2018-03-05 NOTE — PROGRESS NOTES
1. Have you been to an emergency room or urgent care clinic since your last visit? no    Hospitalized since your last visit? No    2. Have you seen or consulted any other health care providers outside of the [facility] since your last visit including any procedures, health maintenance items.  NO

## 2018-03-05 NOTE — PROCEDURES
[facility]  *** FINAL REPORT ***    Name: Lis Beltrán  MRN: [id number]       Outpatient  : 08 Dec 1945  HIS Order #: [id number]  [place]ae[place] Visit #: [id number]  Date: 05 Mar 2018    TYPE OF TEST: Peripheral Venous Testing    REASON FOR TEST  H/O DVT, Femoral DVT    Right Leg:-  Deep venous thrombosis:           Yes  Proximal extent of thrombus:      Common Femoral  Superficial venous thrombosis:    Yes  Deep venous insufficiency:        Not examined  Superficial venous insufficiency: Not examined    Left Leg:-  Deep venous thrombosis:           Yes  Proximal extent of thrombus:      Gastrocnemius  Superficial venous thrombosis:    Not examined  Deep venous insufficiency:        Not examined  Superficial venous insufficiency: Yes      INTERPRETATION/FINDINGS  Duplex images were obtained using 2-D gray scale, color flow and  spectral doppler analysis. Right leg :  1. Chronic echogenic deep venous thrombosis identified in the common  femoral, proximal femoral, mid femoral, distal femoral deep femoral,  popliteal, trifurcation/proximal peroneal vein. 2. Chronic, non occlusive superficial venous thrombus in the small  saphenous vein in the junction and calf segments. 3. Reflux in the small saphenous vein measuring 2.5 seconds. 4. Multiphasic tibial artery doppler signal at rest.  5. Pulsatile venous flow  noted throughout. Left leg :  1. No evidence of deep venous thrombosis detected in the common  femoral, femoral, profunda, popliteal, posterior tibial or peroneal  veins visualized. 2. Age undetermined non occlusive deep venous thrombus in 1 of 2  gastrocneumius veins. 3. Multphasic tibial artery doppler signal at rest.  4. Pulsatile venous flow noted throughout. ADDITIONAL COMMENTS    I have personally reviewed the data relevant to the interpretation of  this  study. TECHNOLOGIST: Angela Cortes RDMS  Signed: 2018 10:30 AM    PHYSICIAN: Alexsandra Zheng.  Tyrel Ng MD  Signed: 2018 02:20 PM

## 2018-03-05 NOTE — PROGRESS NOTES
Columba Michael    Chief Complaint   Patient presents with    Blood Clot     Follow uop studies       History and Physical    Columba Michael is a 71 y.o. female with extensive DVT of the left lower extremity as well as chronic venous insufficiency of the right lower extremity. Patient has been wearing her compression socks bilaterally. She states that she feels intensely better on bilateral lower extremities. Her swelling is completely resolved bilaterally and a lot of the venous claudication that she was having previously has fully resolved as well. No fevers or chills no shortness of breath. Past Medical History:   Diagnosis Date    Essential hypertension, benign     Nonspecific abnormal electrocardiogram (ECG) (EKG)     Undiagnosed cardiac murmurs     Unspecified congenital defect of septal closure      Past Surgical History:   Procedure Laterality Date    VASCULAR SURGERY PROCEDURE UNLIST       Patient Active Problem List   Diagnosis Code    Pulmonary emboli ([facility]) I26.99    Right lower lobe lung mass R91.8    Acute deep vein thrombosis (DVT) of left lower extremity ([facility]) I82.402    Acute deep vein thrombosis (DVT) of iliac vein of left lower extremity ([facility]) I82.422    Pulmonary embolus ([facility]) I26.99    Chronic venous hypertension involving both sides I87.303    Chronic venous insufficiency I87.2     Current Outpatient Prescriptions   Medication Sig Dispense Refill    LACTOSE-REDUCED FOOD (ENSURE ACTIVE PROTEIN-MUSCLE PO) Take  by mouth.  apixaban (ELIQUIS) 5 mg tablet Take 1 Tab by mouth two (2) times a day. Indications: pulmonary thromboembolism 60 Tab 0    amLODIPine (NORVASC) 5 mg tablet Take 5 mg by mouth daily.  ergocalciferol (VITAMIN D2) 50,000 unit capsule Take 50,000 Units by mouth every seven (7) days.  apixaban (ELIQUIS) 5 mg tablet Take 2 Tabs by mouth two (2) times a day.  Indications: pulmonary thromboembolism 13 Tab 0    oxyCODONE-acetaminophen (PERCOCET) 5-325 mg per tablet Take 1 Tab by mouth every four (4) hours as needed. Max Daily Amount: 6 Tabs. 10 Tab 0     No Known Allergies  Social History     Social History    Marital status:      Spouse name: N/A    Number of children: N/A    Years of education: N/A     Occupational History    Not on file. Social History Main Topics    Smoking status: Never Smoker    Smokeless tobacco: Never Used    Alcohol use No    Drug use: Not on file    Sexual activity: Not on file     Other Topics Concern    Not on file     Social History Narrative      History reviewed. No pertinent family history. Physical Exam:    Visit Vitals    /80 (BP 1 Location: Left arm, BP Patient Position: Sitting)    Pulse 72    Resp 17    Ht 5' 11\" (1.803 m)    Wt 135 lb (61.2 kg)    BMI 18.83 kg/m2      General: Well-appearing female in no acute distress  HEENT: EOMI no scleral icterus is noted  Pulmonary: No increased work of breathing is noted  Extremities: Warm and well-perfused bilaterally no edema is located bilaterally no skin changes or ulcerations are identified  Neuro: Cranial nerves II through XII grossly intact    Impression and Plan:  Robel Gonzales is a 71 y.o. female with previous class III chronic venous insufficiency and extensive DVT of the left lower extremity as well as a lot of chronic DVT of the right lower extremity. Currently she would be classified as class I chronic venous insufficiency of bilateral lower extremities she has 1 gastrocnemius vein of the left calf intramuscular clot everything else is completely resolved on the right lower extremity she does have some extensive chronic DVT but this seems to be aided with her common iliac vein stent on the right side. I reviewed her ultrasound in clinic today showing that her stents are patent bilaterally in the above amounts of DVT left.   I recommended repeat ultrasound in 5 months for six-month visit confirming that her common iliac vein stents are open and working. After that we will bring her back in another 6 months for her anniversary and then yearly thereafter. She will require lifelong anticoagulation given her previous history of multiple DVTs and PE. She is understanding that if she develops any further problems or feels the exact same discomfort that she had previously to give me a call immediately and we can move up these ultrasounds as necessary. We reviewed the plan with the patient and the patient understands. We also gave the patient appropriate instructions on their disease process and when to call back. Follow-up Disposition:  Return in about 5 months (around 8/5/2018). Vikas Silva MD    PLEASE NOTE:  This document has been produced using voice recognition software. Unrecognized errors in transcription may be present.

## 2018-03-05 NOTE — PROCEDURES
[facility]  *** FINAL REPORT ***    Name: Zoe Groves  MRN: [id number]       Outpatient  : 08 Dec 1945  HIS Order #: [id number]  [address] [facility] Visit #: [id number]  Date: 05 Mar 2018    TYPE OF TEST: Abdominal Venous Duplex    REASON FOR TEST  Iliac stent, Thrombosis, specified vein    Inferior Vena Cava: Normal  Portal vein:          Diameter:  mm  Splenic vein:  Superior mesenteric    Hepatic vein: Right                Mid                Left    Hepatic artery        PSV:  cm/s  Right renal vein:  Left renal vein:                        Right                Left   Common Iliac:       Normal               Normal  External Iliac:     Normal               Normal  Common Femoral:    INTERPRETATION/FINDINGS  Duplex images were obtained using 2-D gray scale, color flow and  spectral doppler analysis. 1. No evidence of deep venous thrombosis or stenosis in the distal  IVC, common iliac vein stents or outflow in the proximal external  iliac veins bilaterally. 2. Pulsatile flow noted. ADDITIONAL COMMENTS    I have personally reviewed the data relevant to the interpretation of  this  study. TECHNOLOGIST: Lisa Alexandra RDMS  Signed: 2018 10:22 AM    PHYSICIAN: Willow Diamond.  Maura Abreu MD  Signed: 2018 02:20 PM

## 2018-03-30 ENCOUNTER — HOSPITAL ENCOUNTER (OUTPATIENT)
Dept: CT IMAGING | Age: 73
Discharge: HOME | End: 2018-03-30
Attending: INTERNAL MEDICINE
Payer: COMMERCIAL

## 2018-03-30 DIAGNOSIS — R91.8 RIGHT LOWER LOBE LUNG MASS: ICD-10-CM

## 2018-03-30 DIAGNOSIS — I82.422 ACUTE DEEP VEIN THROMBOSIS (DVT) OF ILIAC VEIN OF LEFT LOWER EXTREMITY (HCC): ICD-10-CM

## 2018-03-30 DIAGNOSIS — I26.99 OTHER ACUTE PULMONARY EMBOLISM WITHOUT ACUTE COR PULMONALE (HCC): ICD-10-CM

## 2018-03-30 DIAGNOSIS — I26.99 INFARCT OF LUNG (HCC): ICD-10-CM

## 2018-03-30 PROCEDURE — 74011636320 HC RX REV CODE- 636/320: Performed by: INTERNAL MEDICINE

## 2018-03-30 PROCEDURE — 71260 CT THORAX DX C+: CPT

## 2018-03-30 RX ADMIN — IOPAMIDOL 75 ML: 612 INJECTION, SOLUTION INTRAVENOUS at 09:47

## 2018-06-28 ENCOUNTER — OFFICE VISIT (OUTPATIENT)
Dept: PULMONOLOGY | Age: 73
End: 2018-06-28

## 2018-06-28 VITALS
HEART RATE: 76 BPM | OXYGEN SATURATION: 96 % | DIASTOLIC BLOOD PRESSURE: 70 MMHG | SYSTOLIC BLOOD PRESSURE: 130 MMHG | RESPIRATION RATE: 18 BRPM | TEMPERATURE: 98.3 F | WEIGHT: 136.75 LBS | BODY MASS INDEX: 18.52 KG/M2 | HEIGHT: 72 IN

## 2018-06-28 DIAGNOSIS — R91.8 RIGHT LOWER LOBE LUNG MASS: ICD-10-CM

## 2018-06-28 DIAGNOSIS — I26.99 OTHER ACUTE PULMONARY EMBOLISM WITHOUT ACUTE COR PULMONALE (HCC): Primary | ICD-10-CM

## 2018-06-28 DIAGNOSIS — I87.2 CHRONIC VENOUS INSUFFICIENCY: ICD-10-CM

## 2018-06-28 NOTE — MR AVS SNAPSHOT
[address] [facility], [address] [facility] [address] 
[phone] Patient: Colleen Chen MRN: [id number] :1945 Visit Information Date & Time Provider Department Dept. Phone Encounter #  
 2018  3:30 PM Alonso May MD [facility] Ana Lopez [id number] Follow-up Instructions Return in about 5 months (around 2018). Your Appointments 2018  9:00 AM  
PROCEDURE with [facility] IMAGING 2 [facility] ([facility]) Appt Note: IVC CIV STENTS POWERS 5 MONTHS SAME DAY  
 Manisha Marquez 018 [address] [facility] [address]  
[phone] [address] [facility]  
  
    
 2018 11:45 AM  
Follow Up with Darrin Kirkland MD  
[address] [facility]) Appt Note: 5 MONTH SAME DAY WITH PREP  
 Manisha Marquez 017 [address] [facility] [address]  
[phone] [facility] [place] [address] [facility] [address] Upcoming Health Maintenance Date Due Hepatitis C Screening 1945 DTaP/Tdap/Td series (1 - Tdap) 1967 BREAST CANCER SCRN MAMMOGRAM 1996 FOBT Q 1 YEAR AGE 50-75 1996 ZOSTER VACCINE AGE 60> 10/8/2006 GLAUCOMA SCREENING Q2Y 2011 Bone Densitometry (Dexa) Screening 2011 MEDICARE YEARLY EXAM 3/28/2018 Influenza Age 5 to Adult 2018 Pneumococcal 65+ Low/Medium Risk (2 of 2 - PPSV23) 10/1/2022 Allergies as of 2018  Review Complete On: 2018 By: Jovanni Peters LPN No Known Allergies Current Immunizations  Never Reviewed Name Date Influenza Vaccine 10/1/2017 Pneumococcal Vaccine (Unspecified Type) 10/1/2017 Not reviewed this visit You Were Diagnosed With   
  
 Codes Comments  Other acute pulmonary embolism without acute cor pulmonale (HCC)    - Primary ICD-10-CM: I26.99 
ICD-9-CM: 415.19 Right lower lobe lung mass     ICD-10-CM: R91.8 ICD-9-CM: 935. 6 Chronic venous insufficiency     ICD-10-CM: I87.2 ICD-9-CM: 459.81 Vitals BP Pulse Temp Resp Height(growth percentile) Weight(growth percentile) 130/70 (BP 1 Location: Left arm, BP Patient Position: Sitting) 76 98.3 °F (36.8 °C) 18 6' (1.829 m) 136 lb 12 oz (62 kg) SpO2 BMI Smoking Status 96% 18.55 kg/m2 Never Smoker Vitals History BMI and BSA Data Body Mass Index Body Surface Area 18.55 kg/m 2 1.77 m 2 Your Updated Medication List  
  
   
This list is accurate as of 6/28/18  4:37 PM.  Always use your most recent med list. amLODIPine 5 mg tablet Commonly known as:  Suzon Salle Take 5 mg by mouth daily. * apixaban 5 mg tablet Commonly known as:  Marjorie Haver Take 2 Tabs by mouth two (2) times a day. Indications: pulmonary thromboembolism * apixaban 5 mg tablet Commonly known as:  Marjorie Haver Take 1 Tab by mouth two (2) times a day. Indications: pulmonary thromboembolism ENSURE ACTIVE PROTEIN-MUSCLE PO Take  by mouth. oxyCODONE-acetaminophen 5-325 mg per tablet Commonly known as:  PERCOCET Take 1 Tab by mouth every four (4) hours as needed. Max Daily Amount: 6 Tabs. VITAMIN D2 50,000 unit capsule Generic drug:  ergocalciferol Take 50,000 Units by mouth every seven (7) days. * Notice: This list has 2 medication(s) that are the same as other medications prescribed for you. Read the directions carefully, and ask your doctor or other care provider to review them with you. Follow-up Instructions Return in about 5 months (around 11/28/2018). To-Do List   
 06/29/2018 Procedures: AMB POC PFT COMPLETE W/BRONCHODILATOR   
  
 06/29/2018 Procedures: AMB POC PFT COMPLETE W/O BRONCHODILATOR   
  
 06/29/2018 Procedures:  DIFFUSING CAPACITY   
  
 06/29/2018 Procedures:  GAS DILUT/WASHOUT LUNG VOL W/WO DISTRIB VENT&VOL   
  
 10/03/2018 Imaging:  CT CHEST W CONT Introducing [facility] & HEALTH SERVICES! [employer] introduces [employer] patient portal. Now you can access parts of your medical record, email your doctor's office, and request medication refills online. 1. In your internet browser, go to [email or web address] 2. Click on the First Time User? Click Here link in the Sign In box. You will see the New Member Sign Up page. 3. Enter your TourRadar Access Code exactly as it appears below. You will not need to use this code after youve completed the sign-up process. If you do not sign up before the expiration date, you must request a new code. · TourRadar Access Code: [email or web address] Expires: 9/26/2018  3:39 PM 
 
4. Enter the last four digits of your Social Security Number (xxxx) and Date of Birth (mm/dd/yyyy) as indicated and click Submit. You will be taken to the next sign-up page. 5. Create a TourRadar ID. This will be your TourRadar login ID and cannot be changed, so think of one that is secure and easy to remember. 6. Create a TourRadar password. You can change your password at any time. 7. Enter your Password Reset Question and Answer. This can be used at a later time if you forget your password. 8. Enter your e-mail address. You will receive e-mail notification when new information is available in [address] 9. Click Sign Up. You can now view and download portions of your medical record. 10. Click the Download Summary menu link to download a portable copy of your medical information. If you have questions, please visit the Frequently Asked Questions section of the TourRadar website. Remember, TourRadar is NOT to be used for urgent needs. For medical emergencies, dial 911. Now available from your iPhone and Android! Please provide this summary of care documentation to your next provider. Your primary care clinician is listed as Jb Harry. If you have any questions after today's visit, please call [phone].

## 2018-06-28 NOTE — PROGRESS NOTES
[address] [place] [address], Critical access hospital [address] [address]  [phone]    [facility]  Pulmonary, Critical Care, and Sleep Medicine    Pulmonary Followup  Name: Sherry Vee 71 y.o. female  MRN: [id number]  : 1945  Service Date: 18  Chief Complaint:   Chief Complaint   Patient presents with    Abnormal CT Scan     F/U to  CT 3/30         History of Present Illness:  Sherry Vee is a 71 y.o. female, who presents to Pulmonary clinic for followup of VTE-PE and DVT. Pt last seen on 18. F/U with Vasc Surg in August.  Pt reports she is asymptomatic. She is taking her anticoagulation as prescribed. No issues with bleeding  Pt denies any respiratory complaints, denies dyspnea, cough, hemoptysis, night sweats, N/V/D, URI      Allergies: I have reviewed the allergy hx  No Known Allergies    Medications:  I have reviewed the patient's medications  Prior to Admission medications    Medication Sig Start Date End Date Taking? Authorizing Provider   LACTOSE-REDUCED FOOD (ENSURE ACTIVE PROTEIN-MUSCLE PO) Take  by mouth. Yes Historical Provider   apixaban (ELIQUIS) 5 mg tablet Take 1 Tab by mouth two (2) times a day. Indications: pulmonary thromboembolism 18  Yes Lindsey Connelly MD   apixaban (ELIQUIS) 5 mg tablet Take 2 Tabs by mouth two (2) times a day. Indications: pulmonary thromboembolism 18  Yes Lindsey Connelly MD   amLODIPine (NORVASC) 5 mg tablet Take 5 mg by mouth daily. Yes Historical Provider   ergocalciferol (VITAMIN D2) 50,000 unit capsule Take 50,000 Units by mouth every seven (7) days. Yes Historical Provider   oxyCODONE-acetaminophen (PERCOCET) 5-325 mg per tablet Take 1 Tab by mouth every four (4) hours as needed. Max Daily Amount: 6 Tabs.  18   Lindsey Connelly MD       Immunizations:  I have reviewed the patient's immunizations  Immunization History   Administered Date(s) Administered    Influenza Vaccine 10/01/2017    Pneumococcal Vaccine (Unspecified Type) 10/01/2017       Review of Systems:  A complete review of systems was performed as stated in the HPI, all others are negative. Objective:    Physical Exam:  /70 (BP 1 Location: Left arm, BP Patient Position: Sitting)  Pulse 76  Temp 98.3 °F (36.8 °C)  Resp 18  Ht 5' 1\" (1.549 m)  Wt 62 kg (136 lb 12 oz)  SpO2 96%  BMI 25.84 kg/m2  Vitals were personally reviewed  Gen: no acute distress, pleasant and cooperative, sitting up in chair, able to climb to exam table w/o difficulty  HEENT: normocephalic/atraumatic, PERRLA, EOMI, no scleral icterus, nasal turbinates have no erythema, no nasal polyps, no oral lesions, good dentition, Mallampati II  Neck: supple, trachea midline, no JVD, no cervical and supraclavicular adenopathy  CVS: regular rate rhythm, S1/S2, no murmurs/rubs/gallops  Lungs: good air entry B/L, no wheezes/rales/rhonchi  Abdomen: soft, nontender, bowel sounds present, no hepatosplenomegaly  Ext: no pitting edema B/L, no peripheral cyanosis or clubbing  Neuro: grossly normal, AAOx3, normal strength and coordination grossly, no focal deficits  Skin: some ecchymosis along posterior right knee, no other rashes, erythema, lesions  Psych: normal memory, thought content, and processing    Labs:   I have reviewed the patient's available labs  Lab Results   Component Value Date/Time    WBC 7.2 01/12/2018 05:58 AM    HGB 12.0 01/12/2018 05:58 AM    HCT 36.8 01/12/2018 05:58 AM    PLATELET 936 47/00/8353 05:58 AM    MCV 80.9 01/12/2018 05:58 AM     Lab Results   Component Value Date/Time    Sodium 138 01/12/2018 05:58 AM    Potassium 3.7 01/12/2018 05:58 AM    Chloride 102 01/12/2018 05:58 AM    CO2 30 01/12/2018 05:58 AM    Anion gap 6 01/12/2018 05:58 AM    Glucose 101 (H) 01/12/2018 05:58 AM    BUN 10 01/12/2018 05:58 AM    Creatinine 0.74 01/12/2018 05:58 AM    BUN/Creatinine ratio 14 01/12/2018 05:58 AM    GFR est AA >60 01/12/2018 05:58 AM GFR est non-AA >60 01/12/2018 05:58 AM    Calcium 9.1 01/12/2018 05:58 AM         Imaging:  I have personally reviewed patient's imaging as follows -- CT chest from 3/30/18 shows improvement to RML pleural lesion- likely hematoma    CT Results (most recent):    Results from [place] encounter on 03/30/18   CT CHEST W CONT   Narrative Contrast enhanced CT of the chest.    CPT code 71299    CLINICAL HISTORY: Right lower lobe lung mass. Previous history of pulmonary  embolism. TECHNIQUE: 5 mm contrast enhanced ( 75 Isovue 300) MDCT of the chest obtained. Sagittal and coronal reformations then created with the original data set. All  CT scans at this facility are performed using dose optimization techniques as  appropriate to a performed exam, to include automated exposure control,  adjustment of the mA and/or kV according to patient's size (including  appropriate matching for site specific examinations), or use of iterative  reconstruction technique. COMPARISON: 1/9/2018    FINDINGS:     Lungs: There are fibrotic densities at both lung apices. There is band of  opacity in the right lower lobe adjacent to pleura, less solid and masslike than  on previous study. Band of opacity measures 2.4 x 0.9 cm, without previously  seen solid base on the pleura; previously more convex relative to pleura and  measuring 3.3 x 1.3 cm. There is also a band of opacity posteriorly in the left  lower lobe in the costophrenic sulcus. Airways: Patent. Pleural space: No effusions. Heart and pericardium: Normal.    Great vessels: Aorta and arch vessels normal. Decreased burden of chronic  thrombosis in pulmonary arteries. No acute-appearing embolism although study not  optimized for evaluation of pulmonary arteries. Lymph nodes: No adenopathy base of neck, axilla or karyn. Upper abdomen: Included solid organs and hollow viscera are unremarkable.          Impression IMPRESSION:    Slowly resolving right lower lobe pulmonary infarct. Neoplasm not likely. Additional band of opacity in the left lower lobe that may also be related to  previous infarct. Decreased quantity chronic thrombus of the pulmonary arteries. Biapical scarring in the lungs. TTE:  I have reviewed the patient's TTE results  No results found for this or any previous visit. Assessment and Plan:  71 y.o. female with:    Impression:  1. 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 No evidence of RV strain from PE  2. RLL opacity - pulmonary infarct vs mass. Pt asymptomatic -- likely infarct  3. Venous insufficiency:  Pt follows with Vascular Surgery - Dr. Cecil Guajardo:  Emperatriz Villaseñor over CT results with patient  -PFTs at next visit  -Repeat CT chest in Oct  -Discussed with the patient that she will need lifelong anticoagulation unless contraindication occurs.  -Counseled her on lifestyle precautions while being on anticoagulation avoid closed head injury and other body injury  -Continue Eliquis BID  -Management of venous insufficiency and stenting per Vascular Surgery - Dr. Christine Prince patient to followup with PCP regarding yearly mammogram and colonoscopy screenings      RTC: Follow-up Disposition:  Return in about 5 months (around 11/28/2018).       Orders Placed This Encounter    AMB POC PFT COMPLETE W/BRONCHODILATOR    AMB POC PFT COMPLETE W/O BRONCHODILATOR    GAS DILUT/WASHOUT LUNG VOL W/WO DISTRIB VENT&VOL    DIFFUSING CAPACITY    CT CHEST W BIENVENIDO Hayes MD/MPH     Pulmonary, Critical Care Medicine  [facility]  06/28/18  10:30 AM

## 2018-06-28 NOTE — LETTER
7/2/2018 1:33 AM 
 
Patient:  Nehemias Martinez YOB: 1945 Date of Visit: 6/28/2018 Dear Kavya Rasheed MD 
[address] [place] [address] VIA Facsimile: [phone] 
 : 
 
 
Thank you for referring Ms. Star Johnson to me for evaluation/treatment. Below are the relevant portions of my assessment and plan of care. If you have questions, please do not hesitate to call me. I look forward to following Ms. Aaron Krishnamurthy along with you. Sincerely, Mk Green MD/MPH Pulmonary, Critical Care Medicine [facility]

## 2018-08-13 ENCOUNTER — OFFICE VISIT (OUTPATIENT)
Dept: VASCULAR SURGERY | Age: 73
End: 2018-08-13

## 2018-08-13 VITALS
HEART RATE: 64 BPM | HEIGHT: 72 IN | WEIGHT: 136 LBS | RESPIRATION RATE: 16 BRPM | SYSTOLIC BLOOD PRESSURE: 124 MMHG | DIASTOLIC BLOOD PRESSURE: 80 MMHG | BODY MASS INDEX: 18.42 KG/M2

## 2018-08-13 DIAGNOSIS — I87.2 CHRONIC VENOUS INSUFFICIENCY: ICD-10-CM

## 2018-08-13 DIAGNOSIS — I87.303 CHRONIC VENOUS HYPERTENSION INVOLVING BOTH SIDES: Primary | ICD-10-CM

## 2018-08-13 NOTE — MR AVS SNAPSHOT
[address] [address] [address] [facility] [address] 
[phone] Patient: Delbert Martin MRN: [id number] :1945 Visit Information Date & Time Provider Department Dept. Phone Encounter #  
 2018 10:45 AM Sarah Ramírez MD [facility] [phone] Follow-up Instructions Return in about 5 months (around 2019). Follow-up and Disposition History Your Appointments 2018 10:45 AM  
Follow Up with Sarah Ramírez MD  
[employer] [facility] [address] [place] [address]) Appt Note: 5 MONTH SAME DAY WITH PREP; left generic message concerning prep location and arrival 15 min prior to appt and to call to confirm; Called to move up appt aty 1045am. Patient was here for studies and trying to call her to come earlier since doctor ascencio is at the office. Left message. [address] [facility] [address] [facility] [address]  
[phone] [address] [place] [address]  
  
    
 2019  9:00 AM  
PROCEDURE with [facility] IMAGING 2 [facility] ([address] [place] [address]) Appt Note: ivc alma cif stents sonu 6 months same day; SCHED ERROR; SCHED ERROR dr Ed Patricia wanted 5 month instead of 6 month [address] [facility] [address]12 [address] [facility] [address]  
[phone] [address]  
  
    
 2019 11:45 AM  
Follow Up with Sarah Ramírez MD  
[facility] [employer] [facility] [address] [place] [address]) Appt Note: 6 MONTH FOLLOW UP SAME DAY  WITH PREP; SCHED ERROR; SCHED ERROR/dr ascencio ordered 5 months instead of 6 months [address] [facility] 327 [address] [facility] [address]  
[phone] Upcoming Health Maintenance Date Due Hepatitis C Screening 1945 DTaP/Tdap/Td series (1 - Tdap) 1967 BREAST CANCER SCRN MAMMOGRAM 1996 FOBT Q 1 YEAR AGE 50-75 12/8/1996 ZOSTER VACCINE AGE 60> 10/8/2006 GLAUCOMA SCREENING Q2Y 12/8/2011 Bone Densitometry (Dexa) Screening 12/8/2011 MEDICARE YEARLY EXAM 3/28/2018 Influenza Age 5 to Adult 8/1/2018 Pneumococcal 65+ Low/Medium Risk (2 of 2 - PPSV23) 10/1/2022 Allergies as of 8/13/2018  Review Complete On: 8/13/2018 By: Claire Banegas MD  
 No Known Allergies Current Immunizations  Never Reviewed Name Date Influenza Vaccine 10/1/2017 Pneumococcal Vaccine (Unspecified Type) 10/1/2017 Not reviewed this visit You Were Diagnosed With   
  
 Codes Comments Chronic venous hypertension involving both sides    -  Primary ICD-10-CM: I87.303 ICD-9-CM: 459.30 Chronic venous insufficiency     ICD-10-CM: I87.2 ICD-9-CM: 459.81 Vitals BP Pulse Resp Height(growth percentile) Weight(growth percentile) BMI  
 124/80 (BP 1 Location: Left arm, BP Patient Position: Sitting) 64 16 6' (1.829 m) 136 lb (61.7 kg) 18.44 kg/m2 Smoking Status Never Smoker Vitals History BMI and BSA Data Body Mass Index Body Surface Area  
 18.44 kg/m 2 1.77 m 2 Your Updated Medication List  
  
   
This list is accurate as of 8/13/18 10:40 AM.  Always use your most recent med list. amLODIPine 5 mg tablet Commonly known as:  Emmy Blade Take 5 mg by mouth daily. * apixaban 5 mg tablet Commonly known as:  Cliffton Rubinstein Take 2 Tabs by mouth two (2) times a day. Indications: pulmonary thromboembolism * apixaban 5 mg tablet Commonly known as:  Cliffton Rubinstein Take 1 Tab by mouth two (2) times a day. Indications: pulmonary thromboembolism ENSURE ACTIVE PROTEIN-MUSCLE PO Take  by mouth. oxyCODONE-acetaminophen 5-325 mg per tablet Commonly known as:  PERCOCET Take 1 Tab by mouth every four (4) hours as needed. Max Daily Amount: 6 Tabs. VITAMIN D2 50,000 unit capsule Generic drug:  ergocalciferol Take 50,000 Units by mouth every seven (7) days. * Notice: This list has 2 medication(s) that are the same as other medications prescribed for you. Read the directions carefully, and ask your doctor or other care provider to review them with you. Follow-up Instructions Return in about 5 months (around 1/13/2019). To-Do List   
 10/03/2018 1:30 PM  
  Appointment with [facility] CT RM 2 at [facility] RAD [address] ([phone]) GENERAL INSTRUCTIONS  If you were given medications to take for a contrast allergy prior to having this study, please arrange to have someone drive you to your appointment. If you have had a creatinine level drawn within the past 30 days, please bring most recent results to your appt. This study does not require you to drink contrast prior to your study. MEDICATIONS  Bring a complete list of all medications you are currently taking to include prescriptions, over-the-counter meds, herbals, vitamins & any dietary supplements. STUDY DETAILS Patient must be NPO (nothing to eat/drink, including meds and water) for 4 hours prior to study. OUTSIDE FILMS  Bring outside films, CDs, and reports related to the study with you on the day of your exam.  QUESTIONS  Notify the CT Department if you have any questions concerning your study. Niels Belarusian - [phone] [facility] 751 - [phone]  
  
 01/13/2019 Vascular/US:  DUPLEX IVC COMPLETE Introducing [facility] & HEALTH SERVICES! [employer] introduces [employer] patient portal. Now you can access parts of your medical record, email your doctor's office, and request medication refills online. 1. In your internet browser, go to [email or web address] 2. Click on the First Time User? Click Here link in the Sign In box. You will see the New Member Sign Up page. 3. Enter your [employer] Access Code exactly as it appears below.  You will not need to use this code after youve completed the sign-up process. If you do not sign up before the expiration date, you must request a new code. · [employer]Blogs Access Code: [email or web address] Expires: 9/26/2018  3:39 PM 
 
4. Enter the last four digits of your Social Security Number (xxxx) and Date of Birth (mm/dd/yyyy) as indicated and click Submit. You will be taken to the next sign-up page. 5. Create a SportsBlogs ID. This will be your [employer]Blogs login ID and cannot be changed, so think of one that is secure and easy to remember. 6. Create a [employer]Blogs password. You can change your password at any time. 7. Enter your Password Reset Question and Answer. This can be used at a later time if you forget your password. 8. Enter your e-mail address. You will receive e-mail notification when new information is available in [address] 9. Click Sign Up. You can now view and download portions of your medical record. 10. Click the Download Summary menu link to download a portable copy of your medical information. If you have questions, please visit the Frequently Asked Questions section of the [employer]Blogs website. Remember, [employer]Blogs is NOT to be used for urgent needs. For medical emergencies, dial 911. Now available from your iPhone and Android! Please provide this summary of care documentation to your next provider. Your primary care clinician is listed as [facility]. If you have any questions after today's visit, please call [phone].

## 2018-08-13 NOTE — PROGRESS NOTES
Fabrizio Weber    Chief Complaint   Patient presents with    Blood Clot       History and Physical    Fabrizio Weber is a 71 y.o. female with previous history of extensive DVT of the right lower extremity and then had new extensive DVT of the left lower extremity. Patient ended up having bilateral lower extremity venography and was found to have bilateral common iliac vein issues. On the right she had webbing and on the left may Thurner. She was treated with bilateral common iliac vein stents. As well as thrombolysis of the left lower extremity. She is done extraordinarily well. She has minimal issues with swelling. She does wear her compression stockings for eating with swelling as well as comfort. Overall she has been doing absolutely wonderful and wants to stay on her Eliquis for life. Especially given her issues with bilateral extensive DVT. Currently no ulcerations or fevers or chills or shortness of breath. Past Medical History:   Diagnosis Date    Essential hypertension, benign     Nonspecific abnormal electrocardiogram (ECG) (EKG)     Undiagnosed cardiac murmurs     Unspecified congenital defect of septal closure      Past Surgical History:   Procedure Laterality Date    VASCULAR SURGERY PROCEDURE UNLIST       Patient Active Problem List   Diagnosis Code    Pulmonary emboli ([facility]) I26.99    Right lower lobe lung mass R91.8    Acute deep vein thrombosis (DVT) of left lower extremity ([facility]) I82.402    Acute deep vein thrombosis (DVT) of iliac vein of left lower extremity ([facility]) I82.422    Pulmonary embolus ([facility]) I26.99    Chronic venous hypertension involving both sides I87.303    Chronic venous insufficiency I87.2     Current Outpatient Prescriptions   Medication Sig Dispense Refill    LACTOSE-REDUCED FOOD (ENSURE ACTIVE PROTEIN-MUSCLE PO) Take  by mouth.  apixaban (ELIQUIS) 5 mg tablet Take 1 Tab by mouth two (2) times a day.  Indications: pulmonary thromboembolism 60 Tab 0    amLODIPine (NORVASC) 5 mg tablet Take 5 mg by mouth daily.  ergocalciferol (VITAMIN D2) 50,000 unit capsule Take 50,000 Units by mouth every seven (7) days.  apixaban (ELIQUIS) 5 mg tablet Take 2 Tabs by mouth two (2) times a day. Indications: pulmonary thromboembolism 13 Tab 0    oxyCODONE-acetaminophen (PERCOCET) 5-325 mg per tablet Take 1 Tab by mouth every four (4) hours as needed. Max Daily Amount: 6 Tabs. 10 Tab 0     No Known Allergies  Social History     Social History    Marital status:      Spouse name: N/A    Number of children: N/A    Years of education: N/A     Occupational History    Not on file. Social History Main Topics    Smoking status: Never Smoker    Smokeless tobacco: Never Used    Alcohol use No    Drug use: Not on file    Sexual activity: Not on file     Other Topics Concern    Not on file     Social History Narrative      History reviewed. No pertinent family history. Physical Exam:    Visit Vitals    /80 (BP 1 Location: Left arm, BP Patient Position: Sitting)    Pulse 64    Resp 16    Ht 6' (1.829 m)    Wt 136 lb (61.7 kg)    BMI 18.44 kg/m2      General: Well-appearing female in no acute distress  HEENT: EOMI no scleral icterus is noted  Pulmonary: No increased work of breathing is noted  Extremities: Warm and well-perfused bilaterally no visible varicose veins are identified bilaterally no ulcerations are identified no skin changes are identified  Neuro: Cranial nerves II through XII grossly intact    Impression and Plan:  Toya Felton is a 71 y.o. female with class I chronic venous insufficiency of bilateral lower extremities although with occasional report of edema at this could be class III chronic venous insufficiency bilaterally but it seems to be very well managed in either event. We will see her again in 5 months for her anniversary of the procedure.   I reviewed her ultrasound clinic today showing that her stents are widely patent and everything is moving appropriately. We already know that she has chronic DVT within the right lower extremity in the common femoral femoral and popliteal veins. And on the left lower extremity she has just some chronic DVT now within the gastroc vein. In either event she seems to be doing very well we will continue to follow her centrally with ultrasounds and it seems that we will continued with Eliquis consistently for her. She does have any difficulties or problems she is to call us immediately. We reviewed the plan with the patient and the patient understands. We also gave the patient appropriate instructions on their disease process and when to call back. Follow-up Disposition:  Return in about 6 months (around 2/13/2019). Vazquez Alarcon MD    PLEASE NOTE:  This document has been produced using voice recognition software. Unrecognized errors in transcription may be present.

## 2018-08-13 NOTE — PROGRESS NOTES
1. Have you been to an emergency room or urgent care clinic since your last visit?   no  Hospitalized since your last visit? If yes, where, when, and reason for visit? no  2. Have you seen or consulted any other health care providers outside of the [facility] since your last visit including any procedures, health maintenance items.  If yes, where, when and reason for visit? no

## 2018-10-02 ENCOUNTER — TELEPHONE (OUTPATIENT)
Dept: PULMONOLOGY | Age: 73
End: 2018-10-02

## 2018-10-02 NOTE — TELEPHONE ENCOUNTER
Per [email or web address] [email or web address], pt is scheduled for Ct chest 10/3/18. Nurse needs to call insurance to get auth d/t there is problem with facility. Please call [phone] and use member#.

## 2018-10-07 ENCOUNTER — HOSPITAL ENCOUNTER (OUTPATIENT)
Dept: CT IMAGING | Age: 73
Discharge: HOME | End: 2018-10-07
Attending: INTERNAL MEDICINE
Payer: COMMERCIAL

## 2018-10-07 DIAGNOSIS — I87.2 CHRONIC VENOUS INSUFFICIENCY: ICD-10-CM

## 2018-10-07 DIAGNOSIS — I26.99 OTHER ACUTE PULMONARY EMBOLISM WITHOUT ACUTE COR PULMONALE (HCC): ICD-10-CM

## 2018-10-07 DIAGNOSIS — R91.8 RIGHT LOWER LOBE LUNG MASS: ICD-10-CM

## 2018-10-07 LAB — CREAT UR-MCNC: 0.7 MG/DL (ref 0.6–1.3)

## 2018-10-07 PROCEDURE — 71260 CT THORAX DX C+: CPT

## 2018-10-07 PROCEDURE — 82565 ASSAY OF CREATININE: CPT

## 2018-10-07 PROCEDURE — 74011636320 HC RX REV CODE- 636/320: Performed by: INTERNAL MEDICINE

## 2018-10-07 RX ADMIN — IOPAMIDOL 77 ML: 612 INJECTION, SOLUTION INTRAVENOUS at 09:00

## 2018-11-23 ENCOUNTER — OFFICE VISIT (OUTPATIENT)
Dept: PULMONOLOGY | Age: 73
End: 2018-11-23

## 2018-11-23 VITALS
DIASTOLIC BLOOD PRESSURE: 64 MMHG | TEMPERATURE: 98.3 F | HEART RATE: 80 BPM | SYSTOLIC BLOOD PRESSURE: 140 MMHG | BODY MASS INDEX: 20.18 KG/M2 | WEIGHT: 149 LBS | HEIGHT: 72 IN | RESPIRATION RATE: 20 BRPM | OXYGEN SATURATION: 98 %

## 2018-11-23 DIAGNOSIS — R91.8 RIGHT LOWER LOBE LUNG MASS: ICD-10-CM

## 2018-11-23 DIAGNOSIS — I26.99 OTHER ACUTE PULMONARY EMBOLISM WITHOUT ACUTE COR PULMONALE (HCC): ICD-10-CM

## 2018-11-23 DIAGNOSIS — I87.2 CHRONIC VENOUS INSUFFICIENCY: ICD-10-CM

## 2018-11-23 DIAGNOSIS — J45.20 MILD INTERMITTENT ASTHMA WITHOUT COMPLICATION: Primary | ICD-10-CM

## 2018-11-23 NOTE — LETTER
11/23/2018 3:31 PM 
 
Patient:  Carlos Verma YOB: 1945 Date of Visit: 11/23/2018 Dear Pilar Monroy MD 
[address] [facility] [address] VIA Facsimile: [phone] DARC[address]/ [address] [facility] [address] [place] [address] VIA In Basket 
 : Thank you for referring Ms. Sarah Carrillo to me for evaluation/treatment. Below are the relevant portions of my assessment and plan of care. If you have questions, please do not hesitate to call me. I look forward to following MsRishi Paula along with you. Sincerely, Rochelle Boeck MD/MPH Pulmonary, Critical Care Medicine [facility]

## 2018-11-23 NOTE — PROGRESS NOTES
Chief Complaint Patient presents with  Lung Mass  
  follow up from 6/28/2018 (RLL lung mass); CT 10/7/2018  Other  
  pulmonary embolism 1. Have you been to the ER, urgent care clinic since your last visit? Hospitalized since your last visit? No 
 
2. Have you seen or consulted any other health care providers outside of the [address] [place] [address] since your last visit? Include any pap smears or colon screening.  No

## 2018-11-23 NOTE — PROGRESS NOTES
[address] [place] [address], [address] [place] [address] 
[phone] [facility] Pulmonary, Critical Care, and Sleep Medicine Pulmonary Followup Name: Scottie Ledezma 71 y.o. female MRN: [id number] : 1945 Service Date: 18 Chief Complaint:  
Chief Complaint Patient presents with  Lung Mass  
  follow up from 2018 (RLL lung mass); CT 10/7/2018  Other  
  pulmonary embolism  Shortness of Breath History of Present Illness: 
Scottie Ledezma is a 71 y.o. female, who presents to Pulmonary clinic for followup of VTE-PE and DVT. Pt last seen on 18. No new issues in the interval 
Pt reports some dyspnea with secondhand smoke and fragrances (scented candles), no other triggers. Pt continues to take her anticoagulation as prescribed. No issues with bleeding Pt denies any respiratory complaints, denies dyspnea, cough, hemoptysis, night sweats, N/V/D, URI Allergies: I have reviewed the allergy hx No Known Allergies Medications:  I have reviewed the patient's medications Prior to Admission medications Medication Sig Start Date End Date Taking? Authorizing Provider LACTOSE-REDUCED FOOD (ENSURE ACTIVE PROTEIN-MUSCLE PO) Take  by mouth. Provider, Historical  
apixaban (ELIQUIS) 5 mg tablet Take 1 Tab by mouth two (2) times a day. Indications: pulmonary thromboembolism 18   Jenifer Beebe MD  
apixaban (ELIQUIS) 5 mg tablet Take 2 Tabs by mouth two (2) times a day. Indications: pulmonary thromboembolism 18   Jenifer Beebe MD  
oxyCODONE-acetaminophen (PERCOCET) 5-325 mg per tablet Take 1 Tab by mouth every four (4) hours as needed. Max Daily Amount: 6 Tabs. 18   Jenifer Beebe MD  
amLODIPine (NORVASC) 5 mg tablet Take 5 mg by mouth daily. Provider, Historical  
ergocalciferol (VITAMIN D2) 50,000 unit capsule Take 50,000 Units by mouth every seven (7) days.     Provider, Historical  
 
 Past Medical History:  
Diagnosis Date  Essential hypertension, benign  Nonspecific abnormal electrocardiogram (ECG) (EKG)  Undiagnosed cardiac murmurs  Unspecified congenital defect of septal closure Past Surgical History:  
Procedure Laterality Date  VASCULAR SURGERY PROCEDURE UNLIST History reviewed. No pertinent family history. Social History Tobacco Use  Smoking status: Never Smoker  Smokeless tobacco: Never Used Substance Use Topics  Alcohol use: No  
 Drug use: Not on file Immunizations:  I have reviewed the patient's immunizations Immunization History Administered Date(s) Administered  Influenza Vaccine 10/01/2017  Pneumococcal Vaccine (Unspecified Type) 10/01/2017 Review of Systems: A complete review of systems was performed as stated in the HPI, all others are negative. Objective: 
 
Physical Exam: 
/64 (BP 1 Location: Left arm, BP Patient Position: At rest)   Pulse 80   Temp 98.3 °F (36.8 °C) (Oral)   Resp 20   Ht 6' (1.829 m)   Wt 67.6 kg (149 lb)   SpO2 98%   BMI 20.21 kg/m² Vitals were personally reviewed Gen: no acute distress, pleasant and cooperative, sitting up in chair, able to climb to exam table w/o difficulty HEENT: normocephalic/atraumatic, PERRLA, EOMI, no scleral icterus, nasal turbinates have no erythema, no nasal polyps, no oral lesions, good dentition, Mallampati II Neck: supple, trachea midline, no JVD, no cervical and supraclavicular adenopathy CVS: regular rate rhythm, S1/S2, no murmurs/rubs/gallops Lungs: good air entry B/L, CTABL, no wheezes/rales/rhonchi Abdomen: soft, nontender, bowel sounds present, no hepatosplenomegaly Ext: no pitting edema B/L, no peripheral cyanosis or clubbing Neuro: grossly normal, AAOx3, normal strength and coordination grossly, no focal deficits Skin: no rashes, erythema, lesions Psych: normal memory, thought content, and processing Labs:  I have reviewed the patient's available labs Lab Results Component Value Date/Time WBC 7.2 01/12/2018 05:58 AM  
 HGB 12.0 01/12/2018 05:58 AM  
 HCT 36.8 01/12/2018 05:58 AM  
 PLATELET 752 31/16/1560 05:58 AM  
 MCV 80.9 01/12/2018 05:58 AM  
 
Lab Results Component Value Date/Time Sodium 138 01/12/2018 05:58 AM  
 Potassium 3.7 01/12/2018 05:58 AM  
 Chloride 102 01/12/2018 05:58 AM  
 CO2 30 01/12/2018 05:58 AM  
 Anion gap 6 01/12/2018 05:58 AM  
 Glucose 101 (H) 01/12/2018 05:58 AM  
 BUN 10 01/12/2018 05:58 AM  
 Creatinine 0.74 01/12/2018 05:58 AM  
 BUN/Creatinine ratio 14 01/12/2018 05:58 AM  
 GFR est AA >60 01/12/2018 05:58 AM  
 GFR est non-AA >60 01/12/2018 05:58 AM  
 Calcium 9.1 01/12/2018 05:58 AM  
 
 
 
Imaging:  I have personally reviewed patient's imaging as follows -- CT chest from 10/7/18 compared to 3/30/18 shows improvement in RML pleural lesion now appears like some scarring with mild thickening, no masses/effusions/infilrates Official report per Radiology CT Results (most recent): 
Results from Hospital Encounter encounter on 10/07/18 CT CHEST W CONT Narrative EXAM:  CT Chest with Contrast. 
          
CLINICAL INDICATION:  PE history. Right lower lobe lung mass. Chronic venous 
insufficiency. Follow-up evaluation for the right lower lobe masslike density. COMPARISON:  CTA chest 1/9/18, 3/30/18. TECHNIQUE:  Helically scanned volumetric axial sections of the chest are 
obtained without IV contrast administration. Coronal and sagittal multiplanar 
reconstruction images are generated for improved anatomic delineation. 
 
- Radiation Dose:   mGy-cm. 
- Note:  Radiation dose optimization techniques are utilized as appropriate to 
the exam, with combination of automated exposure control, adjustment of the mA 
and/or kV according to patient's size (Including appropriate matching for site-specific examinations), or use of iterative reconstruction technique. FINDINGS:  
 
Lungs: - The previously observed right lower lobe irregular into basal region density 
abutting the pleural surface is markedly decreased in conspicuity. Irregular 2.0 x 0.8 x 0.7 cm density is still identifiable, most likely representing 
residual scar tissue. 
- Right upper lobe lateral aspect focal scar tissue, stable dating back to least 
1/9/18. 
- Left lower lobe posterior lung base scar tissue. 
- No suspicious new parenchymal nodule or mass. No airspace opacities. Pleura:  No significant pleural effusion is detected bilaterally. Mediastinum, Michelle:  No adenopathy. Base of Neck, Axillae:  No adenopathy. Esophagus:  Unremarkable. Abdomen: The visualized portions of the upper abdomen are unremarkable. Bones:  No acute findings. Impression IMPRESSION:  
          
Continuing decreasing trend for the right lower lobe anterior basal region 
peripheral density abutting the pleura. Remnant density, probably from scar 
tissue, but continued follow-up is felt to be needed to ascertain long term 
stability. Additional scattered scar tissue bilaterally. TTE:  I have reviewed the patient's TTE results No results found for this or any previous visit. Assessment and Plan: 
71 y.o. female with: 
 
Impression: 1. 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 No evidence of RV strain from PE 2. RLL opacity - pulmonary infarct. Pt asymptomatic -- likely infarct. Improving on CT from 3/30 to 10/7/18 3. Venous insufficiency:  Pt follows with Vascular Surgery - Dr. Braxton Hi 4. Mild intermittent asthma:  Based on PFT results, pt has some mild dyspnea to triggers - asymptomatic otherwise Plan: 
-Went over CT results with patient, repeat CT in 6 months 
-Offered PRN albuterol HFA to patient, she declines at this time -Continue lifelong anticoagulation unless contraindication occurs. 
-Counseled her on lifestyle precautions while being on anticoagulation avoid closed head injury and other body injury 
-Continue Eliquis BID 
-Management of venous insufficiency and stenting per Vascular Surgery - Dr. Estefany Linton patient to followup with PCP regarding yearly mammogram and colonoscopy screenings RTC: Follow-up Disposition: 
Return in about 6 months (around 5/23/2019). Orders Placed This Encounter  CT CHEST WO CONT Cyn Love MD/MPH Pulmonary, Critical Care Medicine [facility]

## 2019-01-21 ENCOUNTER — OFFICE VISIT (OUTPATIENT)
Dept: VASCULAR SURGERY | Age: 74
End: 2019-01-21

## 2019-01-21 VITALS
HEIGHT: 72 IN | WEIGHT: 149 LBS | HEART RATE: 74 BPM | RESPIRATION RATE: 16 BRPM | BODY MASS INDEX: 20.18 KG/M2 | SYSTOLIC BLOOD PRESSURE: 160 MMHG | DIASTOLIC BLOOD PRESSURE: 72 MMHG

## 2019-01-21 DIAGNOSIS — I87.2 CHRONIC VENOUS INSUFFICIENCY: Primary | ICD-10-CM

## 2019-01-21 DIAGNOSIS — I87.303 CHRONIC VENOUS HYPERTENSION INVOLVING BOTH SIDES: ICD-10-CM

## 2019-01-21 NOTE — PROGRESS NOTES
Janina Sandy Chief Complaint Patient presents with  Blood Clot History and Physical   
Janina Sandy is a 68 y.o. female with previous extensive DVT of bilateral lower extremities. Patient has chronic DVT of bilateral lower extremities. She ended up having thrombolysis of the left lower extremity and bilateral common iliac vein stenting secondary to her chronic venous insufficiency. Overall patient states that she is doing amazingly well. Her swelling and discomfort that she had in her lower extremities are completely gone. She has no complaints. No fevers or chills or shortness of breath. Past Medical History:  
Diagnosis Date  Essential hypertension, benign  Nonspecific abnormal electrocardiogram (ECG) (EKG)  Undiagnosed cardiac murmurs  Unspecified congenital defect of septal closure Past Surgical History:  
Procedure Laterality Date  VASCULAR SURGERY PROCEDURE UNLIST Patient Active Problem List  
Diagnosis Code  Pulmonary emboli ([facility]) I26.99  
 Right lower lobe lung mass R91.8  Acute deep vein thrombosis (DVT) of left lower extremity ([facility]) I82.402  Acute deep vein thrombosis (DVT) of iliac vein of left lower extremity ([facility]) I82.422  Pulmonary embolus ([facility]) I26.99  
 Chronic venous hypertension involving both sides I87.303  Chronic venous insufficiency I87.2 Current Outpatient Medications Medication Sig Dispense Refill  LACTOSE-REDUCED FOOD (ENSURE ACTIVE PROTEIN-MUSCLE PO) Take  by mouth.  apixaban (ELIQUIS) 5 mg tablet Take 1 Tab by mouth two (2) times a day. Indications: pulmonary thromboembolism 60 Tab 0  
 apixaban (ELIQUIS) 5 mg tablet Take 2 Tabs by mouth two (2) times a day. Indications: pulmonary thromboembolism 13 Tab 0  
 oxyCODONE-acetaminophen (PERCOCET) 5-325 mg per tablet Take 1 Tab by mouth every four (4) hours as needed. Max Daily Amount: 6 Tabs.  10 Tab 0  
  amLODIPine (NORVASC) 5 mg tablet Take 5 mg by mouth daily.  ergocalciferol (VITAMIN D2) 50,000 unit capsule Take 50,000 Units by mouth every seven (7) days. No Known Allergies Social History Socioeconomic History  Marital status:  Spouse name: Not on file  Number of children: Not on file  Years of education: Not on file  Highest education level: Not on file Social Needs  Financial resource strain: Not on file  Food insecurity - worry: Not on file  Food insecurity - inability: Not on file  Transportation needs - medical: Not on file  Transportation needs - non-medical: Not on file Occupational History  Not on file Tobacco Use  Smoking status: Never Smoker  Smokeless tobacco: Never Used Substance and Sexual Activity  Alcohol use: No  
 Drug use: Not on file  Sexual activity: Not on file Other Topics Concern  Not on file Social History Narrative  Not on file History reviewed. No pertinent family history. Physical Exam:   
Visit Vitals /72 (BP 1 Location: Left arm, BP Patient Position: Sitting) Pulse 74 Resp 16 Ht 6' (1.829 m) Wt 149 lb (67.6 kg) BMI 20.21 kg/m² General: Well-appearing female in no acute distress HEENT: EOMI no scleral icterus is noted patient is wearing eyeglasses Pulmonary: No increased work of breathing is noted Extremities: Warm and perfused bilaterally no edema is located in bilateral lower extremities no ulcerations are identified Neuro: Cranial nerves II through XII are grossly intact Impression and Plan: 
Kaycee Gloria is a 68 y.o. female with class I chronic venous insufficiency of bilateral lower extremities. She now has bilateral common iliac vein stents which are completely patent based on her ultrasound. She seems to be doing amazingly well. She is now one year status post stenting.   We will have her come back in another year for repeat ultrasounds. I reviewed her ultrasounds in clinic today showing no thrombosis. We reviewed the plan with the patient and the patient understands. We also gave the patient appropriate instructions on their disease process and when to call back. Greater than 50% of this visit was spent with face to face discussion. Follow-up Disposition: 
Return in about 1 year (around 1/21/2020). Lynsey Li MD 
 
PLEASE NOTE: 
This document has been produced using voice recognition software. Unrecognized errors in transcription may be present.

## 2019-01-21 NOTE — PROGRESS NOTES
1. Have you been to an emergency room or urgent care clinic since your last visit? No 
Hospitalized since your last visit? If yes, where, when, and reason for visit? No 
2. Have you seen or consulted any other health care providers outside of the [facility] since your last visit including any procedures, health maintenance items. If yes, where, when and reason for visit?

## 2019-05-17 ENCOUNTER — HOSPITAL ENCOUNTER (OUTPATIENT)
Dept: CT IMAGING | Age: 74
Discharge: HOME | End: 2019-05-17
Attending: INTERNAL MEDICINE
Payer: COMMERCIAL

## 2019-05-17 DIAGNOSIS — I26.99 OTHER ACUTE PULMONARY EMBOLISM WITHOUT ACUTE COR PULMONALE (HCC): ICD-10-CM

## 2019-05-17 DIAGNOSIS — R91.8 RIGHT LOWER LOBE LUNG MASS: ICD-10-CM

## 2019-05-17 PROCEDURE — 71250 CT THORAX DX C-: CPT

## 2020-02-24 ENCOUNTER — OFFICE VISIT (OUTPATIENT)
Dept: VASCULAR SURGERY | Age: 75
End: 2020-02-24

## 2020-02-24 VITALS
OXYGEN SATURATION: 96 % | WEIGHT: 149 LBS | BODY MASS INDEX: 20.18 KG/M2 | HEART RATE: 70 BPM | SYSTOLIC BLOOD PRESSURE: 120 MMHG | DIASTOLIC BLOOD PRESSURE: 70 MMHG | RESPIRATION RATE: 17 BRPM | HEIGHT: 72 IN

## 2020-02-24 DIAGNOSIS — I87.2 CHRONIC VENOUS INSUFFICIENCY: Primary | ICD-10-CM

## 2020-02-24 DIAGNOSIS — I87.303 CHRONIC VENOUS HYPERTENSION INVOLVING BOTH SIDES: ICD-10-CM

## 2020-02-24 NOTE — PROGRESS NOTES
Mariama Hill    Chief Complaint   Patient presents with    Blood Clot       History and Physical    Mariama Hill is a 69 y.o. female who had thrombolysis of the left lower extremity ended up getting bilateral common iliac vein stenting. Overall seems to be doing very well. She had a web within the right common iliac vein and may Thurner on the left side. Overall patient seems to be doing very well she has no venous claudication no rest pain no swelling. Past Medical History:   Diagnosis Date    Essential hypertension, benign     Nonspecific abnormal electrocardiogram (ECG) (EKG)     Undiagnosed cardiac murmurs     Unspecified congenital defect of septal closure      Past Surgical History:   Procedure Laterality Date    VASCULAR SURGERY PROCEDURE UNLIST       Patient Active Problem List   Diagnosis Code    Pulmonary emboli ([facility]) I26.99    Right lower lobe lung mass R91.8    Acute deep vein thrombosis (DVT) of left lower extremity ([facility]) I82.402    Acute deep vein thrombosis (DVT) of iliac vein of left lower extremity ([facility]) I82.422    Pulmonary embolus ([facility]) I26.99    Chronic venous hypertension involving both sides I87.303    Chronic venous insufficiency I87.2     Current Outpatient Medications   Medication Sig Dispense Refill    LACTOSE-REDUCED FOOD (ENSURE ACTIVE PROTEIN-MUSCLE PO) Take  by mouth.  apixaban (ELIQUIS) 5 mg tablet Take 1 Tab by mouth two (2) times a day. Indications: pulmonary thromboembolism 60 Tab 0    amLODIPine (NORVASC) 5 mg tablet Take 5 mg by mouth daily.  ergocalciferol (VITAMIN D2) 50,000 unit capsule Take 50,000 Units by mouth every seven (7) days.  apixaban (ELIQUIS) 5 mg tablet Take 2 Tabs by mouth two (2) times a day. Indications: pulmonary thromboembolism 13 Tab 0    oxyCODONE-acetaminophen (PERCOCET) 5-325 mg per tablet Take 1 Tab by mouth every four (4) hours as needed. Max Daily Amount: 6 Tabs.  10 Tab 0     No Known Allergies  Social History Socioeconomic History    Marital status:      Spouse name: Not on file    Number of children: Not on file    Years of education: Not on file    Highest education level: Not on file   Occupational History    Not on file   Social Needs    Financial resource strain: Not on file    Food insecurity:     Worry: Not on file     Inability: Not on file    Transportation needs:     Medical: Not on file     Non-medical: Not on file   Tobacco Use    Smoking status: Never Smoker    Smokeless tobacco: Never Used   Substance and Sexual Activity    Alcohol use: No    Drug use: Not on file    Sexual activity: Not on file   Lifestyle    Physical activity:     Days per week: Not on file     Minutes per session: Not on file    Stress: Not on file   Relationships    Social connections:     Talks on phone: Not on file     Gets together: Not on file     Attends [employer] service: Not on file     Active member of club or organization: Not on file     Attends meetings of clubs or organizations: Not on file     Relationship status: Not on file    Intimate partner violence:     Fear of current or ex partner: Not on file     Emotionally abused: Not on file     Physically abused: Not on file     Forced sexual activity: Not on file   Other Topics Concern    Not on file   Social History Narrative    Not on file      History reviewed. No pertinent family history.     Physical Exam:    Visit Vitals  /70 (BP 1 Location: Left arm, BP Patient Position: Sitting)   Pulse 70   Resp 17   Ht 6' (1.829 m)   Wt 149 lb (67.6 kg)   SpO2 96%   BMI 20.21 kg/m²      General: Well-appearing female no acute distress  HEENT: EOMI no scleral icterus is noted  Pulmonary: No increased work of breathing is noted  Extremities: Warm and perfused bilaterally no visible ulcerations are identified bilaterally no edema in today's visit  Neuro: Cranial nerves II through XII are grossly intact    Impression and Plan:  Trace Diaz is a 69 y.o. female who had chronic venous insufficiency with web on the right common iliac vein and DVT to the left lower extremity requiring thrombolysis. She ended up getting bilateral common iliac vein stenting. Overall seems to doing very well her ultrasound clinic today shows that her stents are widely patent and everything is moving in the right direction. Patient continues to state that she would prefer to stay on Eliquis. We are now at the point where we can continue to follow her yearly with ultrasounds. We reviewed the plan with the patient and the patient understands. We also gave the patient appropriate instructions on their disease process and when to call back. Greater than 50% of this visit was spent with face to face discussion. Follow-up and Dispositions    · Return in about 1 year (around 2/24/2021). Lucius Carslon MD    PLEASE NOTE:  This document has been produced using voice recognition software. Unrecognized errors in transcription may be present.

## 2020-02-24 NOTE — PROGRESS NOTES
1. Have you been to an emergency room or urgent care clinic since your last visit? NO    Hospitalized since your last visit? If yes, where, when, and reason for visit? NO  2. Have you seen or consulted any other health care providers outside of the [facility] since your last visit including any procedures, health maintenance items. If yes, where, when and reason for visit?  NO

## 2021-03-04 DIAGNOSIS — I87.303 CHRONIC VENOUS HYPERTENSION INVOLVING BOTH SIDES: Primary | ICD-10-CM

## 2021-03-04 DIAGNOSIS — I87.2 CHRONIC VENOUS INSUFFICIENCY: ICD-10-CM

## 2021-03-09 ENCOUNTER — OFFICE VISIT (OUTPATIENT)
Dept: VASCULAR SURGERY | Age: 76
End: 2021-03-09

## 2021-03-09 VITALS
DIASTOLIC BLOOD PRESSURE: 60 MMHG | RESPIRATION RATE: 20 BRPM | HEART RATE: 78 BPM | OXYGEN SATURATION: 99 % | SYSTOLIC BLOOD PRESSURE: 136 MMHG

## 2021-03-09 DIAGNOSIS — I82.5Y3 CHRONIC DEEP VEIN THROMBOSIS (DVT) OF PROXIMAL VEIN OF BOTH LOWER EXTREMITIES (HCC): Primary | ICD-10-CM

## 2021-03-09 PROCEDURE — 99203 OFFICE O/P NEW LOW 30 MIN: CPT | Performed by: SURGERY

## 2021-03-09 NOTE — PROGRESS NOTES
[facility]        History and Physical      78 y/o AAF c hx of alma Iliac vein stents for chronic DVT and venous insufficiency. Doing well. No edema. No pain. No varicose veins. Past Medical History:   Diagnosis Date    Essential hypertension, benign     Nonspecific abnormal electrocardiogram (ECG) (EKG)     Undiagnosed cardiac murmurs     Unspecified congenital defect of septal closure      Patient Active Problem List   Diagnosis Code    Pulmonary emboli ([facility]) I26.99    Right lower lobe lung mass R91.8    Acute deep vein thrombosis (DVT) of left lower extremity ([facility]) I82.402    Acute deep vein thrombosis (DVT) of iliac vein of left lower extremity ([facility]) I82.422    Pulmonary embolus ([facility]) I26.99    Chronic venous hypertension involving both sides I87.303    Chronic venous insufficiency I87.2     Past Surgical History:   Procedure Laterality Date    VASCULAR SURGERY PROCEDURE UNLIST       Current Outpatient Medications   Medication Sig Dispense Refill    LACTOSE-REDUCED FOOD (ENSURE ACTIVE PROTEIN-MUSCLE PO) Take  by mouth.  apixaban (ELIQUIS) 5 mg tablet Take 1 Tab by mouth two (2) times a day. Indications: pulmonary thromboembolism 60 Tab 0    apixaban (ELIQUIS) 5 mg tablet Take 2 Tabs by mouth two (2) times a day. Indications: pulmonary thromboembolism 13 Tab 0    amLODIPine (NORVASC) 5 mg tablet Take 5 mg by mouth daily.  ergocalciferol (VITAMIN D2) 50,000 unit capsule Take 50,000 Units by mouth every seven (7) days.  oxyCODONE-acetaminophen (PERCOCET) 5-325 mg per tablet Take 1 Tab by mouth every four (4) hours as needed. Max Daily Amount: 6 Tabs.  10 Tab 0     No Known Allergies  Social History     Socioeconomic History    Marital status:      Spouse name: Not on file    Number of children: Not on file    Years of education: Not on file    Highest education level: Not on file   Occupational History    Not on file   Social Needs    Financial resource strain: Not on file    Food insecurity     Worry: Not on file     Inability: Not on file    Transportation needs     Medical: Not on file     Non-medical: Not on file   Tobacco Use    Smoking status: Never Smoker    Smokeless tobacco: Never Used   Substance and Sexual Activity    Alcohol use: No    Drug use: Not on file    Sexual activity: Not on file   Lifestyle    Physical activity     Days per week: Not on file     Minutes per session: Not on file    Stress: Not on file   Relationships    Social connections     Talks on phone: Not on file     Gets together: Not on file     Attends [employer] service: Not on file     Active member of club or organization: Not on file     Attends meetings of clubs or organizations: Not on file     Relationship status: Not on file    Intimate partner violence     Fear of current or ex partner: Not on file     Emotionally abused: Not on file     Physically abused: Not on file     Forced sexual activity: Not on file   Other Topics Concern    Not on file   Social History Narrative    Not on file      No family history on file. ROS       Physical Exam:    Visit Vitals  /60 (BP 1 Location: Left upper arm, BP Patient Position: Sitting, BP Cuff Size: Adult)   Pulse 78   Resp 20   SpO2 99%      Physical Exam     Extremities-No edema alma, 2+ DP, PT pulses, No varicose veins, no skin bornzing or signs of lipodermatosclerosis    Impression and Plan:    ICD-10-CM ICD-9-CM    1. Chronic deep vein thrombosis (DVT) of proximal vein of both lower extremities ([facility])  I82.5Y3 453.51 DUPLEX IVC COMPLETE         Follow-up and Dispositions    · Return in about 1 year (around 3/9/2022) for Duplex US of IVC and alma Iliac vein stents. Doing well after iliac vein stenting. Attestation: Time spent with this patient was 20 minutes, more than half of which was engaged in education regarding the condition and discussing the treatment plan face-to-face.     Note: This note was created with voice transcription software so may include minor medical and punctuation errors related to this.     Marlon Wall MD

## 2021-03-09 NOTE — PROGRESS NOTES
1. Have you been to an emergency room or urgent care clinic since your last visit? No     Hospitalized since your last visit? If yes, where, when, and reason for visit? No     2. Have you seen or consulted any other health care providers outside of the [facility] since your last visit including any procedures, health maintenance items. If yes, where, when and reason for visit?  Yes ; pcp         3 most recent PHQ Screens 3/9/2021   Little interest or pleasure in doing things Not at all   Feeling down, depressed, irritable, or hopeless Not at all   Total Score PHQ 2 0

## 2022-03-02 ENCOUNTER — TRANSCRIBE ORDER (OUTPATIENT)
Dept: SCHEDULING | Age: 77
End: 2022-03-02

## 2022-03-02 DIAGNOSIS — Z12.31 SCREENING MAMMOGRAM FOR HIGH-RISK PATIENT: Primary | ICD-10-CM

## 2022-03-09 ENCOUNTER — OFFICE VISIT (OUTPATIENT)
Dept: VASCULAR SURGERY | Age: 77
End: 2022-03-09

## 2022-03-09 VITALS
HEIGHT: 72 IN | WEIGHT: 149.03 LBS | DIASTOLIC BLOOD PRESSURE: 74 MMHG | SYSTOLIC BLOOD PRESSURE: 132 MMHG | BODY MASS INDEX: 20.19 KG/M2 | HEART RATE: 98 BPM | OXYGEN SATURATION: 97 %

## 2022-03-09 DIAGNOSIS — I82.5Y3 CHRONIC DEEP VEIN THROMBOSIS (DVT) OF PROXIMAL VEIN OF BOTH LOWER EXTREMITIES (HCC): Primary | ICD-10-CM

## 2022-03-09 DIAGNOSIS — I82.5Y3 CHRONIC DEEP VEIN THROMBOSIS (DVT) OF PROXIMAL VEIN OF BOTH LOWER EXTREMITIES (HCC): ICD-10-CM

## 2022-03-09 PROCEDURE — G8536 NO DOC ELDER MAL SCRN: HCPCS | Performed by: STUDENT IN AN ORGANIZED HEALTH CARE EDUCATION/TRAINING PROGRAM

## 2022-03-09 PROCEDURE — 99212 OFFICE O/P EST SF 10 MIN: CPT | Performed by: STUDENT IN AN ORGANIZED HEALTH CARE EDUCATION/TRAINING PROGRAM

## 2022-03-09 PROCEDURE — G8510 SCR DEP NEG, NO PLAN REQD: HCPCS | Performed by: STUDENT IN AN ORGANIZED HEALTH CARE EDUCATION/TRAINING PROGRAM

## 2022-03-09 PROCEDURE — G8420 CALC BMI NORM PARAMETERS: HCPCS | Performed by: STUDENT IN AN ORGANIZED HEALTH CARE EDUCATION/TRAINING PROGRAM

## 2022-03-09 PROCEDURE — 3017F COLORECTAL CA SCREEN DOC REV: CPT | Performed by: STUDENT IN AN ORGANIZED HEALTH CARE EDUCATION/TRAINING PROGRAM

## 2022-03-09 PROCEDURE — G8400 PT W/DXA NO RESULTS DOC: HCPCS | Performed by: STUDENT IN AN ORGANIZED HEALTH CARE EDUCATION/TRAINING PROGRAM

## 2022-03-09 PROCEDURE — 1090F PRES/ABSN URINE INCON ASSESS: CPT | Performed by: STUDENT IN AN ORGANIZED HEALTH CARE EDUCATION/TRAINING PROGRAM

## 2022-03-09 PROCEDURE — 1101F PT FALLS ASSESS-DOCD LE1/YR: CPT | Performed by: STUDENT IN AN ORGANIZED HEALTH CARE EDUCATION/TRAINING PROGRAM

## 2022-03-09 PROCEDURE — G8427 DOCREV CUR MEDS BY ELIG CLIN: HCPCS | Performed by: STUDENT IN AN ORGANIZED HEALTH CARE EDUCATION/TRAINING PROGRAM

## 2022-03-09 RX ORDER — HYDROCHLOROTHIAZIDE 12.5 MG/1
12.5 CAPSULE ORAL DAILY
COMMUNITY
Start: 2022-02-28

## 2022-03-09 NOTE — PROGRESS NOTES
03/09/22        Anatoliy Cummings        History and Physical    Anatoliy Cummings is a 77 y.o. female here for 1 year follow up for evaluation of iliac vein stents. Patient has been doing well since the stenting. Denies any leg swelling, no varicose veins nor any active symptoms at all. Review of symptoms is normal  Physical examination of all systems is normal    Physical Exam:    Visit Vitals  /74 (BP 1 Location: Left upper arm, BP Patient Position: Sitting)   Pulse 98   Ht 6' (1.829 m)   Wt 149 lb 0.5 oz (67.6 kg)   SpO2 97%   BMI 20.21 kg/m²      HEENT-PERRLA exactly movements intact, no scleral icterus, mucous membranes pink and moist  Neck-no JVD, no carotid bruits  Heart-regular rate and rhythm no murmurs, rubs or gallops  Chest-clear to auscultation bilaterally no wheezes, rhonchi or rubs  Abdomen-soft, nontender, no palpable organomegaly or masses, no palpable pulsatile masses  Extremities-no clubbing, cyanosis or edema. No wounds or gangrenous changes to the toes or feet. Skin is intact. Feet are pink warm and well-perfused bilaterally  Pulses-carotid, radial, brachial, femoral 2+ bilaterally.  Bilateral doppler signals dorsalis pedis, posterior tibial       Past Medical History:   Diagnosis Date    Essential hypertension, benign     Nonspecific abnormal electrocardiogram (ECG) (EKG)     Undiagnosed cardiac murmurs     Unspecified congenital defect of septal closure      Past Surgical History:   Procedure Laterality Date    VASCULAR SURGERY PROCEDURE UNLIST       Patient Active Problem List   Diagnosis Code    Pulmonary emboli ([facility]) I26.99    Right lower lobe lung mass R91.8    Acute deep vein thrombosis (DVT) of left lower extremity ([facility]) I82.402    Acute deep vein thrombosis (DVT) of iliac vein of left lower extremity ([facility]) I82.422    Pulmonary embolus ([facility]) I26.99    Chronic venous hypertension involving both sides I87.303    Chronic venous insufficiency I87.2     Current Outpatient Medications   Medication Sig Dispense Refill    hydroCHLOROthiazide (MICROZIDE) 12.5 mg capsule Take 12.5 mg by mouth daily.  LACTOSE-REDUCED FOOD (ENSURE ACTIVE PROTEIN-MUSCLE PO) Take  by mouth.  apixaban (ELIQUIS) 5 mg tablet Take 1 Tab by mouth two (2) times a day. Indications: pulmonary thromboembolism 60 Tab 0    apixaban (ELIQUIS) 5 mg tablet Take 2 Tabs by mouth two (2) times a day. Indications: pulmonary thromboembolism 13 Tab 0    amLODIPine (NORVASC) 5 mg tablet Take 5 mg by mouth daily.  ergocalciferol (VITAMIN D2) 50,000 unit capsule Take 50,000 Units by mouth every seven (7) days.  oxyCODONE-acetaminophen (PERCOCET) 5-325 mg per tablet Take 1 Tab by mouth every four (4) hours as needed. Max Daily Amount: 6 Tabs. (Patient not taking: Reported on 3/9/2022) 10 Tab 0     No Known Allergies  Social History     Socioeconomic History    Marital status:      Spouse name: Not on file    Number of children: Not on file    Years of education: Not on file    Highest education level: Not on file   Occupational History    Not on file   Tobacco Use    Smoking status: Never Smoker    Smokeless tobacco: Never Used   Vaping Use    Vaping Use: Never used   Substance and Sexual Activity    Alcohol use: No    Drug use: Not on file    Sexual activity: Not on file   Other Topics Concern    Not on file   Social History Narrative    Not on file     Social Determinants of Health     Financial Resource Strain:     Difficulty of Paying Living Expenses: Not on file   Food Insecurity:     Worried About [address] in the Last Year: Not on file    [place] of Food in the Last Year: Not on file   Transportation Needs:     Lack of Transportation (Medical): Not on file    Lack of Transportation (Non-Medical):  Not on file   Physical Activity:     Days of Exercise per Week: Not on file    Minutes of Exercise per Session: Not on file   Stress:     Feeling of Stress : Not on file Social Connections:     Frequency of Communication with Friends and Family: Not on file    Frequency of Social Gatherings with Friends and Family: Not on file    Attends [employer] Services: Not on file    Active Member of Clubs or Organizations: Not on file    Attends Club or Organization Meetings: Not on file    Marital Status: Not on file   Intimate Partner Violence:     Fear of Current or Ex-Partner: Not on file    Emotionally Abused: Not on file    Physically Abused: Not on file    Sexually Abused: Not on file   Housing Stability:     Unable to Pay for Housing in the Last Year: Not on file    Number of [place] in the Last Year: Not on file    Unstable Housing in the Last Year: Not on file     History reviewed. No pertinent family history. Impression and Plan:  Ashlyn Garay is a 77 y.o. female with iliac vein stents    RTC in 1 year with repeat IVC duplex    We reviewed the plan with the patient and the patient understands.         Kristian Guzman MD

## 2022-03-09 NOTE — PROGRESS NOTES
1. Have you been to an emergency room or urgent care clinic since your last visit? No    Hospitalized since your last visit? If yes, where, when, and reason for visit? No  2. Have you seen or consulted any other health care providers outside of the [facility] since your last visit including any procedures, health maintenance items. If yes, where, when and reason for visit?  No

## 2022-03-19 PROBLEM — I87.2 CHRONIC VENOUS INSUFFICIENCY: Status: ACTIVE | Noted: 2018-01-24

## 2022-03-19 PROBLEM — R91.8 RIGHT LOWER LOBE LUNG MASS: Status: ACTIVE | Noted: 2018-01-09

## 2022-03-19 PROBLEM — I26.99 PULMONARY EMBOLI (HCC): Status: ACTIVE | Noted: 2018-01-09

## 2022-03-19 PROBLEM — I87.303 CHRONIC VENOUS HYPERTENSION INVOLVING BOTH SIDES: Status: ACTIVE | Noted: 2018-01-24

## 2022-03-19 PROBLEM — I82.402 ACUTE DEEP VEIN THROMBOSIS (DVT) OF LEFT LOWER EXTREMITY (HCC): Status: ACTIVE | Noted: 2018-01-09

## 2022-03-20 PROBLEM — I82.422 ACUTE DEEP VEIN THROMBOSIS (DVT) OF ILIAC VEIN OF LEFT LOWER EXTREMITY (HCC): Status: ACTIVE | Noted: 2018-01-24

## 2022-03-20 PROBLEM — I26.99 PULMONARY EMBOLUS (HCC): Status: ACTIVE | Noted: 2018-01-24

## 2022-06-01 ENCOUNTER — TRANSCRIBE ORDER (OUTPATIENT)
Dept: SCHEDULING | Age: 77
End: 2022-06-01

## 2022-06-01 DIAGNOSIS — Z13.820 SPECIAL SCREENING FOR OSTEOPOROSIS: Primary | ICD-10-CM

## 2022-06-01 DIAGNOSIS — Z12.31 VISIT FOR SCREENING MAMMOGRAM: Primary | ICD-10-CM

## 2022-06-15 ENCOUNTER — HOSPITAL ENCOUNTER (OUTPATIENT)
Dept: MAMMOGRAPHY | Age: 77
Discharge: HOME | End: 2022-06-15
Attending: FAMILY MEDICINE
Payer: MEDICARE

## 2022-06-15 DIAGNOSIS — Z12.31 VISIT FOR SCREENING MAMMOGRAM: ICD-10-CM

## 2022-06-15 PROCEDURE — 77063 BREAST TOMOSYNTHESIS BI: CPT

## 2022-09-14 ENCOUNTER — HOSPITAL ENCOUNTER (EMERGENCY)
Age: 77
Discharge: HOME | End: 2022-09-14
Attending: EMERGENCY MEDICINE
Payer: MEDICARE

## 2022-09-14 ENCOUNTER — APPOINTMENT (OUTPATIENT)
Dept: VASCULAR SURGERY | Age: 77
End: 2022-09-14
Attending: PHYSICIAN ASSISTANT
Payer: MEDICARE

## 2022-09-14 VITALS
HEART RATE: 81 BPM | RESPIRATION RATE: 16 BRPM | SYSTOLIC BLOOD PRESSURE: 161 MMHG | DIASTOLIC BLOOD PRESSURE: 73 MMHG | TEMPERATURE: 98 F | OXYGEN SATURATION: 94 %

## 2022-09-14 DIAGNOSIS — E87.6 HYPOKALEMIA: ICD-10-CM

## 2022-09-14 DIAGNOSIS — I80.9 THROMBOPHLEBITIS: Primary | ICD-10-CM

## 2022-09-14 LAB
ALBUMIN SERPL-MCNC: 3.7 G/DL (ref 3.4–5)
ALBUMIN/GLOB SERPL: 0.7 {RATIO} (ref 0.8–1.7)
ALP SERPL-CCNC: 102 U/L (ref 45–117)
ALT SERPL-CCNC: 32 U/L (ref 13–56)
ANION GAP SERPL CALC-SCNC: 3 MMOL/L (ref 3–18)
AST SERPL-CCNC: 27 U/L (ref 10–38)
BASOPHILS # BLD: 0 K/UL (ref 0–0.1)
BASOPHILS NFR BLD: 1 % (ref 0–2)
BILIRUB SERPL-MCNC: 0.6 MG/DL (ref 0.2–1)
BUN SERPL-MCNC: 11 MG/DL (ref 7–18)
BUN/CREAT SERPL: 14 (ref 12–20)
CALCIUM SERPL-MCNC: 9.5 MG/DL (ref 8.5–10.1)
CHLORIDE SERPL-SCNC: 103 MMOL/L (ref 100–111)
CO2 SERPL-SCNC: 32 MMOL/L (ref 21–32)
CREAT SERPL-MCNC: 0.76 MG/DL (ref 0.6–1.3)
DIFFERENTIAL METHOD BLD: ABNORMAL
EOSINOPHIL # BLD: 0 K/UL (ref 0–0.4)
EOSINOPHIL NFR BLD: 1 % (ref 0–5)
ERYTHROCYTE [DISTWIDTH] IN BLOOD BY AUTOMATED COUNT: 18 % (ref 11.6–14.5)
GLOBULIN SER CALC-MCNC: 5.1 G/DL (ref 2–4)
GLUCOSE SERPL-MCNC: 100 MG/DL (ref 74–99)
HCT VFR BLD AUTO: 40.1 % (ref 35–45)
HGB BLD-MCNC: 12.4 G/DL (ref 12–16)
IMM GRANULOCYTES # BLD AUTO: 0 K/UL (ref 0–0.04)
IMM GRANULOCYTES NFR BLD AUTO: 0 % (ref 0–0.5)
LYMPHOCYTES # BLD: 1.5 K/UL (ref 0.9–3.6)
LYMPHOCYTES NFR BLD: 35 % (ref 21–52)
MCH RBC QN AUTO: 24.1 PG (ref 24–34)
MCHC RBC AUTO-ENTMCNC: 30.9 G/DL (ref 31–37)
MCV RBC AUTO: 77.9 FL (ref 78–100)
MONOCYTES # BLD: 0.5 K/UL (ref 0.05–1.2)
MONOCYTES NFR BLD: 12 % (ref 3–10)
NEUTS SEG # BLD: 2.2 K/UL (ref 1.8–8)
NEUTS SEG NFR BLD: 52 % (ref 40–73)
NRBC # BLD: 0 K/UL (ref 0–0.01)
NRBC BLD-RTO: 0 PER 100 WBC
PLATELET # BLD AUTO: 314 K/UL (ref 135–420)
PMV BLD AUTO: 9.3 FL (ref 9.2–11.8)
POTASSIUM SERPL-SCNC: 3.4 MMOL/L (ref 3.5–5.5)
PROT SERPL-MCNC: 8.8 G/DL (ref 6.4–8.2)
RBC # BLD AUTO: 5.15 M/UL (ref 4.2–5.3)
SODIUM SERPL-SCNC: 138 MMOL/L (ref 136–145)
WBC # BLD AUTO: 4.3 K/UL (ref 4.6–13.2)

## 2022-09-14 PROCEDURE — 99284 EMERGENCY DEPT VISIT MOD MDM: CPT

## 2022-09-14 PROCEDURE — 85025 COMPLETE CBC W/AUTO DIFF WBC: CPT

## 2022-09-14 PROCEDURE — 93971 EXTREMITY STUDY: CPT

## 2022-09-14 PROCEDURE — 80053 COMPREHEN METABOLIC PANEL: CPT

## 2022-09-14 RX ORDER — POTASSIUM CHLORIDE 20 MEQ/1
20 TABLET, EXTENDED RELEASE ORAL 3 TIMES DAILY
Qty: 9 TABLET | Refills: 0 | Status: SHIPPED | OUTPATIENT
Start: 2022-09-14 | End: 2022-09-14

## 2022-09-14 RX ORDER — ACETAMINOPHEN 500 MG
1000 TABLET ORAL
Status: DISCONTINUED | OUTPATIENT
Start: 2022-09-14 | End: 2022-09-14 | Stop reason: HOSPADM

## 2022-09-14 RX ORDER — POTASSIUM CHLORIDE 20 MEQ/1
20 TABLET, EXTENDED RELEASE ORAL 3 TIMES DAILY
Qty: 9 TABLET | Refills: 0 | Status: SHIPPED | OUTPATIENT
Start: 2022-09-14 | End: 2022-09-17

## 2022-09-14 NOTE — ED PROVIDER NOTES
EMERGENCY DEPARTMENT HISTORY AND PHYSICAL EXAM    12:33 PM      Date: 9/14/2022  Patient Name: Alejandro Majano    History of Presenting Illness     Chief Complaint   Patient presents with    Leg Pain         History Provided By: Patient    Additional History (Context): Alejandro Majano is a 77 y.o. female with  hx of chronic venous insufficiency, DVT on Eliquis, and other noted PMH  who presents with complaint of right lower leg pain intermittently x 1 week. Patient's pain is worse with palpation, denies alleviating factors. Patient did not take any medication for the symptoms prior to arrival.  Patient notes she had a bus ride to [place] on Saturday. Patient denies chest pain, dyspnea, palpitations, leg swelling or discoloration, recent surgery, fall or trauma. Notes she is been compliant with her Eliquis 5 mg twice daily. PCP: Negar Mcintosh MD    Current Facility-Administered Medications   Medication Dose Route Frequency Provider Last Rate Last Admin    acetaminophen (TYLENOL) tablet 1,000 mg  1,000 mg Oral NOW [facility] [place]         Current Outpatient Medications   Medication Sig Dispense Refill    potassium chloride (K-DUR, KLOR-CON M20) 20 mEq tablet Take 1 Tablet by mouth three (3) times daily for 3 days. 9 Tablet 0    hydroCHLOROthiazide (MICROZIDE) 12.5 mg capsule Take 12.5 mg by mouth daily. LACTOSE-REDUCED FOOD (ENSURE ACTIVE PROTEIN-MUSCLE PO) Take  by mouth. apixaban (ELIQUIS) 5 mg tablet Take 1 Tab by mouth two (2) times a day. Indications: pulmonary thromboembolism 60 Tab 0    apixaban (ELIQUIS) 5 mg tablet Take 2 Tabs by mouth two (2) times a day. Indications: pulmonary thromboembolism 13 Tab 0    oxyCODONE-acetaminophen (PERCOCET) 5-325 mg per tablet Take 1 Tab by mouth every four (4) hours as needed. Max Daily Amount: 6 Tabs. (Patient not taking: Reported on 3/9/2022) 10 Tab 0    amLODIPine (NORVASC) 5 mg tablet Take 5 mg by mouth daily.       ergocalciferol (VITAMIN D2) 50,000 unit capsule Take 50,000 Units by mouth every seven (7) days. Past History     Past Medical History:  Past Medical History:   Diagnosis Date    Essential hypertension, benign     Nonspecific abnormal electrocardiogram (ECG) (EKG)     Undiagnosed cardiac murmurs     Unspecified congenital defect of septal closure        Past Surgical History:  Past Surgical History:   Procedure Laterality Date    VASCULAR SURGERY PROCEDURE UNLIST         Family History:  No family history on file. Social History:  Social History     Tobacco Use    Smoking status: Never    Smokeless tobacco: Never   Vaping Use    Vaping Use: Never used   Substance Use Topics    Alcohol use: No       Allergies:  No Known Allergies      Review of Systems       Review of Systems   Constitutional:  Negative for chills and fever. Respiratory:  Negative for shortness of breath. Cardiovascular:  Negative for chest pain. Gastrointestinal:  Negative for abdominal pain, nausea and vomiting. Musculoskeletal:  Positive for arthralgias and myalgias. Skin:  Negative for rash. Neurological:  Negative for weakness. All other systems reviewed and are negative. Physical Exam   Visit Vitals  BP (!) 161/73   Pulse 81   Temp 98 °F (36.7 °C)   Resp 16   SpO2 94%         Physical Exam  Vitals and nursing note reviewed. Constitutional:       General: She is not in acute distress. Appearance: Normal appearance. She is well-developed. She is not ill-appearing, toxic-appearing or diaphoretic. HENT:      Head: Normocephalic and atraumatic. Cardiovascular:      Rate and Rhythm: Normal rate and regular rhythm. Heart sounds: Normal heart sounds. No murmur heard. No friction rub. No gallop. Pulmonary:      Effort: Pulmonary effort is normal. No respiratory distress. Breath sounds: Normal breath sounds. No wheezing or rales. Musculoskeletal:         General: Normal range of motion.       Cervical back: Normal range of motion and neck supple. Right lower leg: Tenderness (lateral aspect of lower leg TTP) present. No swelling or deformity. No edema. Comments: No erythema or discoloration to the leg, dorsalis pedis 2+. Skin:     General: Skin is warm. Findings: No rash. Neurological:      Mental Status: She is alert. Diagnostic Study Results     Labs -  Recent Results (from the past 12 hour(s))   CBC WITH AUTOMATED DIFF    Collection Time: 09/14/22 10:55 AM   Result Value Ref Range    WBC 4.3 (L) 4.6 - 13.2 K/uL    RBC 5.15 4.20 - 5.30 M/uL    HGB 12.4 12.0 - 16.0 g/dL    HCT 40.1 35.0 - 45.0 %    MCV 77.9 (L) 78.0 - 100.0 FL    MCH 24.1 24.0 - 34.0 PG    MCHC 30.9 (L) 31.0 - 37.0 g/dL    RDW 18.0 (H) 11.6 - 14.5 %    PLATELET 216 975 - 754 K/uL    MPV 9.3 9.2 - 11.8 FL    NRBC 0.0 0  WBC    ABSOLUTE NRBC 0.00 0.00 - 0.01 K/uL    NEUTROPHILS 52 40 - 73 %    LYMPHOCYTES 35 21 - 52 %    MONOCYTES 12 (H) 3 - 10 %    EOSINOPHILS 1 0 - 5 %    BASOPHILS 1 0 - 2 %    IMMATURE GRANULOCYTES 0 0.0 - 0.5 %    ABS. NEUTROPHILS 2.2 1.8 - 8.0 K/UL    ABS. LYMPHOCYTES 1.5 0.9 - 3.6 K/UL    ABS. MONOCYTES 0.5 0.05 - 1.2 K/UL    ABS. EOSINOPHILS 0.0 0.0 - 0.4 K/UL    ABS. BASOPHILS 0.0 0.0 - 0.1 K/UL    ABS. IMM. GRANS. 0.0 0.00 - 0.04 K/UL    DF AUTOMATED     METABOLIC PANEL, COMPREHENSIVE    Collection Time: 09/14/22 10:55 AM   Result Value Ref Range    Sodium 138 136 - 145 mmol/L    Potassium 3.4 (L) 3.5 - 5.5 mmol/L    Chloride 103 100 - 111 mmol/L    CO2 32 21 - 32 mmol/L    Anion gap 3 3.0 - 18 mmol/L    Glucose 100 (H) 74 - 99 mg/dL    BUN 11 7.0 - 18 MG/DL    Creatinine 0.76 0.6 - 1.3 MG/DL    BUN/Creatinine ratio 14 12 - 20      GFR est AA >60 >60 ml/min/1.73m2    GFR est non-AA >60 >60 ml/min/1.73m2    Calcium 9.5 8.5 - 10.1 MG/DL    Bilirubin, total 0.6 0.2 - 1.0 MG/DL    ALT (SGPT) 32 13 - 56 U/L    AST (SGOT) 27 10 - 38 U/L    Alk.  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 No evidence of deep vein thrombosis within the right lower extremity. Medical Decision Making   I am the first provider for this patient. I reviewed the vital signs, available nursing notes, past medical history, past surgical history, family history and social history. Vital Signs-Reviewed the patient's vital signs. Records Reviewed: Nursing Notes and Old Medical Records (Time of Review: 12:33 PM)    ED Course: Progress Notes, Reevaluation, and Consults:  1:45 PM: Reviewed results and plan with patient. Discussed need for close outpatient follow-up this week for reassessment. Discussed strict return precautions, including chest pain, shortness of breath, or any other medical concerns. Provider Notes (Medical Decision Making): 81-year-old female with history of DVT on Eliquis who presents to the ED due to right lower leg pain. Extremity neurovascularly intact. No ecchymosis, edema, deformity. Labs demonstrate mild hypokalemia. Ultrasound demonstrates no evidence of DVT, does demonstrate chronic superficial thrombophlebitis. Patient stable for discharge with continued use of Eliquis, close outpatient follow-up for further assessment. Diagnosis     Clinical Impression:   1. Thrombophlebitis    2.  Hypokalemia        Disposition: home     Follow-up Information       Follow up With Specialties Details Why [address] [facility] EMERGENCY DEPT Emergency Medicine  If symptoms worsen [address]  [phone]    Madelyn Hatch MD Family Medicine Schedule an appointment as soon as possible for a visit   [address]  [phone]               Current Discharge Medication List        START taking these medications    Details   potassium chloride (K-DUR, KLOR-CON M20) 20 mEq tablet Take 1 Tablet by mouth three (3) times daily for 3 days. Qty: 9 Tablet, Refills: 0           CONTINUE these medications which have NOT CHANGED    Details   hydroCHLOROthiazide (MICROZIDE) 12.5 mg capsule Take 12.5 mg by mouth daily. LACTOSE-REDUCED FOOD (ENSURE ACTIVE PROTEIN-MUSCLE PO) Take  by mouth. !! apixaban (ELIQUIS) 5 mg tablet Take 1 Tab by mouth two (2) times a day. Indications: pulmonary thromboembolism  Qty: 60 Tab, Refills: 0      !! apixaban (ELIQUIS) 5 mg tablet Take 2 Tabs by mouth two (2) times a day. Indications: pulmonary thromboembolism  Qty: 13 Tab, Refills: 0      oxyCODONE-acetaminophen (PERCOCET) 5-325 mg per tablet Take 1 Tab by mouth every four (4) hours as needed. Max Daily Amount: 6 Tabs. Qty: 10 Tab, Refills: 0    Associated Diagnoses: Other acute pulmonary embolism without acute cor pulmonale (HCC)      amLODIPine (NORVASC) 5 mg tablet Take 5 mg by mouth daily. ergocalciferol (VITAMIN D2) 50,000 unit capsule Take 50,000 Units by mouth every seven (7) days. !! - Potential duplicate medications found. Please discuss with provider. Dictation disclaimer:  Please note that this dictation was completed with ATEME, the [employer] voice recognition software. Quite often unanticipated grammatical, syntax, homophones, and other interpretive errors are inadvertently transcribed by the computer software. Please disregard these errors. Please excuse any errors that have escaped final proofreading.

## 2023-02-07 DIAGNOSIS — I82.402 ACUTE DEEP VEIN THROMBOSIS (DVT) OF LEFT LOWER EXTREMITY (HCC): Primary | ICD-10-CM

## 2023-02-07 DIAGNOSIS — I74.5 THROMBOEMBOLISM OF ILIAC ARTERY (HCC): ICD-10-CM

## 2023-03-08 ENCOUNTER — OFFICE VISIT (OUTPATIENT)
Age: 78
End: 2023-03-08
Payer: MEDICARE

## 2023-03-08 VITALS
SYSTOLIC BLOOD PRESSURE: 132 MMHG | OXYGEN SATURATION: 99 % | DIASTOLIC BLOOD PRESSURE: 82 MMHG | HEART RATE: 74 BPM | BODY MASS INDEX: 20.19 KG/M2 | HEIGHT: 72 IN | WEIGHT: 149.03 LBS

## 2023-03-08 DIAGNOSIS — I26.99 PULMONARY EMBOLISM, UNSPECIFIED CHRONICITY, UNSPECIFIED PULMONARY EMBOLISM TYPE, UNSPECIFIED WHETHER ACUTE COR PULMONALE PRESENT (HCC): Primary | ICD-10-CM

## 2023-03-08 PROCEDURE — G8420 CALC BMI NORM PARAMETERS: HCPCS | Performed by: STUDENT IN AN ORGANIZED HEALTH CARE EDUCATION/TRAINING PROGRAM

## 2023-03-08 PROCEDURE — 1090F PRES/ABSN URINE INCON ASSESS: CPT | Performed by: STUDENT IN AN ORGANIZED HEALTH CARE EDUCATION/TRAINING PROGRAM

## 2023-03-08 PROCEDURE — 1123F ACP DISCUSS/DSCN MKR DOCD: CPT | Performed by: STUDENT IN AN ORGANIZED HEALTH CARE EDUCATION/TRAINING PROGRAM

## 2023-03-08 PROCEDURE — G8484 FLU IMMUNIZE NO ADMIN: HCPCS | Performed by: STUDENT IN AN ORGANIZED HEALTH CARE EDUCATION/TRAINING PROGRAM

## 2023-03-08 PROCEDURE — G8428 CUR MEDS NOT DOCUMENT: HCPCS | Performed by: STUDENT IN AN ORGANIZED HEALTH CARE EDUCATION/TRAINING PROGRAM

## 2023-03-08 PROCEDURE — 1036F TOBACCO NON-USER: CPT | Performed by: STUDENT IN AN ORGANIZED HEALTH CARE EDUCATION/TRAINING PROGRAM

## 2023-03-08 PROCEDURE — G8400 PT W/DXA NO RESULTS DOC: HCPCS | Performed by: STUDENT IN AN ORGANIZED HEALTH CARE EDUCATION/TRAINING PROGRAM

## 2023-03-08 PROCEDURE — 99214 OFFICE O/P EST MOD 30 MIN: CPT | Performed by: STUDENT IN AN ORGANIZED HEALTH CARE EDUCATION/TRAINING PROGRAM

## 2023-03-08 ASSESSMENT — PATIENT HEALTH QUESTIONNAIRE - PHQ9
SUM OF ALL RESPONSES TO PHQ QUESTIONS 1-9: 0
SUM OF ALL RESPONSES TO PHQ9 QUESTIONS 1 & 2: 0
1. LITTLE INTEREST OR PLEASURE IN DOING THINGS: 0
SUM OF ALL RESPONSES TO PHQ QUESTIONS 1-9: 0
2. FEELING DOWN, DEPRESSED OR HOPELESS: 0

## 2023-03-08 NOTE — PROGRESS NOTES
History and Physical    Bhargav Winston is a 69 y.o. 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 Patient has remote history of bilateral iliac vein stenting undergoing annual surveillance. Patient denies any bilateral lower extremity swelling or any symptoms of leg pain. Patient has been compliant with compression therapy. Patient denies any shortness of breath, chest pain, nausea vomiting, no abdominal pain, no symptoms of stroke. Physical exam is normal with palpable pedal and radial pulses. Physical Exam:    /82 (Site: Right Upper Arm, Position: Sitting)   Pulse 74   Ht 6' (1.829 m)   Wt 149 lb 0.5 oz (67.6 kg)   SpO2 99%   BMI 20.21 kg/m²      Impression and Plan:  Bhargav Winston is a 69 y.o. female with DVT/PE and iliac vein stenting    - Annual surveillance with iliocaval duplex  - RTC in 1 year    We reviewed the plan with the patient and the patient understands. No follow-up provider specified.     Phares Ormond, MD            Past Medical History:   Diagnosis Date    Essential hypertension, benign     Nonspecific abnormal electrocardiogram (ECG) (EKG)     Undiagnosed cardiac murmurs     Unspecified congenital defect of septal closure      Past Surgical History:   Procedure Laterality Date    VASCULAR SURGERY       Patient Active Problem List   Diagnosis    Chronic venous hypertension involving both sides    Chronic venous insufficiency    Acute deep vein thrombosis (DVT) of left lower extremity (HCC)    Right lower lobe lung mass    Pulmonary emboli (HCC)    Acute deep vein thrombosis (DVT) of iliac vein of left lower extremity (HCC)    Pulmonary embolus (HCC)     Current Outpatient Medications   Medication Sig Dispense Refill    amLODIPine (NORVASC) 5 MG tablet Take 5 mg by mouth daily      apixaban (ELIQUIS) 5 MG TABS tablet Take 5 mg by mouth 2 times daily      ergocalciferol (ERGOCALCIFEROL) 1.25 MG (57743 UT) capsule Take 50,000 Units by mouth every 7 days      hydroCHLOROthiazide (MICROZIDE) 12.5 MG capsule Take 12.5 mg by mouth daily      oxyCODONE-acetaminophen (PERCOCET) 5-325 MG per tablet Take 1 tablet by mouth every 4 hours as needed. No current facility-administered medications for this visit. No Known Allergies  Social History     Socioeconomic History    Marital status:      Spouse name: Not on file    Number of children: Not on file    Years of education: Not on file    Highest education level: Not on file   Occupational History    Not on file   Tobacco Use    Smoking status: Never    Smokeless tobacco: Never   Substance and Sexual Activity    Alcohol use: No    Drug use: Not on file    Sexual activity: Not on file   Other Topics Concern    Not on file   Social History Narrative    Not on file     Social Determinants of Health     Financial Resource Strain: Not on file   Food Insecurity: Not on file   Transportation Needs: Not on file   Physical Activity: Not on file   Stress: Not on file   Social Connections: Not on file   Intimate Partner Violence: Not on file   Housing Stability: Not on file     History reviewed. No pertinent family history.

## 2023-03-08 NOTE — PROGRESS NOTES
1. Have you been to the ER, urgent care clinic since your last visit? No      Hospitalized since your last visit? No     2. Have you seen or consulted any other health care providers outside of the [address] [place] [address] since your last visit? Include any pap smears or colon screening.   No

## 2023-03-17 ENCOUNTER — HOSPITAL ENCOUNTER (EMERGENCY)
Facility: HOSPITAL | Age: 78
Discharge: HOME | End: 2023-03-17
Attending: EMERGENCY MEDICINE
Payer: MEDICARE

## 2023-03-17 ENCOUNTER — APPOINTMENT (OUTPATIENT)
Facility: HOSPITAL | Age: 78
End: 2023-03-17
Payer: MEDICARE

## 2023-03-17 VITALS
HEART RATE: 100 BPM | SYSTOLIC BLOOD PRESSURE: 115 MMHG | OXYGEN SATURATION: 97 % | WEIGHT: 145 LBS | TEMPERATURE: 97.9 F | BODY MASS INDEX: 20.76 KG/M2 | HEIGHT: 70 IN | RESPIRATION RATE: 20 BRPM | DIASTOLIC BLOOD PRESSURE: 102 MMHG

## 2023-03-17 DIAGNOSIS — S70.02XA CONTUSION OF LEFT HIP, INITIAL ENCOUNTER: Primary | ICD-10-CM

## 2023-03-17 DIAGNOSIS — I10 ESSENTIAL HYPERTENSION: ICD-10-CM

## 2023-03-17 PROCEDURE — 73552 X-RAY EXAM OF FEMUR 2/>: CPT

## 2023-03-17 PROCEDURE — 99284 EMERGENCY DEPT VISIT MOD MDM: CPT

## 2023-03-17 PROCEDURE — 93005 ELECTROCARDIOGRAM TRACING: CPT | Performed by: EMERGENCY MEDICINE

## 2023-03-17 RX ORDER — AMLODIPINE BESYLATE 5 MG/1
5 TABLET ORAL DAILY
Qty: 30 TABLET | Refills: 0 | Status: SHIPPED | OUTPATIENT
Start: 2023-03-17 | End: 2023-04-16

## 2023-03-17 RX ORDER — HYDROCHLOROTHIAZIDE 12.5 MG/1
12.5 CAPSULE, GELATIN COATED ORAL DAILY
Qty: 30 CAPSULE | Refills: 0 | Status: SHIPPED | OUTPATIENT
Start: 2023-03-17 | End: 2023-04-16

## 2023-03-17 ASSESSMENT — PAIN SCALES - GENERAL: PAINLEVEL_OUTOF10: 7

## 2023-03-17 ASSESSMENT — PAIN - FUNCTIONAL ASSESSMENT: PAIN_FUNCTIONAL_ASSESSMENT: 0-10

## 2023-03-17 NOTE — ED PROVIDER NOTES
EMERGENCY DEPARTMENT HISTORY AND PHYSICAL EXAM    Date: 3/17/2023  Patient Name: Barbra Proctor    History of Presenting Illness     Chief Complaint:   Chief Complaint   Patient presents with    Fall    Hip Pain     Patient states that she rolled out of her bed on Saturday evening and has had left hip pain since     History Provided By: patient    Additional History (Context): Barbra Proctor is a 69 y.o. female presents ambulatory to ED for evaluation of left hip injury that occurred 7 days ago, she rolled out of her bed and landed on left hip, has pain since and is worried about hip fracture. Reports being ambulatory since. Denies any B/B problems, abdominal pains, back pains, clicking, crepitus, fevers, weakness, numbness, tingling in extremities. Also, requests BP meds refill, amlodipine 5 mg and HCTZ 12.5 mg as she is running low and her next PCP appointment is not until September. She contacted her PCP but has not heard back from them yet. Denies headaches, blurred vision, CP, palp's, SOB, dizziness, or other complaints. PCP: Camila Avila MD    No current facility-administered medications for this encounter. Current Outpatient Medications   Medication Sig Dispense Refill    amLODIPine (NORVASC) 5 MG tablet Take 1 tablet by mouth daily 30 tablet 0    hydroCHLOROthiazide (MICROZIDE) 12.5 MG capsule Take 1 capsule by mouth daily 30 capsule 0    apixaban (ELIQUIS) 5 MG TABS tablet Take 5 mg by mouth 2 times daily      ergocalciferol (ERGOCALCIFEROL) 1.25 MG (90393 UT) capsule Take 50,000 Units by mouth every 7 days      oxyCODONE-acetaminophen (PERCOCET) 5-325 MG per tablet Take 1 tablet by mouth every 4 hours as needed.          Past History     Past Medical History:  Past Medical History:   Diagnosis Date    Essential hypertension, benign     Nonspecific abnormal electrocardiogram (ECG) (EKG)     Undiagnosed cardiac murmurs     Unspecified congenital defect of septal closure        Past Surgical History:  Past Surgical History:   Procedure Laterality Date    VASCULAR SURGERY         Family History:  No family history on file. Social History:  Social History     Tobacco Use    Smoking status: Never    Smokeless tobacco: Never   Substance Use Topics    Alcohol use: No       Allergies:  No Known Allergies      Review of Systems   Review of Systems  All Other Systems Negative  10 point review of systems otherwise negative unless noted in HPI. Physical Exam     Vitals:    03/17/23 1404   BP: (!) 115/102   Pulse: (!) 123   Resp: 20   Temp: 97.9 °F (36.6 °C)   TempSrc: Oral   SpO2: 96%   Weight: 145 lb (65.8 kg)   Height: 5' 10\" (1.778 m)     Physical Exam  Vitals and nursing note reviewed. Constitutional:       General: She is not in acute distress. HENT:      Head: Normocephalic and atraumatic. Mouth/Throat:      Mouth: Mucous membranes are moist.   Eyes:      Conjunctiva/sclera: Conjunctivae normal.      Pupils: Pupils are equal, round, and reactive to light. Neck:      Vascular: No carotid bruit. Comments: No JVD  Cardiovascular:      Rate and Rhythm: Normal rate and regular rhythm. Pulses: Normal pulses. Heart sounds: Normal heart sounds. Comments:   Pulmonary:      Effort: Pulmonary effort is normal.   Abdominal:      Palpations: Abdomen is soft. Tenderness: There is no abdominal tenderness. Musculoskeletal:      Right lower leg: No edema. Left lower leg: No edema. Comments: FROM at L hip with reported discomfort, no clicking or crepitus, no skin changes, no bony instability, no asymmetry on comparison with R hip. Normal exam of BLEs. NVI. Grossly normal exam of T-and L-spine. Neurological:      General: No focal deficit present. Mental Status: She is alert. Sensory: No sensory deficit. Motor: No weakness.       Coordination: Coordination normal.      Gait: Gait normal.      Deep Tendon Reflexes: Reflexes normal.   Psychiatric: Mood and Affect: Mood normal.            Diagnostic Study Results     Labs -     Recent Results (from the past 12 hour(s))   EKG 12 Lead    Collection Time: 03/17/23  2:15 PM   Result Value Ref Range    Ventricular Rate 114 BPM    Atrial Rate 114 BPM    P-R Interval 138 ms    QRS Duration 78 ms    Q-T Interval 322 ms    QTc Calculation (Bazett) 443 ms    P Axis 76 degrees    R Axis 22 degrees    T Axis 76 degrees    Diagnosis Sinus tachycardia        Radiologic Studies -   XR FEMUR LEFT (MIN 2 VIEWS)   Final Result      No acute radiographic findings. If symptoms persist consider further imaging. Medical Decision Making   I am the first provider for this patient. I reviewed the vital signs, available nursing notes, past medical history, past surgical history, family history and social history. Vital Signs-Reviewed the patient's vital signs. Records Reviewed: nursing notes    Procedures:  Procedures    Provider Notes (Medical Decision Making):     L hip contusion 7 days ago, neg xray, ambulatory wo difficulty, reassured pt, advised supportive care. Patient is very happy with x-ray results and is reassured now. We will follow-up with PCP as needed on that. Tachycardia on arrival, pt admits to anxiety about poss hip fx but is reassured, repeat HR is 100. Had EKG done in triage d/t  on arrival, EKG shows tachycardia 114, pt denies palps, CP, SOB, dizziness or any other s/s. HR improved after reassurance. Heart rate is attributed to anxiety. Patient also requested BP meds refill as she is running low on HCTZ and just ran out of amlodipine. She has regular checkups with her PCP. Provided courtesy refill for both meds, patient will follow-up with PCP to ensure continuous refills. Discussed results, care in ED and further care, f/u and s/s warranting return to ED. Pt understood and agreed to plan.       MED RECONCILIATION:  No current facility-administered medications for this encounter. Current Outpatient Medications   Medication Sig    amLODIPine (NORVASC) 5 MG tablet Take 1 tablet by mouth daily    hydroCHLOROthiazide (MICROZIDE) 12.5 MG capsule Take 1 capsule by mouth daily    apixaban (ELIQUIS) 5 MG TABS tablet Take 5 mg by mouth 2 times daily    ergocalciferol (ERGOCALCIFEROL) 1.25 MG (87762 UT) capsule Take 50,000 Units by mouth every 7 days    oxyCODONE-acetaminophen (PERCOCET) 5-325 MG per tablet Take 1 tablet by mouth every 4 hours as needed. Disposition:  home    DISCHARGE NOTE:     Patient has no new complaints, changes, or physical findings. Care plan outlined and precautions discussed. Results of EKG and L hip xray  were reviewed with the patient. All medications were reviewed with the patient; will d/c home. All of pt's questions and concerns were addressed. Patient was instructed and agrees to follow up with PCP, as well as to return to the ED upon further deterioration. Patient is ready to go home. Medication List        CONTINUE taking these medications      amLODIPine 5 MG tablet  Commonly known as: NORVASC  Take 1 tablet by mouth daily     apixaban 5 MG Tabs tablet  Commonly known as: ELIQUIS     ergocalciferol 1.25 MG (57895 UT) capsule  Commonly known as: ERGOCALCIFEROL     hydroCHLOROthiazide 12.5 MG capsule  Commonly known as: MICROZIDE  Take 1 capsule by mouth daily     oxyCODONE-acetaminophen 5-325 MG per tablet  Commonly known as: PERCOCET               Where to Get Your Medications        These medications were sent to [address] [place] [address] [facility]  [address] [place] [address], [address] [place] [address]      Phone: [phone]   amLODIPine 5 MG tablet  hydroCHLOROthiazide 12.5 MG capsule             Diagnosis     Clinical Impression:   1. Contusion of left hip, initial encounter    2.  Essential hypertension          Dictation disclaimer:  Please note that this dictation was completed with Dragon, the computer voice recognition software. Quite often unanticipated grammatical, syntax, homophones, and other interpretive errors are inadvertently transcribed by the computer software. Please disregard these errors. Please excuse any errors that have escaped final proofreading.         Salty Quintanillama  03/17/23 [id number]

## 2023-03-17 NOTE — ED NOTES
I have reviewed the discharge instructions with the patient. The patient verbalized understanding of instructions.      Woo Finch RN  03/17/23 [id number]

## 2023-03-18 LAB
EKG ATRIAL RATE: 114 BPM
EKG DIAGNOSIS: NORMAL
EKG P AXIS: 76 DEGREES
EKG P-R INTERVAL: 138 MS
EKG Q-T INTERVAL: 322 MS
EKG QRS DURATION: 78 MS
EKG QTC CALCULATION (BAZETT): 443 MS
EKG R AXIS: 22 DEGREES
EKG T AXIS: 76 DEGREES
EKG VENTRICULAR RATE: 114 BPM

## 2023-03-18 PROCEDURE — 93010 ELECTROCARDIOGRAM REPORT: CPT | Performed by: INTERNAL MEDICINE

## 2023-04-18 ENCOUNTER — TELEPHONE (OUTPATIENT)
Age: 78
End: 2023-04-18

## 2023-04-18 ENCOUNTER — OFFICE VISIT (OUTPATIENT)
Age: 78
End: 2023-04-18
Payer: MEDICARE

## 2023-04-18 VITALS
HEART RATE: 128 BPM | SYSTOLIC BLOOD PRESSURE: 137 MMHG | HEIGHT: 70 IN | DIASTOLIC BLOOD PRESSURE: 42 MMHG | WEIGHT: 174 LBS | OXYGEN SATURATION: 98 % | BODY MASS INDEX: 24.91 KG/M2

## 2023-04-18 DIAGNOSIS — I50.9 ACUTE CONGESTIVE HEART FAILURE, UNSPECIFIED HEART FAILURE TYPE (HCC): ICD-10-CM

## 2023-04-18 DIAGNOSIS — Z86.711 HISTORY OF PULMONARY EMBOLISM: ICD-10-CM

## 2023-04-18 DIAGNOSIS — I10 ESSENTIAL HYPERTENSION: Primary | ICD-10-CM

## 2023-04-18 DIAGNOSIS — R42 DIZZINESS: ICD-10-CM

## 2023-04-18 DIAGNOSIS — R01.1 MURMUR, CARDIAC: ICD-10-CM

## 2023-04-18 DIAGNOSIS — R94.31 ABNORMAL EKG: ICD-10-CM

## 2023-04-18 DIAGNOSIS — R00.0 SINUS TACHYCARDIA: ICD-10-CM

## 2023-04-18 DIAGNOSIS — I49.9 IRREGULAR HEART BEAT: Primary | ICD-10-CM

## 2023-04-18 DIAGNOSIS — D68.2 FACTOR II DEFICIENCY (HCC): ICD-10-CM

## 2023-04-18 DIAGNOSIS — I10 ESSENTIAL HYPERTENSION: ICD-10-CM

## 2023-04-18 PROCEDURE — G8400 PT W/DXA NO RESULTS DOC: HCPCS | Performed by: INTERNAL MEDICINE

## 2023-04-18 PROCEDURE — 1123F ACP DISCUSS/DSCN MKR DOCD: CPT | Performed by: INTERNAL MEDICINE

## 2023-04-18 PROCEDURE — 1090F PRES/ABSN URINE INCON ASSESS: CPT | Performed by: INTERNAL MEDICINE

## 2023-04-18 PROCEDURE — G8420 CALC BMI NORM PARAMETERS: HCPCS | Performed by: INTERNAL MEDICINE

## 2023-04-18 PROCEDURE — 93000 ELECTROCARDIOGRAM COMPLETE: CPT | Performed by: INTERNAL MEDICINE

## 2023-04-18 PROCEDURE — 99205 OFFICE O/P NEW HI 60 MIN: CPT | Performed by: INTERNAL MEDICINE

## 2023-04-18 PROCEDURE — 1036F TOBACCO NON-USER: CPT | Performed by: INTERNAL MEDICINE

## 2023-04-18 PROCEDURE — G8428 CUR MEDS NOT DOCUMENT: HCPCS | Performed by: INTERNAL MEDICINE

## 2023-04-18 PROCEDURE — 3075F SYST BP GE 130 - 139MM HG: CPT | Performed by: INTERNAL MEDICINE

## 2023-04-18 PROCEDURE — 3078F DIAST BP <80 MM HG: CPT | Performed by: INTERNAL MEDICINE

## 2023-04-18 RX ORDER — ACETAMINOPHEN 500 MG
500 TABLET ORAL EVERY 6 HOURS PRN
Status: ON HOLD | COMMUNITY

## 2023-04-18 RX ORDER — HYDROCHLOROTHIAZIDE 12.5 MG/1
12.5 CAPSULE, GELATIN COATED ORAL DAILY
COMMUNITY
End: 2023-04-18 | Stop reason: SINTOL

## 2023-04-18 RX ORDER — METOPROLOL SUCCINATE 25 MG/1
25 TABLET, EXTENDED RELEASE ORAL DAILY
Status: ON HOLD | COMMUNITY

## 2023-04-18 RX ORDER — ROSUVASTATIN CALCIUM 5 MG/1
5 TABLET, COATED ORAL DAILY
Status: ON HOLD | COMMUNITY

## 2023-04-18 RX ORDER — AMLODIPINE BESYLATE 5 MG/1
5 TABLET ORAL DAILY
Status: ON HOLD | COMMUNITY

## 2023-04-18 ASSESSMENT — ENCOUNTER SYMPTOMS
EYES NEGATIVE: 1
SHORTNESS OF BREATH: 1
GASTROINTESTINAL NEGATIVE: 1

## 2023-04-18 NOTE — PROGRESS NOTES
5/2/2023), or if symptoms worsen or fail to improve, for post test/procedure. Return to ER if any significant CP not relieved by s/l NTG, severe SOB, severe palpitations, loss of consciousness    4/18/2023 has had persistent tachycardia apparently at least for a month and is in acute CHF of unclear etiology. Has a murmur of aortic incompetence. LV function needs to be evaluated. EKG shows possible old anterior MI. Will need cardiac work-up but also rule out any acute MI. Labs as ordered including troponin and if it is abnormal, may need to be admitted. 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 We will refer her to Dr. Selma Donnelly but that is an elective referral for now. Get echo and a stress test if troponin is negative. Due to dizziness, discontinue HCTZ as there is no fluid overload and dyspnea is likely related to aortic incompetence which may have been acute. Further plans depending on the findings of the testing.

## 2023-04-18 NOTE — TELEPHONE ENCOUNTER
Patient notified of Dr. Tod Hoffman written orders from lab results. Repeat BMP in 3 months patient advised me she's scheduled for office visit today.

## 2023-04-19 ENCOUNTER — TELEPHONE (OUTPATIENT)
Age: 78
End: 2023-04-19

## 2023-04-19 DIAGNOSIS — N28.9 RENAL INSUFFICIENCY: Primary | ICD-10-CM

## 2023-04-19 DIAGNOSIS — I50.9 ACUTE CONGESTIVE HEART FAILURE, UNSPECIFIED HEART FAILURE TYPE (HCC): ICD-10-CM

## 2023-04-19 LAB
ALBUMIN SERPL-MCNC: 3.1 G/DL (ref 3.7–4.7)
ALP SERPL-CCNC: 87 IU/L (ref 44–121)
ALT SERPL-CCNC: 20 IU/L (ref 0–32)
AST SERPL-CCNC: 33 IU/L (ref 0–40)
BASOPHILS # BLD AUTO: 0.1 X10E3/UL (ref 0–0.2)
BASOPHILS NFR BLD AUTO: 1 %
BILIRUB DIRECT SERPL-MCNC: 0.38 MG/DL (ref 0–0.4)
BILIRUB SERPL-MCNC: 0.7 MG/DL (ref 0–1.2)
BUN SERPL-MCNC: 43 MG/DL (ref 8–27)
BUN/CREAT SERPL: 23 (ref 12–28)
CALCIUM SERPL-MCNC: 8.7 MG/DL (ref 8.7–10.3)
CHLORIDE SERPL-SCNC: 79 MMOL/L (ref 96–106)
CHOLEST SERPL-MCNC: 82 MG/DL (ref 100–199)
CO2 SERPL-SCNC: 24 MMOL/L (ref 20–29)
CREAT SERPL-MCNC: 1.87 MG/DL (ref 0.57–1)
EGFRCR SERPLBLD CKD-EPI 2021: 28 ML/MIN/1.73
EOSINOPHIL # BLD AUTO: 0 X10E3/UL (ref 0–0.4)
EOSINOPHIL NFR BLD AUTO: 0 %
ERYTHROCYTE [DISTWIDTH] IN BLOOD BY AUTOMATED COUNT: 18.5 % (ref 11.7–15.4)
GLUCOSE SERPL-MCNC: 115 MG/DL (ref 70–99)
HCT VFR BLD AUTO: 35.3 % (ref 34–46.6)
HDLC SERPL-MCNC: 12 MG/DL
HGB BLD-MCNC: 11.3 G/DL (ref 11.1–15.9)
IMM GRANULOCYTES # BLD AUTO: 0.1 X10E3/UL (ref 0–0.1)
IMM GRANULOCYTES NFR BLD AUTO: 1 %
LDLC SERPL CALC-MCNC: 40 MG/DL (ref 0–99)
LYMPHOCYTES # BLD AUTO: 1.7 X10E3/UL (ref 0.7–3.1)
LYMPHOCYTES NFR BLD AUTO: 11 %
MCH RBC QN AUTO: 24.5 PG (ref 26.6–33)
MCHC RBC AUTO-ENTMCNC: 32 G/DL (ref 31.5–35.7)
MCV RBC AUTO: 77 FL (ref 79–97)
MONOCYTES # BLD AUTO: 1.6 X10E3/UL (ref 0.1–0.9)
MONOCYTES NFR BLD AUTO: 10 %
NEUTROPHILS # BLD AUTO: 11.9 X10E3/UL (ref 1.4–7)
NEUTROPHILS NFR BLD AUTO: 77 %
PLATELET # BLD AUTO: 167 X10E3/UL (ref 150–450)
POTASSIUM SERPL-SCNC: 3 MMOL/L (ref 3.5–5.2)
PROT SERPL-MCNC: 7.4 G/DL (ref 6–8.5)
RBC # BLD AUTO: 4.61 X10E6/UL (ref 3.77–5.28)
SODIUM SERPL-SCNC: 131 MMOL/L (ref 134–144)
SPECIMEN STATUS REPORT: NORMAL
TRIGL SERPL-MCNC: 175 MG/DL (ref 0–149)
TSH SERPL DL<=0.005 MIU/L-ACNC: 1.91 UIU/ML (ref 0.45–4.5)
VLDLC SERPL CALC-MCNC: 30 MG/DL (ref 5–40)
WBC # BLD AUTO: 15.4 X10E3/UL (ref 3.4–10.8)

## 2023-04-19 NOTE — TELEPHONE ENCOUNTER
Patient dtr Say Scott made aware of patient lab results below. Please contact patient and do the following asap  I have stopped HCTZ in the office. Please make sure patient does not take that and let her know that she has dehydration and renal insufficiency due to HCTZ  Rpt BMP  in 30 days    Per Say Scott will report to office to p/u patient lab order today.      She also confirmed \"mom did d/c HCTZ 4/18/23\"

## 2023-04-19 NOTE — RESULT ENCOUNTER NOTE
Please contact patient and do the following asap  I have stopped HCTZ in the office.   Please make sure patient does not take that and let her know that she has dehydration and renal insufficiency due to HCTZ  Rpt BMP  in 30 days

## 2023-04-20 ENCOUNTER — CLINICAL DOCUMENTATION (OUTPATIENT)
Age: 78
End: 2023-04-20

## 2023-04-20 ENCOUNTER — APPOINTMENT (OUTPATIENT)
Facility: HOSPITAL | Age: 78
End: 2023-04-20
Payer: MEDICARE

## 2023-04-20 ENCOUNTER — HOSPITAL ENCOUNTER (INPATIENT)
Facility: HOSPITAL | Age: 78
LOS: 8 days | Discharge: HOME | End: 2023-04-28
Attending: EMERGENCY MEDICINE
Payer: MEDICARE

## 2023-04-20 ENCOUNTER — TELEPHONE (OUTPATIENT)
Age: 78
End: 2023-04-20

## 2023-04-20 DIAGNOSIS — R00.0 TACHYCARDIA: ICD-10-CM

## 2023-04-20 DIAGNOSIS — R77.8 ELEVATED TROPONIN: ICD-10-CM

## 2023-04-20 DIAGNOSIS — I21.4 NSTEMI (NON-ST ELEVATED MYOCARDIAL INFARCTION) (HCC): Primary | ICD-10-CM

## 2023-04-20 DIAGNOSIS — R53.1 GENERAL WEAKNESS: ICD-10-CM

## 2023-04-20 LAB
ALBUMIN SERPL-MCNC: 3.1 G/DL (ref 3.7–4.7)
ALP SERPL-CCNC: 87 IU/L (ref 44–121)
ALT SERPL-CCNC: 20 IU/L (ref 0–32)
ANION GAP SERPL CALC-SCNC: 6 MMOL/L (ref 3–18)
ANION GAP SERPL CALC-SCNC: 8 MMOL/L (ref 3–18)
APTT PPP: 29.3 SEC (ref 23–36.4)
APTT PPP: 55.3 SEC (ref 23–36.4)
AST SERPL-CCNC: 33 IU/L (ref 0–40)
BASOPHILS # BLD AUTO: 0.1 X10E3/UL (ref 0–0.2)
BASOPHILS # BLD: 0 K/UL (ref 0–0.1)
BASOPHILS NFR BLD AUTO: 1 %
BASOPHILS NFR BLD: 0 % (ref 0–2)
BILIRUB DIRECT SERPL-MCNC: 0.38 MG/DL (ref 0–0.4)
BILIRUB SERPL-MCNC: 0.7 MG/DL (ref 0–1.2)
BUN SERPL-MCNC: 38 MG/DL (ref 7–18)
BUN SERPL-MCNC: 43 MG/DL (ref 7–18)
BUN SERPL-MCNC: 43 MG/DL (ref 8–27)
BUN/CREAT SERPL: 23 (ref 12–28)
BUN/CREAT SERPL: 31 (ref 12–20)
BUN/CREAT SERPL: 34 (ref 12–20)
CALCIUM SERPL-MCNC: 8.7 MG/DL (ref 8.5–10.1)
CALCIUM SERPL-MCNC: 8.7 MG/DL (ref 8.7–10.3)
CALCIUM SERPL-MCNC: 9 MG/DL (ref 8.5–10.1)
CHLORIDE SERPL-SCNC: 79 MMOL/L (ref 96–106)
CHLORIDE SERPL-SCNC: 86 MMOL/L (ref 100–111)
CHLORIDE SERPL-SCNC: 90 MMOL/L (ref 100–111)
CHOLEST SERPL-MCNC: 82 MG/DL (ref 100–199)
CK SERPL-CCNC: 185 U/L (ref 26–192)
CO2 SERPL-SCNC: 24 MMOL/L (ref 20–29)
CO2 SERPL-SCNC: 33 MMOL/L (ref 21–32)
CO2 SERPL-SCNC: 33 MMOL/L (ref 21–32)
CREAT SERPL-MCNC: 1.12 MG/DL (ref 0.6–1.3)
CREAT SERPL-MCNC: 1.4 MG/DL (ref 0.6–1.3)
CREAT SERPL-MCNC: 1.87 MG/DL (ref 0.57–1)
D DIMER PPP FEU-MCNC: 3.22 UG/ML(FEU)
DIFFERENTIAL METHOD BLD: ABNORMAL
ECHO AO ARCH DIAM: 2.3 CM
ECHO AO DISTAL TRANSVERSE DIAM: 2.5 CM
ECHO AO ROOT DIAM: 3.1 CM
ECHO AO ROOT INDEX: 1.57 CM/M2
ECHO AR MAX VEL PISA: 3.7 M/S
ECHO AV AREA PEAK VELOCITY: 2.6 CM2
ECHO AV AREA VTI: 3.4 CM2
ECHO AV AREA/BSA PEAK VELOCITY: 1.3 CM2/M2
ECHO AV AREA/BSA VTI: 1.7 CM2/M2
ECHO AV MEAN GRADIENT: 3 MMHG
ECHO AV MEAN VELOCITY: 0.9 M/S
ECHO AV PEAK GRADIENT: 6 MMHG
ECHO AV PEAK VELOCITY: 1.2 M/S
ECHO AV REGURGITANT PHT: 205.7 MILLISECOND
ECHO AV VELOCITY RATIO: 1
ECHO AV VTI: 20.9 CM
ECHO BSA: 1.97 M2
ECHO EST RA PRESSURE: 3 MMHG
ECHO LA VOL 2C: 22 ML (ref 22–52)
ECHO LA VOL 2C: 23 ML (ref 22–52)
ECHO LA VOL 4C: 25 ML (ref 22–52)
ECHO LA VOL 4C: 28 ML (ref 22–52)
ECHO LA VOL BP: 26 ML (ref 22–52)
ECHO LA VOL/BSA BIPLANE: 13 ML/M2 (ref 16–34)
ECHO LA VOLUME AREA LENGTH: 28 ML
ECHO LA VOLUME INDEX AREA LENGTH: 14 ML/M2 (ref 16–34)
ECHO LV E' LATERAL VELOCITY: 8 CM/S
ECHO LV E' SEPTAL VELOCITY: 9 CM/S
ECHO LV FRACTIONAL SHORTENING: 33 % (ref 28–44)
ECHO LV INTERNAL DIMENSION DIASTOLE INDEX: 1.98 CM/M2
ECHO LV INTERNAL DIMENSION DIASTOLIC: 3.9 CM (ref 3.9–5.3)
ECHO LV INTERNAL DIMENSION SYSTOLIC INDEX: 1.32 CM/M2
ECHO LV INTERNAL DIMENSION SYSTOLIC: 2.6 CM
ECHO LV IVSD: 1.1 CM (ref 0.6–0.9)
ECHO LV MASS 2D: 131 G (ref 67–162)
ECHO LV MASS INDEX 2D: 66.5 G/M2 (ref 43–95)
ECHO LV POSTERIOR WALL DIASTOLIC: 1 CM (ref 0.6–0.9)
ECHO LV RELATIVE WALL THICKNESS RATIO: 0.51
ECHO LVOT AREA: 2.8 CM2
ECHO LVOT AV VTI INDEX: 1.2
ECHO LVOT DIAM: 1.9 CM
ECHO LVOT MEAN GRADIENT: 3 MMHG
ECHO LVOT PEAK GRADIENT: 5 MMHG
ECHO LVOT PEAK VELOCITY: 1.2 M/S
ECHO LVOT STROKE VOLUME INDEX: 36 ML/M2
ECHO LVOT SV: 70.8 ML
ECHO LVOT VTI: 25 CM
ECHO MV A VELOCITY: 0.55 M/S
ECHO MV AREA VTI: 2.1 CM2
ECHO MV E DECELERATION TIME (DT): 200.2 MS
ECHO MV E VELOCITY: 0.62 M/S
ECHO MV E/A RATIO: 1.13
ECHO MV E/E' LATERAL: 7.75
ECHO MV E/E' RATIO (AVERAGED): 7.32
ECHO MV E/E' SEPTAL: 6.89
ECHO MV LVOT VTI INDEX: 1.35
ECHO MV MAX VELOCITY: 1.5 M/S
ECHO MV MEAN GRADIENT: 3 MMHG
ECHO MV MEAN VELOCITY: 0.7 M/S
ECHO MV PEAK GRADIENT: 9 MMHG
ECHO MV VTI: 33.7 CM
ECHO PV MAX VELOCITY: 0.7 M/S
ECHO PV PEAK GRADIENT: 2 MMHG
ECHO RV BASAL DIMENSION: 3.5 CM
ECHO RV FREE WALL PEAK S': 13 CM/S
ECHO RV TAPSE: 2.2 CM (ref 1.7–?)
EGFRCR SERPLBLD CKD-EPI 2021: 28 ML/MIN/1.73
EKG ATRIAL RATE: 119 BPM
EKG DIAGNOSIS: NORMAL
EKG P AXIS: 81 DEGREES
EKG P-R INTERVAL: 142 MS
EKG Q-T INTERVAL: 330 MS
EKG QRS DURATION: 88 MS
EKG QTC CALCULATION (BAZETT): 464 MS
EKG R AXIS: 47 DEGREES
EKG T AXIS: 73 DEGREES
EKG VENTRICULAR RATE: 119 BPM
EOSINOPHIL # BLD AUTO: 0 X10E3/UL (ref 0–0.4)
EOSINOPHIL # BLD: 0 K/UL (ref 0–0.4)
EOSINOPHIL NFR BLD AUTO: 0 %
EOSINOPHIL NFR BLD: 0 % (ref 0–5)
ERYTHROCYTE [DISTWIDTH] IN BLOOD BY AUTOMATED COUNT: 18.5 % (ref 11.7–15.4)
ERYTHROCYTE [DISTWIDTH] IN BLOOD BY AUTOMATED COUNT: 20.3 % (ref 11.6–14.5)
ERYTHROCYTE [DISTWIDTH] IN BLOOD BY AUTOMATED COUNT: 20.4 % (ref 11.6–14.5)
GLUCOSE SERPL-MCNC: 111 MG/DL (ref 74–99)
GLUCOSE SERPL-MCNC: 115 MG/DL (ref 70–99)
GLUCOSE SERPL-MCNC: 126 MG/DL (ref 74–99)
HCT VFR BLD AUTO: 28.5 % (ref 35–45)
HCT VFR BLD AUTO: 31.8 % (ref 35–45)
HCT VFR BLD AUTO: 35.3 % (ref 34–46.6)
HDLC SERPL-MCNC: 12 MG/DL
HGB BLD-MCNC: 10.6 G/DL (ref 12–16)
HGB BLD-MCNC: 11.3 G/DL (ref 11.1–15.9)
HGB BLD-MCNC: 9.8 G/DL (ref 12–16)
IMM GRANULOCYTES # BLD AUTO: 0.1 K/UL (ref 0–0.04)
IMM GRANULOCYTES # BLD AUTO: 0.1 X10E3/UL (ref 0–0.1)
IMM GRANULOCYTES NFR BLD AUTO: 1 %
IMM GRANULOCYTES NFR BLD AUTO: 1 % (ref 0–0.5)
LDLC SERPL CALC-MCNC: 40 MG/DL (ref 0–99)
LYMPHOCYTES # BLD AUTO: 1.7 X10E3/UL (ref 0.7–3.1)
LYMPHOCYTES # BLD: 2 K/UL (ref 0.9–3.6)
LYMPHOCYTES NFR BLD AUTO: 11 %
LYMPHOCYTES NFR BLD: 15 % (ref 21–52)
MAGNESIUM SERPL-MCNC: 2.3 MG/DL (ref 1.6–2.6)
MCH RBC QN AUTO: 24.5 PG (ref 24–34)
MCH RBC QN AUTO: 24.5 PG (ref 26.6–33)
MCH RBC QN AUTO: 25.3 PG (ref 24–34)
MCHC RBC AUTO-ENTMCNC: 32 G/DL (ref 31.5–35.7)
MCHC RBC AUTO-ENTMCNC: 33.3 G/DL (ref 31–37)
MCHC RBC AUTO-ENTMCNC: 34.4 G/DL (ref 31–37)
MCV RBC AUTO: 73.4 FL (ref 78–100)
MCV RBC AUTO: 73.6 FL (ref 78–100)
MCV RBC AUTO: 77 FL (ref 79–97)
MONOCYTES # BLD AUTO: 1.6 X10E3/UL (ref 0.1–0.9)
MONOCYTES # BLD: 1.5 K/UL (ref 0.05–1.2)
MONOCYTES NFR BLD AUTO: 10 %
MONOCYTES NFR BLD: 11 % (ref 3–10)
NEUTROPHILS # BLD AUTO: 11.9 X10E3/UL (ref 1.4–7)
NEUTROPHILS NFR BLD AUTO: 77 %
NEUTS SEG # BLD: 9.8 K/UL (ref 1.8–8)
NEUTS SEG NFR BLD: 73 % (ref 40–73)
NRBC # BLD: 0 K/UL (ref 0–0.01)
NRBC # BLD: 0 K/UL (ref 0–0.01)
NRBC BLD-RTO: 0 PER 100 WBC
NRBC BLD-RTO: 0 PER 100 WBC
NT PRO BNP: 2844 PG/ML (ref 0–1800)
PLATELET # BLD AUTO: 119 K/UL (ref 135–420)
PLATELET # BLD AUTO: 122 K/UL (ref 135–420)
PLATELET # BLD AUTO: 167 X10E3/UL (ref 150–450)
PMV BLD AUTO: 10.4 FL (ref 9.2–11.8)
PMV BLD AUTO: 9.8 FL (ref 9.2–11.8)
POTASSIUM SERPL-SCNC: 2.6 MMOL/L (ref 3.5–5.5)
POTASSIUM SERPL-SCNC: 3 MMOL/L (ref 3.5–5.2)
POTASSIUM SERPL-SCNC: 3.3 MMOL/L (ref 3.5–5.5)
PROT SERPL-MCNC: 7.4 G/DL (ref 6–8.5)
RBC # BLD AUTO: 3.87 M/UL (ref 4.2–5.3)
RBC # BLD AUTO: 4.33 M/UL (ref 4.2–5.3)
RBC # BLD AUTO: 4.61 X10E6/UL (ref 3.77–5.28)
SODIUM SERPL-SCNC: 127 MMOL/L (ref 136–145)
SODIUM SERPL-SCNC: 129 MMOL/L (ref 136–145)
SODIUM SERPL-SCNC: 131 MMOL/L (ref 134–144)
SPECIMEN STATUS REPORT, ROLRST: NORMAL
TRIGL SERPL-MCNC: 175 MG/DL (ref 0–149)
TROPONIN I SERPL HS-MCNC: 3402 NG/L (ref 0–54)
TROPONIN I SERPL HS-MCNC: 3888 NG/L (ref 0–54)
TROPONIN T SERPL HS-MCNC: 128 NG/L (ref 0–14)
TROPONIN T SERPL HS-MCNC: 128 NG/L (ref 0–14)
TSH SERPL DL<=0.005 MIU/L-ACNC: 1.91 UIU/ML (ref 0.45–4.5)
VLDLC SERPL CALC-MCNC: 30 MG/DL (ref 5–40)
WBC # BLD AUTO: 13.1 K/UL (ref 4.6–13.2)
WBC # BLD AUTO: 13.5 K/UL (ref 4.6–13.2)
WBC # BLD AUTO: 15.4 X10E3/UL (ref 3.4–10.8)

## 2023-04-20 PROCEDURE — 93306 TTE W/DOPPLER COMPLETE: CPT | Performed by: INTERNAL MEDICINE

## 2023-04-20 PROCEDURE — 6360000002 HC RX W HCPCS: Performed by: HOSPITALIST

## 2023-04-20 PROCEDURE — 85027 COMPLETE CBC AUTOMATED: CPT

## 2023-04-20 PROCEDURE — 96374 THER/PROPH/DIAG INJ IV PUSH: CPT | Performed by: EMERGENCY MEDICINE

## 2023-04-20 PROCEDURE — 71275 CT ANGIOGRAPHY CHEST: CPT

## 2023-04-20 PROCEDURE — 82550 ASSAY OF CK (CPK): CPT

## 2023-04-20 PROCEDURE — 99285 EMERGENCY DEPT VISIT HI MDM: CPT | Performed by: EMERGENCY MEDICINE

## 2023-04-20 PROCEDURE — 6360000002 HC RX W HCPCS: Performed by: EMERGENCY MEDICINE

## 2023-04-20 PROCEDURE — 2500000003 HC RX 250 WO HCPCS: Performed by: EMERGENCY MEDICINE

## 2023-04-20 PROCEDURE — 84484 ASSAY OF TROPONIN QUANT: CPT

## 2023-04-20 PROCEDURE — 80048 BASIC METABOLIC PNL TOTAL CA: CPT

## 2023-04-20 PROCEDURE — 93010 ELECTROCARDIOGRAM REPORT: CPT | Performed by: INTERNAL MEDICINE

## 2023-04-20 PROCEDURE — 93005 ELECTROCARDIOGRAM TRACING: CPT | Performed by: EMERGENCY MEDICINE

## 2023-04-20 PROCEDURE — 36415 COLL VENOUS BLD VENIPUNCTURE: CPT

## 2023-04-20 PROCEDURE — 93306 TTE W/DOPPLER COMPLETE: CPT

## 2023-04-20 PROCEDURE — 85025 COMPLETE CBC W/AUTO DIFF WBC: CPT

## 2023-04-20 PROCEDURE — 6370000000 HC RX 637 (ALT 250 FOR IP): Performed by: PHYSICIAN ASSISTANT

## 2023-04-20 PROCEDURE — 70450 CT HEAD/BRAIN W/O DYE: CPT

## 2023-04-20 PROCEDURE — 71045 X-RAY EXAM CHEST 1 VIEW: CPT

## 2023-04-20 PROCEDURE — 6360000004 HC RX CONTRAST MEDICATION: Performed by: HOSPITALIST

## 2023-04-20 PROCEDURE — 99223 1ST HOSP IP/OBS HIGH 75: CPT | Performed by: HOSPITALIST

## 2023-04-20 PROCEDURE — 83735 ASSAY OF MAGNESIUM: CPT

## 2023-04-20 PROCEDURE — 83880 ASSAY OF NATRIURETIC PEPTIDE: CPT

## 2023-04-20 PROCEDURE — 85379 FIBRIN DEGRADATION QUANT: CPT

## 2023-04-20 PROCEDURE — 1100000003 HC PRIVATE W/ TELEMETRY

## 2023-04-20 PROCEDURE — 85730 THROMBOPLASTIN TIME PARTIAL: CPT

## 2023-04-20 RX ORDER — HEPARIN SODIUM 1000 [USP'U]/ML
4000 INJECTION, SOLUTION INTRAVENOUS; SUBCUTANEOUS PRN
Status: DISCONTINUED | OUTPATIENT
Start: 2023-04-20 | End: 2023-04-21

## 2023-04-20 RX ORDER — METOPROLOL TARTRATE 5 MG/5ML
5 INJECTION INTRAVENOUS
Status: COMPLETED | OUTPATIENT
Start: 2023-04-20 | End: 2023-04-20

## 2023-04-20 RX ORDER — HEPARIN SODIUM 1000 [USP'U]/ML
4000 INJECTION, SOLUTION INTRAVENOUS; SUBCUTANEOUS ONCE
Status: COMPLETED | OUTPATIENT
Start: 2023-04-20 | End: 2023-04-20

## 2023-04-20 RX ORDER — POTASSIUM CHLORIDE 20 MEQ/1
40 TABLET, EXTENDED RELEASE ORAL 2 TIMES DAILY WITH MEALS
Status: DISPENSED | OUTPATIENT
Start: 2023-04-20 | End: 2023-04-22

## 2023-04-20 RX ORDER — ASPIRIN 81 MG/1
81 TABLET, CHEWABLE ORAL DAILY
Status: DISCONTINUED | OUTPATIENT
Start: 2023-04-21 | End: 2023-04-28 | Stop reason: HOSPADM

## 2023-04-20 RX ORDER — POLYETHYLENE GLYCOL 3350 17 G/17G
17 POWDER, FOR SOLUTION ORAL DAILY PRN
Status: DISCONTINUED | OUTPATIENT
Start: 2023-04-20 | End: 2023-04-28 | Stop reason: HOSPADM

## 2023-04-20 RX ORDER — HEPARIN SODIUM 1000 [USP'U]/ML
2000 INJECTION, SOLUTION INTRAVENOUS; SUBCUTANEOUS PRN
Status: DISCONTINUED | OUTPATIENT
Start: 2023-04-20 | End: 2023-04-21

## 2023-04-20 RX ORDER — ACETAMINOPHEN 650 MG/1
650 SUPPOSITORY RECTAL EVERY 6 HOURS PRN
Status: DISCONTINUED | OUTPATIENT
Start: 2023-04-20 | End: 2023-04-28 | Stop reason: HOSPADM

## 2023-04-20 RX ORDER — POTASSIUM CHLORIDE 7.45 MG/ML
10 INJECTION INTRAVENOUS
Status: COMPLETED | OUTPATIENT
Start: 2023-04-20 | End: 2023-04-20

## 2023-04-20 RX ORDER — HEPARIN SODIUM 10000 [USP'U]/100ML
5-30 INJECTION, SOLUTION INTRAVENOUS CONTINUOUS
Status: DISCONTINUED | OUTPATIENT
Start: 2023-04-20 | End: 2023-04-21

## 2023-04-20 RX ORDER — ONDANSETRON 4 MG/1
4 TABLET, ORALLY DISINTEGRATING ORAL EVERY 8 HOURS PRN
Status: DISCONTINUED | OUTPATIENT
Start: 2023-04-20 | End: 2023-04-28 | Stop reason: HOSPADM

## 2023-04-20 RX ORDER — ASPIRIN 81 MG/1
324 TABLET, CHEWABLE ORAL ONCE
Status: COMPLETED | OUTPATIENT
Start: 2023-04-20 | End: 2023-04-20

## 2023-04-20 RX ORDER — ACETAMINOPHEN 325 MG/1
650 TABLET ORAL EVERY 6 HOURS PRN
Status: DISCONTINUED | OUTPATIENT
Start: 2023-04-20 | End: 2023-04-28 | Stop reason: HOSPADM

## 2023-04-20 RX ORDER — ONDANSETRON 2 MG/ML
4 INJECTION INTRAMUSCULAR; INTRAVENOUS EVERY 6 HOURS PRN
Status: DISCONTINUED | OUTPATIENT
Start: 2023-04-20 | End: 2023-04-28 | Stop reason: HOSPADM

## 2023-04-20 RX ADMIN — POTASSIUM CHLORIDE 10 MEQ: 7.45 INJECTION INTRAVENOUS at 17:30

## 2023-04-20 RX ADMIN — HEPARIN SODIUM 12 UNITS/KG/HR: 10000 INJECTION, SOLUTION INTRAVENOUS at 13:58

## 2023-04-20 RX ADMIN — IOPAMIDOL 80 ML: 755 INJECTION, SOLUTION INTRAVENOUS at 15:46

## 2023-04-20 RX ADMIN — POTASSIUM CHLORIDE 10 MEQ: 7.45 INJECTION INTRAVENOUS at 18:50

## 2023-04-20 RX ADMIN — ASPIRIN 81 MG CHEWABLE TABLET 324 MG: 81 TABLET CHEWABLE at 17:01

## 2023-04-20 RX ADMIN — HEPARIN SODIUM 4000 UNITS: 1000 INJECTION INTRAVENOUS; SUBCUTANEOUS at 14:02

## 2023-04-20 RX ADMIN — POTASSIUM CHLORIDE 10 MEQ: 7.45 INJECTION INTRAVENOUS at 14:34

## 2023-04-20 RX ADMIN — POTASSIUM CHLORIDE 10 MEQ: 7.45 INJECTION INTRAVENOUS at 16:35

## 2023-04-20 RX ADMIN — METOPROLOL TARTRATE 5 MG: 1 INJECTION, SOLUTION INTRAVENOUS at 13:00

## 2023-04-20 ASSESSMENT — ENCOUNTER SYMPTOMS
GASTROINTESTINAL NEGATIVE: 1
EYES NEGATIVE: 1
RESPIRATORY NEGATIVE: 1

## 2023-04-20 NOTE — TELEPHONE ENCOUNTER
Patient was scheduled to have a nuclear and Echo in the [address] [place] [address] office on 5/1/23 but insurance company had issues and did not get records after uploaded. I attempted to re due authorization and noticed that she was in [facility] ED seeing Dr Emerita Francis for elevated Tropin level.      Will redo authorization if not done in the hospital.

## 2023-04-20 NOTE — PROGRESS NOTES
Labs reviewed. Troponin evaluated and electrolytes are consistent with significant volume depletion. Patient will be sent to emergency room for follow-up. She will need IV fluids and repeat cardiac enzymes along with EKGs and further work-up depending on findings.       Conveyed verbally to clinical team also

## 2023-04-21 PROBLEM — I10 HYPERTENSION: Status: ACTIVE | Noted: 2023-04-21

## 2023-04-21 PROBLEM — E78.5 HYPERLIPIDEMIA: Status: ACTIVE | Noted: 2023-04-21

## 2023-04-21 LAB
ANION GAP SERPL CALC-SCNC: 9 MMOL/L (ref 3–18)
APTT PPP: 100.9 SEC (ref 23–36.4)
APTT PPP: 29.4 SEC (ref 23–36.4)
BASOPHILS # BLD: 0.2 K/UL (ref 0–0.1)
BASOPHILS NFR BLD: 2 % (ref 0–2)
BUN SERPL-MCNC: 34 MG/DL (ref 7–18)
BUN/CREAT SERPL: 32 (ref 12–20)
CALCIUM SERPL-MCNC: 8.8 MG/DL (ref 8.5–10.1)
CHLORIDE SERPL-SCNC: 90 MMOL/L (ref 100–111)
CO2 SERPL-SCNC: 32 MMOL/L (ref 21–32)
CREAT SERPL-MCNC: 1.06 MG/DL (ref 0.6–1.3)
DIFFERENTIAL METHOD BLD: ABNORMAL
ECHO BSA: 1.97 M2
EOSINOPHIL # BLD: 0.2 K/UL (ref 0–0.4)
EOSINOPHIL NFR BLD: 2 % (ref 0–5)
ERYTHROCYTE [DISTWIDTH] IN BLOOD BY AUTOMATED COUNT: 20.7 % (ref 11.6–14.5)
GLUCOSE BLD STRIP.AUTO-MCNC: 121 MG/DL (ref 70–110)
GLUCOSE SERPL-MCNC: 110 MG/DL (ref 74–99)
HCT VFR BLD AUTO: 29.8 % (ref 35–45)
HGB BLD-MCNC: 9.9 G/DL (ref 12–16)
IMM GRANULOCYTES # BLD AUTO: 0 K/UL (ref 0–0.04)
IMM GRANULOCYTES NFR BLD AUTO: 0 % (ref 0–0.5)
LYMPHOCYTES # BLD: 1.7 K/UL (ref 0.9–3.6)
LYMPHOCYTES NFR BLD: 14 % (ref 21–52)
MCH RBC QN AUTO: 24.8 PG (ref 24–34)
MCHC RBC AUTO-ENTMCNC: 33.2 G/DL (ref 31–37)
MCV RBC AUTO: 74.7 FL (ref 78–100)
MONOCYTES # BLD: 1.4 K/UL (ref 0.05–1.2)
MONOCYTES NFR BLD: 11 % (ref 3–10)
NEUTS SEG # BLD: 8.8 K/UL (ref 1.8–8)
NEUTS SEG NFR BLD: 71 % (ref 40–73)
NRBC # BLD: 0 K/UL (ref 0–0.01)
NRBC BLD-RTO: 0 PER 100 WBC
PLATELET # BLD AUTO: 122 K/UL (ref 135–420)
PLATELET COMMENT: ABNORMAL
PMV BLD AUTO: 10.8 FL (ref 9.2–11.8)
POTASSIUM SERPL-SCNC: 3.7 MMOL/L (ref 3.5–5.5)
RBC # BLD AUTO: 3.99 M/UL (ref 4.2–5.3)
RBC MORPH BLD: ABNORMAL
SODIUM SERPL-SCNC: 131 MMOL/L (ref 136–145)
WBC # BLD AUTO: 12.3 K/UL (ref 4.6–13.2)

## 2023-04-21 PROCEDURE — 99152 MOD SED SAME PHYS/QHP 5/>YRS: CPT | Performed by: INTERNAL MEDICINE

## 2023-04-21 PROCEDURE — 93458 L HRT ARTERY/VENTRICLE ANGIO: CPT | Performed by: INTERNAL MEDICINE

## 2023-04-21 PROCEDURE — C1769 GUIDE WIRE: HCPCS | Performed by: INTERNAL MEDICINE

## 2023-04-21 PROCEDURE — 99222 1ST HOSP IP/OBS MODERATE 55: CPT | Performed by: INTERNAL MEDICINE

## 2023-04-21 PROCEDURE — 6360000002 HC RX W HCPCS: Performed by: EMERGENCY MEDICINE

## 2023-04-21 PROCEDURE — 80048 BASIC METABOLIC PNL TOTAL CA: CPT

## 2023-04-21 PROCEDURE — 76000 FLUOROSCOPY <1 HR PHYS/QHP: CPT | Performed by: INTERNAL MEDICINE

## 2023-04-21 PROCEDURE — B2111ZZ FLUOROSCOPY OF MULTIPLE CORONARY ARTERIES USING LOW OSMOLAR CONTRAST: ICD-10-PCS | Performed by: INTERNAL MEDICINE

## 2023-04-21 PROCEDURE — 1100000003 HC PRIVATE W/ TELEMETRY

## 2023-04-21 PROCEDURE — 82962 GLUCOSE BLOOD TEST: CPT

## 2023-04-21 PROCEDURE — 6370000000 HC RX 637 (ALT 250 FOR IP): Performed by: HOSPITALIST

## 2023-04-21 PROCEDURE — 4A023N7 MEASUREMENT OF CARDIAC SAMPLING AND PRESSURE, LEFT HEART, PERCUTANEOUS APPROACH: ICD-10-PCS | Performed by: INTERNAL MEDICINE

## 2023-04-21 PROCEDURE — 2709999900 HC NON-CHARGEABLE SUPPLY: Performed by: INTERNAL MEDICINE

## 2023-04-21 PROCEDURE — 36415 COLL VENOUS BLD VENIPUNCTURE: CPT

## 2023-04-21 PROCEDURE — 6360000004 HC RX CONTRAST MEDICATION: Performed by: INTERNAL MEDICINE

## 2023-04-21 PROCEDURE — 6360000002 HC RX W HCPCS: Performed by: INTERNAL MEDICINE

## 2023-04-21 PROCEDURE — 2500000003 HC RX 250 WO HCPCS: Performed by: INTERNAL MEDICINE

## 2023-04-21 PROCEDURE — 85730 THROMBOPLASTIN TIME PARTIAL: CPT

## 2023-04-21 PROCEDURE — 85025 COMPLETE CBC W/AUTO DIFF WBC: CPT

## 2023-04-21 PROCEDURE — 6370000000 HC RX 637 (ALT 250 FOR IP): Performed by: PHYSICIAN ASSISTANT

## 2023-04-21 PROCEDURE — C1713 ANCHOR/SCREW BN/BN,TIS/BN: HCPCS | Performed by: INTERNAL MEDICINE

## 2023-04-21 PROCEDURE — C1894 INTRO/SHEATH, NON-LASER: HCPCS | Performed by: INTERNAL MEDICINE

## 2023-04-21 RX ORDER — FENTANYL CITRATE 50 UG/ML
INJECTION, SOLUTION INTRAMUSCULAR; INTRAVENOUS PRN
Status: DISCONTINUED | OUTPATIENT
Start: 2023-04-21 | End: 2023-04-21 | Stop reason: HOSPADM

## 2023-04-21 RX ORDER — NITROGLYCERIN 20 MG/100ML
INJECTION INTRAVENOUS PRN
Status: DISCONTINUED | OUTPATIENT
Start: 2023-04-21 | End: 2023-04-21 | Stop reason: HOSPADM

## 2023-04-21 RX ORDER — MIDAZOLAM HYDROCHLORIDE 1 MG/ML
INJECTION INTRAMUSCULAR; INTRAVENOUS PRN
Status: DISCONTINUED | OUTPATIENT
Start: 2023-04-21 | End: 2023-04-21 | Stop reason: HOSPADM

## 2023-04-21 RX ORDER — HEPARIN SODIUM 1000 [USP'U]/ML
INJECTION, SOLUTION INTRAVENOUS; SUBCUTANEOUS PRN
Status: DISCONTINUED | OUTPATIENT
Start: 2023-04-21 | End: 2023-04-21 | Stop reason: HOSPADM

## 2023-04-21 RX ORDER — IODIXANOL 320 MG/ML
INJECTION, SOLUTION INTRAVASCULAR PRN
Status: DISCONTINUED | OUTPATIENT
Start: 2023-04-21 | End: 2023-04-21 | Stop reason: HOSPADM

## 2023-04-21 RX ADMIN — POTASSIUM CHLORIDE 40 MEQ: 1500 TABLET, EXTENDED RELEASE ORAL at 08:44

## 2023-04-21 RX ADMIN — HEPARIN SODIUM 4000 UNITS: 1000 INJECTION INTRAVENOUS; SUBCUTANEOUS at 00:15

## 2023-04-21 RX ADMIN — APIXABAN 5 MG: 5 TABLET, FILM COATED ORAL at 21:27

## 2023-04-21 RX ADMIN — ASPIRIN 81 MG CHEWABLE TABLET 81 MG: 81 TABLET CHEWABLE at 08:44

## 2023-04-21 NOTE — TELEPHONE ENCOUNTER
Patient is still in the ED Bed 01.  I have canceled her in office testing that was scheduled to be done in the office on 5/1/23 due to her being in the hospital.

## 2023-04-21 NOTE — TELEPHONE ENCOUNTER
Patient was seen by Hanh AGEE for us and she ordered patient to have echo and that was done at the hospital and she maybe going for possible cath. Patient is scheduled to have a nuclear in the office on 5/1/23 in our office. Is the nuclear still needed? Please Advise.

## 2023-04-22 LAB
ANION GAP SERPL CALC-SCNC: 6 MMOL/L (ref 3–18)
APTT PPP: 36 SEC (ref 23–36.4)
BASOPHILS # BLD: 0.1 K/UL (ref 0–0.1)
BASOPHILS NFR BLD: 1 % (ref 0–2)
BUN SERPL-MCNC: 25 MG/DL (ref 7–18)
BUN/CREAT SERPL: 25 (ref 12–20)
CALCIUM SERPL-MCNC: 8.8 MG/DL (ref 8.5–10.1)
CHLORIDE SERPL-SCNC: 93 MMOL/L (ref 100–111)
CO2 SERPL-SCNC: 32 MMOL/L (ref 21–32)
CREAT SERPL-MCNC: 1 MG/DL (ref 0.6–1.3)
DIFFERENTIAL METHOD BLD: ABNORMAL
EOSINOPHIL # BLD: 0 K/UL (ref 0–0.4)
EOSINOPHIL NFR BLD: 0 % (ref 0–5)
ERYTHROCYTE [DISTWIDTH] IN BLOOD BY AUTOMATED COUNT: 21 % (ref 11.6–14.5)
GLUCOSE SERPL-MCNC: 112 MG/DL (ref 74–99)
HCT VFR BLD AUTO: 27.9 % (ref 35–45)
HGB BLD-MCNC: 9.4 G/DL (ref 12–16)
IMM GRANULOCYTES # BLD AUTO: 0.1 K/UL (ref 0–0.04)
IMM GRANULOCYTES NFR BLD AUTO: 1 % (ref 0–0.5)
LYMPHOCYTES # BLD: 1.8 K/UL (ref 0.9–3.6)
LYMPHOCYTES NFR BLD: 16 % (ref 21–52)
MAGNESIUM SERPL-MCNC: 2.2 MG/DL (ref 1.6–2.6)
MCH RBC QN AUTO: 25.1 PG (ref 24–34)
MCHC RBC AUTO-ENTMCNC: 33.7 G/DL (ref 31–37)
MCV RBC AUTO: 74.4 FL (ref 78–100)
MONOCYTES # BLD: 1.4 K/UL (ref 0.05–1.2)
MONOCYTES NFR BLD: 13 % (ref 3–10)
NEUTS SEG # BLD: 7.5 K/UL (ref 1.8–8)
NEUTS SEG NFR BLD: 69 % (ref 40–73)
NRBC # BLD: 0 K/UL (ref 0–0.01)
NRBC BLD-RTO: 0 PER 100 WBC
PLATELET # BLD AUTO: 127 K/UL (ref 135–420)
PMV BLD AUTO: 10.4 FL (ref 9.2–11.8)
POTASSIUM SERPL-SCNC: 3.4 MMOL/L (ref 3.5–5.5)
RBC # BLD AUTO: 3.75 M/UL (ref 4.2–5.3)
SODIUM SERPL-SCNC: 131 MMOL/L (ref 136–145)
WBC # BLD AUTO: 11 K/UL (ref 4.6–13.2)

## 2023-04-22 PROCEDURE — 1100000003 HC PRIVATE W/ TELEMETRY

## 2023-04-22 PROCEDURE — 6370000000 HC RX 637 (ALT 250 FOR IP): Performed by: HOSPITALIST

## 2023-04-22 PROCEDURE — 99232 SBSQ HOSP IP/OBS MODERATE 35: CPT | Performed by: HOSPITALIST

## 2023-04-22 PROCEDURE — 36415 COLL VENOUS BLD VENIPUNCTURE: CPT

## 2023-04-22 PROCEDURE — 99232 SBSQ HOSP IP/OBS MODERATE 35: CPT | Performed by: INTERNAL MEDICINE

## 2023-04-22 PROCEDURE — 6370000000 HC RX 637 (ALT 250 FOR IP): Performed by: PHYSICIAN ASSISTANT

## 2023-04-22 PROCEDURE — 85730 THROMBOPLASTIN TIME PARTIAL: CPT

## 2023-04-22 PROCEDURE — 94761 N-INVAS EAR/PLS OXIMETRY MLT: CPT

## 2023-04-22 PROCEDURE — 80048 BASIC METABOLIC PNL TOTAL CA: CPT

## 2023-04-22 PROCEDURE — 85025 COMPLETE CBC W/AUTO DIFF WBC: CPT

## 2023-04-22 PROCEDURE — 83735 ASSAY OF MAGNESIUM: CPT

## 2023-04-22 RX ADMIN — ASPIRIN 81 MG CHEWABLE TABLET 81 MG: 81 TABLET CHEWABLE at 08:53

## 2023-04-22 RX ADMIN — POTASSIUM CHLORIDE 40 MEQ: 1500 TABLET, EXTENDED RELEASE ORAL at 08:52

## 2023-04-22 RX ADMIN — APIXABAN 5 MG: 5 TABLET, FILM COATED ORAL at 08:53

## 2023-04-22 RX ADMIN — APIXABAN 5 MG: 5 TABLET, FILM COATED ORAL at 21:00

## 2023-04-23 PROBLEM — R00.0 TACHYCARDIA: Status: ACTIVE | Noted: 2023-04-23

## 2023-04-23 LAB
ANION GAP SERPL CALC-SCNC: 7 MMOL/L (ref 3–18)
BASOPHILS # BLD: 0.1 K/UL (ref 0–0.1)
BASOPHILS NFR BLD: 1 % (ref 0–2)
BUN SERPL-MCNC: 21 MG/DL (ref 7–18)
BUN/CREAT SERPL: 22 (ref 12–20)
CALCIUM SERPL-MCNC: 8.6 MG/DL (ref 8.5–10.1)
CHLORIDE SERPL-SCNC: 92 MMOL/L (ref 100–111)
CO2 SERPL-SCNC: 30 MMOL/L (ref 21–32)
CREAT SERPL-MCNC: 0.97 MG/DL (ref 0.6–1.3)
DIFFERENTIAL METHOD BLD: ABNORMAL
EOSINOPHIL # BLD: 0 K/UL (ref 0–0.4)
EOSINOPHIL NFR BLD: 0 % (ref 0–5)
ERYTHROCYTE [DISTWIDTH] IN BLOOD BY AUTOMATED COUNT: 21.6 % (ref 11.6–14.5)
GLUCOSE SERPL-MCNC: 112 MG/DL (ref 74–99)
HCT VFR BLD AUTO: 29.1 % (ref 35–45)
HEMOCCULT STL QL: POSITIVE
HGB BLD-MCNC: 9.5 G/DL (ref 12–16)
IMM GRANULOCYTES # BLD AUTO: 0.2 K/UL (ref 0–0.04)
IMM GRANULOCYTES NFR BLD AUTO: 2 % (ref 0–0.5)
LYMPHOCYTES # BLD: 1.4 K/UL (ref 0.9–3.6)
LYMPHOCYTES NFR BLD: 16 % (ref 21–52)
MCH RBC QN AUTO: 24.8 PG (ref 24–34)
MCHC RBC AUTO-ENTMCNC: 32.6 G/DL (ref 31–37)
MCV RBC AUTO: 76 FL (ref 78–100)
MONOCYTES # BLD: 1.1 K/UL (ref 0.05–1.2)
MONOCYTES NFR BLD: 12 % (ref 3–10)
NEUTS SEG # BLD: 6.2 K/UL (ref 1.8–8)
NEUTS SEG NFR BLD: 69 % (ref 40–73)
NRBC # BLD: 0 K/UL (ref 0–0.01)
NRBC BLD-RTO: 0 PER 100 WBC
PLATELET # BLD AUTO: 142 K/UL (ref 135–420)
PMV BLD AUTO: 11.2 FL (ref 9.2–11.8)
POTASSIUM SERPL-SCNC: 3.8 MMOL/L (ref 3.5–5.5)
RBC # BLD AUTO: 3.83 M/UL (ref 4.2–5.3)
SODIUM SERPL-SCNC: 129 MMOL/L (ref 136–145)
TROPONIN I SERPL HS-MCNC: 1013 NG/L (ref 0–54)
TROPONIN I SERPL HS-MCNC: 1196 NG/L (ref 0–54)
WBC # BLD AUTO: 8.9 K/UL (ref 4.6–13.2)

## 2023-04-23 PROCEDURE — 1100000003 HC PRIVATE W/ TELEMETRY

## 2023-04-23 PROCEDURE — 2580000003 HC RX 258: Performed by: INTERNAL MEDICINE

## 2023-04-23 PROCEDURE — 6370000000 HC RX 637 (ALT 250 FOR IP): Performed by: PHYSICIAN ASSISTANT

## 2023-04-23 PROCEDURE — 99232 SBSQ HOSP IP/OBS MODERATE 35: CPT | Performed by: HOSPITALIST

## 2023-04-23 PROCEDURE — 94761 N-INVAS EAR/PLS OXIMETRY MLT: CPT

## 2023-04-23 PROCEDURE — 6370000000 HC RX 637 (ALT 250 FOR IP): Performed by: INTERNAL MEDICINE

## 2023-04-23 PROCEDURE — 99232 SBSQ HOSP IP/OBS MODERATE 35: CPT | Performed by: INTERNAL MEDICINE

## 2023-04-23 PROCEDURE — 85025 COMPLETE CBC W/AUTO DIFF WBC: CPT

## 2023-04-23 PROCEDURE — 82272 OCCULT BLD FECES 1-3 TESTS: CPT

## 2023-04-23 PROCEDURE — 84484 ASSAY OF TROPONIN QUANT: CPT

## 2023-04-23 PROCEDURE — 36415 COLL VENOUS BLD VENIPUNCTURE: CPT

## 2023-04-23 PROCEDURE — 6370000000 HC RX 637 (ALT 250 FOR IP): Performed by: HOSPITALIST

## 2023-04-23 PROCEDURE — 80048 BASIC METABOLIC PNL TOTAL CA: CPT

## 2023-04-23 RX ORDER — SODIUM CHLORIDE 9 MG/ML
INJECTION, SOLUTION INTRAVENOUS CONTINUOUS
Status: DISPENSED | OUTPATIENT
Start: 2023-04-23 | End: 2023-04-23

## 2023-04-23 RX ORDER — AMLODIPINE BESYLATE 5 MG/1
5 TABLET ORAL DAILY
Status: DISCONTINUED | OUTPATIENT
Start: 2023-04-23 | End: 2023-04-28 | Stop reason: HOSPADM

## 2023-04-23 RX ORDER — AMLODIPINE BESYLATE 5 MG/1
5 TABLET ORAL DAILY
Status: DISCONTINUED | OUTPATIENT
Start: 2023-04-23 | End: 2023-04-23

## 2023-04-23 RX ORDER — METOPROLOL SUCCINATE 25 MG/1
25 TABLET, EXTENDED RELEASE ORAL DAILY
Status: DISCONTINUED | OUTPATIENT
Start: 2023-04-23 | End: 2023-04-28 | Stop reason: HOSPADM

## 2023-04-23 RX ORDER — ROSUVASTATIN CALCIUM 10 MG/1
5 TABLET, COATED ORAL DAILY
Status: DISCONTINUED | OUTPATIENT
Start: 2023-04-23 | End: 2023-04-28 | Stop reason: HOSPADM

## 2023-04-23 RX ADMIN — METOPROLOL SUCCINATE 25 MG: 25 TABLET, EXTENDED RELEASE ORAL at 14:23

## 2023-04-23 RX ADMIN — AMLODIPINE BESYLATE 5 MG: 5 TABLET ORAL at 08:21

## 2023-04-23 RX ADMIN — ASPIRIN 81 MG CHEWABLE TABLET 81 MG: 81 TABLET CHEWABLE at 08:21

## 2023-04-23 RX ADMIN — APIXABAN 5 MG: 5 TABLET, FILM COATED ORAL at 08:21

## 2023-04-23 RX ADMIN — SODIUM CHLORIDE: 9 INJECTION, SOLUTION INTRAVENOUS at 13:56

## 2023-04-23 RX ADMIN — ROSUVASTATIN CALCIUM 5 MG: 10 TABLET, COATED ORAL at 08:21

## 2023-04-24 ENCOUNTER — APPOINTMENT (OUTPATIENT)
Facility: HOSPITAL | Age: 78
End: 2023-04-24
Payer: MEDICARE

## 2023-04-24 LAB
ECHO AO ASC DIAM: 2.1 CM
ECHO AO ASCENDING AORTA INDEX: 1.07 CM/M2
ECHO AR MAX VEL PISA: 4.2 M/S
ECHO AV AREA PEAK VELOCITY: 2.7 CM2
ECHO AV AREA VTI: 3 CM2
ECHO AV AREA/BSA PEAK VELOCITY: 1.4 CM2/M2
ECHO AV AREA/BSA VTI: 1.5 CM2/M2
ECHO AV MEAN GRADIENT: 5 MMHG
ECHO AV MEAN VELOCITY: 1 M/S
ECHO AV PEAK GRADIENT: 10 MMHG
ECHO AV PEAK VELOCITY: 1.6 M/S
ECHO AV REGURGITANT PHT: 179.1 MILLISECOND
ECHO AV VELOCITY RATIO: 0.75
ECHO AV VTI: 26.1 CM
ECHO BSA: 1.97 M2
ECHO LA VOL 2C: 24 ML (ref 22–52)
ECHO LA VOL 4C: 16 ML (ref 22–52)
ECHO LA VOL BP: 20 ML (ref 22–52)
ECHO LA VOL/BSA BIPLANE: 10 ML/M2 (ref 16–34)
ECHO LA VOLUME AREA LENGTH: 21 ML
ECHO LA VOLUME INDEX A2C: 12 ML/M2 (ref 16–34)
ECHO LA VOLUME INDEX A4C: 8 ML/M2 (ref 16–34)
ECHO LA VOLUME INDEX AREA LENGTH: 11 ML/M2 (ref 16–34)
ECHO LV E' LATERAL VELOCITY: 11 CM/S
ECHO LV E' SEPTAL VELOCITY: 8 CM/S
ECHO LV FRACTIONAL SHORTENING: 44 % (ref 28–44)
ECHO LV INTERNAL DIMENSION DIASTOLE INDEX: 2.28 CM/M2
ECHO LV INTERNAL DIMENSION DIASTOLIC: 4.5 CM (ref 3.9–5.3)
ECHO LV INTERNAL DIMENSION SYSTOLIC INDEX: 1.27 CM/M2
ECHO LV INTERNAL DIMENSION SYSTOLIC: 2.5 CM
ECHO LV IVSD: 0.7 CM (ref 0.6–0.9)
ECHO LV MASS 2D: 113.6 G (ref 67–162)
ECHO LV MASS INDEX 2D: 57.7 G/M2 (ref 43–95)
ECHO LV POSTERIOR WALL DIASTOLIC: 0.9 CM (ref 0.6–0.9)
ECHO LV RELATIVE WALL THICKNESS RATIO: 0.4
ECHO LVOT AREA: 3.8 CM2
ECHO LVOT AV VTI INDEX: 0.83
ECHO LVOT DIAM: 2.2 CM
ECHO LVOT MEAN GRADIENT: 3 MMHG
ECHO LVOT PEAK GRADIENT: 5 MMHG
ECHO LVOT PEAK VELOCITY: 1.2 M/S
ECHO LVOT STROKE VOLUME INDEX: 41.9 ML/M2
ECHO LVOT SV: 82.4 ML
ECHO LVOT VTI: 21.7 CM
ECHO MV A VELOCITY: 0.57 M/S
ECHO MV E DECELERATION TIME (DT): 209.5 MS
ECHO MV E VELOCITY: 0.64 M/S
ECHO MV E/A RATIO: 1.12
ECHO MV E/E' LATERAL: 5.82
ECHO MV E/E' RATIO (AVERAGED): 6.91
ECHO MV E/E' SEPTAL: 8
ECHO PV MAX VELOCITY: 1.1 M/S
ECHO PV PEAK GRADIENT: 5 MMHG
ECHO RV TAPSE: 2.5 CM (ref 1.7–?)
ECHO TV REGURGITANT MAX VELOCITY: 1.78 M/S
ECHO TV REGURGITANT PEAK GRADIENT: 13 MMHG
EKG ATRIAL RATE: 107 BPM
EKG DIAGNOSIS: NORMAL
EKG P AXIS: 74 DEGREES
EKG P-R INTERVAL: 150 MS
EKG Q-T INTERVAL: 336 MS
EKG QRS DURATION: 78 MS
EKG QTC CALCULATION (BAZETT): 448 MS
EKG R AXIS: -13 DEGREES
EKG T AXIS: 68 DEGREES
EKG VENTRICULAR RATE: 107 BPM
ERYTHROCYTE [DISTWIDTH] IN BLOOD BY AUTOMATED COUNT: 21.7 % (ref 11.6–14.5)
HCT VFR BLD AUTO: 28.9 % (ref 35–45)
HGB BLD-MCNC: 9.4 G/DL (ref 12–16)
MCH RBC QN AUTO: 25.5 PG (ref 24–34)
MCHC RBC AUTO-ENTMCNC: 32.5 G/DL (ref 31–37)
MCV RBC AUTO: 78.5 FL (ref 78–100)
NRBC # BLD: 0 K/UL (ref 0–0.01)
NRBC BLD-RTO: 0 PER 100 WBC
PLATELET # BLD AUTO: 161 K/UL (ref 135–420)
PMV BLD AUTO: 11.5 FL (ref 9.2–11.8)
RBC # BLD AUTO: 3.68 M/UL (ref 4.2–5.3)
TROPONIN I SERPL HS-MCNC: 731 NG/L (ref 0–54)
TROPONIN I SERPL HS-MCNC: 855 NG/L (ref 0–54)
TROPONIN I SERPL HS-MCNC: 915 NG/L (ref 0–54)
TROPONIN I SERPL HS-MCNC: 974 NG/L (ref 0–54)
WBC # BLD AUTO: 8 K/UL (ref 4.6–13.2)

## 2023-04-24 PROCEDURE — 93306 TTE W/DOPPLER COMPLETE: CPT

## 2023-04-24 PROCEDURE — 93306 TTE W/DOPPLER COMPLETE: CPT | Performed by: INTERNAL MEDICINE

## 2023-04-24 PROCEDURE — 6370000000 HC RX 637 (ALT 250 FOR IP): Performed by: HOSPITALIST

## 2023-04-24 PROCEDURE — 85027 COMPLETE CBC AUTOMATED: CPT

## 2023-04-24 PROCEDURE — 99232 SBSQ HOSP IP/OBS MODERATE 35: CPT | Performed by: INTERNAL MEDICINE

## 2023-04-24 PROCEDURE — 6370000000 HC RX 637 (ALT 250 FOR IP): Performed by: PHYSICIAN ASSISTANT

## 2023-04-24 PROCEDURE — 6370000000 HC RX 637 (ALT 250 FOR IP): Performed by: INTERNAL MEDICINE

## 2023-04-24 PROCEDURE — 99232 SBSQ HOSP IP/OBS MODERATE 35: CPT | Performed by: HOSPITALIST

## 2023-04-24 PROCEDURE — 84484 ASSAY OF TROPONIN QUANT: CPT

## 2023-04-24 PROCEDURE — 36415 COLL VENOUS BLD VENIPUNCTURE: CPT

## 2023-04-24 PROCEDURE — 94761 N-INVAS EAR/PLS OXIMETRY MLT: CPT

## 2023-04-24 PROCEDURE — 1100000003 HC PRIVATE W/ TELEMETRY

## 2023-04-24 RX ADMIN — METOPROLOL SUCCINATE 25 MG: 25 TABLET, EXTENDED RELEASE ORAL at 09:03

## 2023-04-24 RX ADMIN — AMLODIPINE BESYLATE 5 MG: 5 TABLET ORAL at 09:03

## 2023-04-24 RX ADMIN — ASPIRIN 81 MG CHEWABLE TABLET 81 MG: 81 TABLET CHEWABLE at 09:03

## 2023-04-24 RX ADMIN — ROSUVASTATIN CALCIUM 5 MG: 10 TABLET, COATED ORAL at 09:03

## 2023-04-24 NOTE — CONSULTS
[email or web address]  [phone]    GASTROENTEROLOGY CONSULT      Impression:   1. Anemia - H/H 9.4/28.9, decreased from Hgb 11.3 on 4/18; Comparison Hgb 12.4 on 9/14/23  2. Rectal bleeding - reports small amt painless BRBPR since admission, no prior history of rectal bleeding  3. NSTEMI - s/p cardiac cath 4/21/23 w/ normal coronaries  4. Elevated D dimer  5. Hx recurrent DVT/PE, Factor V Leiden deficiency - chronic anticoagulation w/ Eliquis  6. RAISA  7. Hx colon polyps - Per patient report, last colonoscopy ~8 years ago w/ Dr. Olaf Contreras and showed 2 small polyps w/ 5 year recall. (Records unavailable)      Plan:     1. Hold Eliquis if cardiology/hospitalist  in agreement  2. BID PPI  3. Clear liquid diet tomorrow  4. Plan for EGD/colonoscopy 4/26/23  5. Continue medical management per primary team      Chief Complaint: Anemia, rectal bleeding      HPI:  Acosta Sandra is a 69 y.o. female who I am being asked to see in consultation for an opinion regarding Anemia, rectal bleeding. She was admitted for NSTEMI, Hx recurrent DVT/PE, Factor V Leiden deficiency, on chronic anticoagulation w/ Eliquis. She was noted to have down trending Hgb over several days, 11.3 to 9.4. She had what she describes as small volume bright red painless rectal bleeding since admission. She denies nausea, vomiting, GERD, dysphagia, abdominal pain, change in appetite, unintended weight loss, change in bowel habits. No history of rectal bleeding prior to this hospitalization. No pertinent family history reported. No prior EGD. Last colonoscopy aprox 8 years ago by Dr. Olaf Contreras which showed 2 polyps, 5 year repeat was recommended. Pt seen x3, daughter at bedside second and third visit. Initially declined EGD/colo, now amenable to proceed.      PMH:   Past Medical History:   Diagnosis Date    Clotting disorder ([facility] [address]     Essential hypertension, benign     Factor V Leiden ([facility] [address]     Hyperlipidemia     Nonspecific abnormal electrocardiogram (ECG) (EKG)

## 2023-04-25 ENCOUNTER — ANESTHESIA EVENT (OUTPATIENT)
Facility: HOSPITAL | Age: 78
End: 2023-04-25
Payer: MEDICARE

## 2023-04-25 LAB
TROPONIN I SERPL HS-MCNC: 441 NG/L (ref 0–54)
TROPONIN I SERPL HS-MCNC: 509 NG/L (ref 0–54)
TROPONIN I SERPL HS-MCNC: 556 NG/L (ref 0–54)
TROPONIN I SERPL HS-MCNC: 779 NG/L (ref 0–54)

## 2023-04-25 PROCEDURE — 6370000000 HC RX 637 (ALT 250 FOR IP): Performed by: PHYSICIAN ASSISTANT

## 2023-04-25 PROCEDURE — 6370000000 HC RX 637 (ALT 250 FOR IP): Performed by: INTERNAL MEDICINE

## 2023-04-25 PROCEDURE — 84484 ASSAY OF TROPONIN QUANT: CPT

## 2023-04-25 PROCEDURE — 36415 COLL VENOUS BLD VENIPUNCTURE: CPT

## 2023-04-25 PROCEDURE — 1100000003 HC PRIVATE W/ TELEMETRY

## 2023-04-25 PROCEDURE — 99232 SBSQ HOSP IP/OBS MODERATE 35: CPT | Performed by: HOSPITALIST

## 2023-04-25 PROCEDURE — 6370000000 HC RX 637 (ALT 250 FOR IP): Performed by: HOSPITALIST

## 2023-04-25 PROCEDURE — 94761 N-INVAS EAR/PLS OXIMETRY MLT: CPT

## 2023-04-25 RX ORDER — SODIUM CHLORIDE 0.9 % (FLUSH) 0.9 %
5-40 SYRINGE (ML) INJECTION EVERY 12 HOURS SCHEDULED
Status: CANCELLED | OUTPATIENT
Start: 2023-04-25

## 2023-04-25 RX ORDER — SODIUM CHLORIDE 0.9 % (FLUSH) 0.9 %
5-40 SYRINGE (ML) INJECTION PRN
Status: CANCELLED | OUTPATIENT
Start: 2023-04-25

## 2023-04-25 RX ORDER — SODIUM CHLORIDE 9 MG/ML
INJECTION, SOLUTION INTRAVENOUS PRN
Status: CANCELLED | OUTPATIENT
Start: 2023-04-25

## 2023-04-25 RX ORDER — SODIUM CHLORIDE, SODIUM LACTATE, POTASSIUM CHLORIDE, CALCIUM CHLORIDE 600; 310; 30; 20 MG/100ML; MG/100ML; MG/100ML; MG/100ML
INJECTION, SOLUTION INTRAVENOUS CONTINUOUS
Status: CANCELLED | OUTPATIENT
Start: 2023-04-25

## 2023-04-25 RX ADMIN — POLYETHYLENE GLYCOL-3350 AND ELECTROLYTES 2000 ML: 236; 6.74; 5.86; 2.97; 22.74 POWDER, FOR SOLUTION ORAL at 18:03

## 2023-04-25 RX ADMIN — POLYETHYLENE GLYCOL 3350 17 G: 17 POWDER, FOR SOLUTION ORAL at 18:03

## 2023-04-25 RX ADMIN — ASPIRIN 81 MG CHEWABLE TABLET 81 MG: 81 TABLET CHEWABLE at 10:10

## 2023-04-25 RX ADMIN — AMLODIPINE BESYLATE 5 MG: 5 TABLET ORAL at 10:10

## 2023-04-25 RX ADMIN — METOPROLOL SUCCINATE 25 MG: 25 TABLET, EXTENDED RELEASE ORAL at 10:10

## 2023-04-25 RX ADMIN — ROSUVASTATIN CALCIUM 5 MG: 10 TABLET, COATED ORAL at 10:10

## 2023-04-26 ENCOUNTER — ANESTHESIA (OUTPATIENT)
Facility: HOSPITAL | Age: 78
End: 2023-04-26
Payer: MEDICARE

## 2023-04-26 ENCOUNTER — APPOINTMENT (OUTPATIENT)
Facility: HOSPITAL | Age: 78
End: 2023-04-26
Payer: MEDICARE

## 2023-04-26 LAB
ANION GAP SERPL CALC-SCNC: 4 MMOL/L (ref 3–18)
BUN SERPL-MCNC: 17 MG/DL (ref 7–18)
BUN/CREAT SERPL: 19 (ref 12–20)
CALCIUM SERPL-MCNC: 8.6 MG/DL (ref 8.5–10.1)
CHLORIDE SERPL-SCNC: 97 MMOL/L (ref 100–111)
CO2 SERPL-SCNC: 30 MMOL/L (ref 21–32)
CREAT SERPL-MCNC: 0.91 MG/DL (ref 0.6–1.3)
ERYTHROCYTE [DISTWIDTH] IN BLOOD BY AUTOMATED COUNT: 21.9 % (ref 11.6–14.5)
GLUCOSE SERPL-MCNC: 108 MG/DL (ref 74–99)
HCT VFR BLD AUTO: 24.5 % (ref 35–45)
HGB BLD-MCNC: 7.9 G/DL (ref 12–16)
MCH RBC QN AUTO: 25.3 PG (ref 24–34)
MCHC RBC AUTO-ENTMCNC: 32.2 G/DL (ref 31–37)
MCV RBC AUTO: 78.5 FL (ref 78–100)
NRBC # BLD: 0 K/UL (ref 0–0.01)
NRBC BLD-RTO: 0 PER 100 WBC
PLATELET # BLD AUTO: 181 K/UL (ref 135–420)
PMV BLD AUTO: 10.1 FL (ref 9.2–11.8)
POTASSIUM SERPL-SCNC: 3.7 MMOL/L (ref 3.5–5.5)
RBC # BLD AUTO: 3.12 M/UL (ref 4.2–5.3)
SODIUM SERPL-SCNC: 131 MMOL/L (ref 136–145)
TROPONIN I SERPL HS-MCNC: 421 NG/L (ref 0–54)
WBC # BLD AUTO: 7.8 K/UL (ref 4.6–13.2)

## 2023-04-26 PROCEDURE — 6370000000 HC RX 637 (ALT 250 FOR IP): Performed by: HOSPITALIST

## 2023-04-26 PROCEDURE — A4641 RADIOPHARM DX AGENT NOC: HCPCS | Performed by: INTERNAL MEDICINE

## 2023-04-26 PROCEDURE — 00731 ANES UPR GI NDSC PX NOS: CPT | Performed by: ANESTHESIOLOGY

## 2023-04-26 PROCEDURE — 94761 N-INVAS EAR/PLS OXIMETRY MLT: CPT

## 2023-04-26 PROCEDURE — 36415 COLL VENOUS BLD VENIPUNCTURE: CPT

## 2023-04-26 PROCEDURE — 7100000000 HC PACU RECOVERY - FIRST 15 MIN: Performed by: INTERNAL MEDICINE

## 2023-04-26 PROCEDURE — 2580000003 HC RX 258: Performed by: NURSE ANESTHETIST, CERTIFIED REGISTERED

## 2023-04-26 PROCEDURE — 2500000003 HC RX 250 WO HCPCS: Performed by: INTERNAL MEDICINE

## 2023-04-26 PROCEDURE — 6360000004 HC RX CONTRAST MEDICATION: Performed by: INTERNAL MEDICINE

## 2023-04-26 PROCEDURE — 99233 SBSQ HOSP IP/OBS HIGH 50: CPT | Performed by: STUDENT IN AN ORGANIZED HEALTH CARE EDUCATION/TRAINING PROGRAM

## 2023-04-26 PROCEDURE — 7100000001 HC PACU RECOVERY - ADDTL 15 MIN: Performed by: INTERNAL MEDICINE

## 2023-04-26 PROCEDURE — 85027 COMPLETE CBC AUTOMATED: CPT

## 2023-04-26 PROCEDURE — 99100 ANES PT EXTEME AGE<1 YR&>70: CPT | Performed by: ANESTHESIOLOGY

## 2023-04-26 PROCEDURE — 6370000000 HC RX 637 (ALT 250 FOR IP): Performed by: PHYSICIAN ASSISTANT

## 2023-04-26 PROCEDURE — 7100000010 HC PHASE II RECOVERY - FIRST 15 MIN: Performed by: INTERNAL MEDICINE

## 2023-04-26 PROCEDURE — 6370000000 HC RX 637 (ALT 250 FOR IP): Performed by: INTERNAL MEDICINE

## 2023-04-26 PROCEDURE — 3600007502: Performed by: INTERNAL MEDICINE

## 2023-04-26 PROCEDURE — 80048 BASIC METABOLIC PNL TOTAL CA: CPT

## 2023-04-26 PROCEDURE — 84484 ASSAY OF TROPONIN QUANT: CPT

## 2023-04-26 PROCEDURE — 6360000002 HC RX W HCPCS: Performed by: NURSE ANESTHETIST, CERTIFIED REGISTERED

## 2023-04-26 PROCEDURE — 3700000000 HC ANESTHESIA ATTENDED CARE: Performed by: INTERNAL MEDICINE

## 2023-04-26 PROCEDURE — 2709999900 HC NON-CHARGEABLE SUPPLY: Performed by: INTERNAL MEDICINE

## 2023-04-26 PROCEDURE — 74220 X-RAY XM ESOPHAGUS 1CNTRST: CPT

## 2023-04-26 PROCEDURE — 7100000011 HC PHASE II RECOVERY - ADDTL 15 MIN: Performed by: INTERNAL MEDICINE

## 2023-04-26 PROCEDURE — 2500000003 HC RX 250 WO HCPCS: Performed by: NURSE ANESTHETIST, CERTIFIED REGISTERED

## 2023-04-26 PROCEDURE — 1100000003 HC PRIVATE W/ TELEMETRY

## 2023-04-26 RX ORDER — SODIUM CHLORIDE, SODIUM LACTATE, POTASSIUM CHLORIDE, CALCIUM CHLORIDE 600; 310; 30; 20 MG/100ML; MG/100ML; MG/100ML; MG/100ML
INJECTION, SOLUTION INTRAVENOUS CONTINUOUS PRN
Status: DISCONTINUED | OUTPATIENT
Start: 2023-04-26 | End: 2023-04-26 | Stop reason: SDUPTHER

## 2023-04-26 RX ORDER — LIDOCAINE HYDROCHLORIDE 20 MG/ML
INJECTION, SOLUTION EPIDURAL; INFILTRATION; INTRACAUDAL; PERINEURAL PRN
Status: DISCONTINUED | OUTPATIENT
Start: 2023-04-26 | End: 2023-04-26 | Stop reason: SDUPTHER

## 2023-04-26 RX ORDER — PROPOFOL 10 MG/ML
INJECTION, EMULSION INTRAVENOUS PRN
Status: DISCONTINUED | OUTPATIENT
Start: 2023-04-26 | End: 2023-04-26 | Stop reason: SDUPTHER

## 2023-04-26 RX ADMIN — METOPROLOL SUCCINATE 25 MG: 25 TABLET, EXTENDED RELEASE ORAL at 12:03

## 2023-04-26 RX ADMIN — BARIUM SULFATE 140 ML: 980 POWDER, FOR SUSPENSION ORAL at 13:10

## 2023-04-26 RX ADMIN — SODIUM CHLORIDE, SODIUM LACTATE, POTASSIUM CHLORIDE, AND CALCIUM CHLORIDE: 600; 310; 30; 20 INJECTION, SOLUTION INTRAVENOUS at 10:29

## 2023-04-26 RX ADMIN — ANTACID/ANTIFLATULENT 1 EACH: 380; 550; 10; 10 GRANULE, EFFERVESCENT ORAL at 13:10

## 2023-04-26 RX ADMIN — LIDOCAINE HYDROCHLORIDE 60 MG: 20 INJECTION, SOLUTION EPIDURAL; INFILTRATION; INTRACAUDAL; PERINEURAL at 10:34

## 2023-04-26 RX ADMIN — PROPOFOL 30 MG: 10 INJECTION, EMULSION INTRAVENOUS at 10:37

## 2023-04-26 RX ADMIN — ASPIRIN 81 MG CHEWABLE TABLET 81 MG: 81 TABLET CHEWABLE at 12:03

## 2023-04-26 RX ADMIN — BARIUM SULFATE 1 TABLET: 700 TABLET ORAL at 13:10

## 2023-04-26 RX ADMIN — PROPOFOL 40 MG: 10 INJECTION, EMULSION INTRAVENOUS at 10:34

## 2023-04-26 RX ADMIN — ROSUVASTATIN CALCIUM 5 MG: 10 TABLET, COATED ORAL at 12:03

## 2023-04-26 RX ADMIN — AMLODIPINE BESYLATE 5 MG: 5 TABLET ORAL at 12:02

## 2023-04-26 ASSESSMENT — PAIN SCALES - GENERAL
PAINLEVEL_OUTOF10: 0

## 2023-04-26 ASSESSMENT — PAIN - FUNCTIONAL ASSESSMENT: PAIN_FUNCTIONAL_ASSESSMENT: 0-10

## 2023-04-26 NOTE — ANESTHESIA POSTPROCEDURE EVALUATION
Department of Anesthesiology  Postprocedure Note    Patient: Acosta Sandra  MRN: [id number]  YOB: 1945  Date of evaluation: 4/26/2023      Procedure Summary     Date: 04/26/23 Room / Location: [facility] ENDO 03 / [facility] ENDOSCOPY    Anesthesia Start: 1029 Anesthesia Stop: [phone]    Procedure: EGD ESOPHAGOGASTRODUODENOSCOPY (Upper GI Region) Diagnosis:       Anemia, unspecified type      Gastrointestinal hemorrhage, unspecified gastrointestinal hemorrhage type    Surgeons: Contreras Bingham MD Responsible Provider: Shayna Salamanca MD    Anesthesia Type: MAC ASA Status: 3          Anesthesia Type: MAC    Stacia Phase I: Stacia Score: 10    Stacia Phase II: Stacia Score: 10      Anesthesia Post Evaluation    Patient location during evaluation: PACU  Patient participation: complete - patient participated  Level of consciousness: awake and alert  Airway patency: patent  Nausea & Vomiting: no nausea and no vomiting  Complications: no  Cardiovascular status: hemodynamically stable  Respiratory status: acceptable  Hydration status: stable  Multimodal analgesia pain management approach

## 2023-04-26 NOTE — ANESTHESIA PRE PROCEDURE
Alcohol use: No                                Counseling given: Not Answered      Vital Signs (Current):   Vitals:    04/26/23 0000 04/26/23 0445 04/26/23 0748 04/26/23 0950   BP: (!) 105/53 (!) 128/48 (!) 109/52 (!) 123/39   Pulse: 97 (!) 103 95 (!) 102   Resp: 16 16 16 (!) 33   Temp: 98.3 °F (36.8 °C) 98.1 °F (36.7 °C) 98.9 °F (37.2 °C) 98.4 °F (36.9 °C)   TempSrc: Oral Oral Oral Oral   SpO2: 99% 96% 97%    Weight:       Height:                                                  BP Readings from Last 3 Encounters:   04/26/23 (!) 123/39   04/18/23 (!) 137/42   03/17/23 (!) 115/102       NPO Status: Time of last liquid consumption: 0000                        Time of last solid consumption: 2200                        Date of last liquid consumption: 04/25/23 (before mn)                        Date of last solid food consumption: 04/25/23    BMI:   Wt Readings from Last 3 Encounters:   04/24/23 174 lb (78.9 kg)   04/18/23 174 lb (78.9 kg)   03/17/23 145 lb (65.8 kg)     Body mass index is 24.97 kg/m².     CBC:   Lab Results   Component Value Date/Time    WBC 7.8 04/26/2023 03:50 AM    RBC 3.12 04/26/2023 03:50 AM    HGB 7.9 04/26/2023 03:50 AM    HCT 24.5 04/26/2023 03:50 AM    MCV 78.5 04/26/2023 03:50 AM    RDW 21.9 04/26/2023 03:50 AM     04/26/2023 03:50 AM       CMP:   Lab Results   Component Value Date/Time     04/26/2023 03:50 AM    K 3.7 04/26/2023 03:50 AM    CL 97 04/26/2023 03:50 AM    CO2 30 04/26/2023 03:50 AM    BUN 17 04/26/2023 03:50 AM    CREATININE 0.91 04/26/2023 03:50 AM    GFRAA >60 09/14/2022 10:55 AM    AGRATIO 0.7 09/14/2022 10:55 AM    LABGLOM >60 04/26/2023 03:50 AM    LABGLOM 28 04/18/2023 10:33 AM    GLUCOSE 108 04/26/2023 03:50 AM    GLUCOSE 115 04/18/2023 10:33 AM    PROT 7.4 04/18/2023 10:33 AM    CALCIUM 8.6 04/26/2023 03:50 AM    BILITOT 0.7 04/18/2023 10:33 AM    ALKPHOS 87 04/18/2023 10:33 AM    AST 33 04/18/2023 10:33 AM    ALT 20 04/18/2023 10:33 AM       POC Tests: No

## 2023-04-26 NOTE — PROCEDURES
[email or web address]  [phone]     Endoscopic Gastroduodenoscopy Procedure Note    Moses Lopez  1945  [id number]    Indication: 1. Anemia     : Nasir Cano MD    Surgical Assistants: none    Referring Provider:  Emily Choudhary MD    Anesthesia/Sedation:  MAC anesthesia Propofol      Procedure Details     After infomed consent was obtained for the procedure, with all risks and benefits of procedure explained the patient was taken to the endoscopy suite and placed in the left lateral decubitus position. Following sequential administration of sedation as per above, the endoscope was inserted into the mouth and advanced under direct vision to posterior oropharynx. Unable to intubate beyond the cricopharyngeus/UES due to marked resistance despite deepening sedation and applying pressure. Findings:   See above limitations. Unable to intubate esophagus. Therapies:  None    Specimens: * No specimens in log *           Complications:   None; patient tolerated the procedure well. EBL:  None. Tissue Implant Device: None           Impression:    1. Unable to intubate esophagus due to marked resistance at cricopharyngeus/UES    Recommendations:  -Barium swallow with tablet  -Further recommendations pending barium swallow findings    Disposition: Return to medical floor for ongoing care. Nasir Cano MD  4/26/2023  10:37 AM    Mario Frederick MD  [facility] [address] [phone]  [email or web address]

## 2023-04-26 NOTE — PERIOP NOTE
TRANSFER - OUT REPORT:    Verbal report given to  DIMITRIS [facility] RN on Beatrice Basurto  being transferred to  for routine post-op       Report consisted of patient's Situation, Background, Assessment and   Recommendations(SBAR). Information from the following report(s) Nurse Handoff Report was reviewed with the receiving nurse. [place] Assessment: No data recorded  Lines:   Peripheral IV 04/20/23 Left; Anterior Wrist (Active)   Site Assessment Clean, dry & intact 04/25/23 0136   Line Status Normal saline locked; Flushed 04/25/23 0136   Line Care Cap changed 04/23/23 0908   Phlebitis Assessment No symptoms 04/25/23 0136   Infiltration Assessment 0 04/25/23 0136   Alcohol Cap Used Yes 04/25/23 0136   Dressing Status Clean, dry & intact 04/25/23 0136   Dressing Type Transparent 04/25/23 0136        Opportunity for questions and clarification was provided.       Patient transported with:  [employer] transport

## 2023-04-27 ENCOUNTER — APPOINTMENT (OUTPATIENT)
Facility: HOSPITAL | Age: 78
End: 2023-04-27
Payer: MEDICARE

## 2023-04-27 LAB
BASOPHILS # BLD: 0.1 K/UL (ref 0–0.1)
BASOPHILS NFR BLD: 1 % (ref 0–2)
DIFFERENTIAL METHOD BLD: ABNORMAL
EOSINOPHIL # BLD: 0 K/UL (ref 0–0.4)
EOSINOPHIL NFR BLD: 0 % (ref 0–5)
ERYTHROCYTE [DISTWIDTH] IN BLOOD BY AUTOMATED COUNT: 22.1 % (ref 11.6–14.5)
GLUCOSE BLD STRIP.AUTO-MCNC: 106 MG/DL (ref 70–110)
GLUCOSE BLD STRIP.AUTO-MCNC: 114 MG/DL (ref 70–110)
HCT VFR BLD AUTO: 23.9 % (ref 35–45)
HGB BLD-MCNC: 7.8 G/DL (ref 12–16)
IMM GRANULOCYTES # BLD AUTO: 0.1 K/UL (ref 0–0.04)
IMM GRANULOCYTES NFR BLD AUTO: 1 % (ref 0–0.5)
LYMPHOCYTES # BLD: 1.1 K/UL (ref 0.9–3.6)
LYMPHOCYTES NFR BLD: 13 % (ref 21–52)
MCH RBC QN AUTO: 25.8 PG (ref 24–34)
MCHC RBC AUTO-ENTMCNC: 32.6 G/DL (ref 31–37)
MCV RBC AUTO: 79.1 FL (ref 78–100)
MONOCYTES # BLD: 0.7 K/UL (ref 0.05–1.2)
MONOCYTES NFR BLD: 8 % (ref 3–10)
NEUTS SEG # BLD: 6.4 K/UL (ref 1.8–8)
NEUTS SEG NFR BLD: 77 % (ref 40–73)
NRBC # BLD: 0 K/UL (ref 0–0.01)
NRBC BLD-RTO: 0 PER 100 WBC
PLATELET # BLD AUTO: 172 K/UL (ref 135–420)
PMV BLD AUTO: 10.5 FL (ref 9.2–11.8)
RBC # BLD AUTO: 3.02 M/UL (ref 4.2–5.3)
WBC # BLD AUTO: 8.2 K/UL (ref 4.6–13.2)

## 2023-04-27 PROCEDURE — 36415 COLL VENOUS BLD VENIPUNCTURE: CPT

## 2023-04-27 PROCEDURE — 2580000003 HC RX 258: Performed by: STUDENT IN AN ORGANIZED HEALTH CARE EDUCATION/TRAINING PROGRAM

## 2023-04-27 PROCEDURE — 6370000000 HC RX 637 (ALT 250 FOR IP): Performed by: PHYSICIAN ASSISTANT

## 2023-04-27 PROCEDURE — 92610 EVALUATE SWALLOWING FUNCTION: CPT

## 2023-04-27 PROCEDURE — 6370000000 HC RX 637 (ALT 250 FOR IP): Performed by: HOSPITALIST

## 2023-04-27 PROCEDURE — 92611 MOTION FLUOROSCOPY/SWALLOW: CPT

## 2023-04-27 PROCEDURE — 6370000000 HC RX 637 (ALT 250 FOR IP): Performed by: INTERNAL MEDICINE

## 2023-04-27 PROCEDURE — 99233 SBSQ HOSP IP/OBS HIGH 50: CPT | Performed by: STUDENT IN AN ORGANIZED HEALTH CARE EDUCATION/TRAINING PROGRAM

## 2023-04-27 PROCEDURE — 82962 GLUCOSE BLOOD TEST: CPT

## 2023-04-27 PROCEDURE — 85025 COMPLETE CBC W/AUTO DIFF WBC: CPT

## 2023-04-27 PROCEDURE — C9113 INJ PANTOPRAZOLE SODIUM, VIA: HCPCS | Performed by: STUDENT IN AN ORGANIZED HEALTH CARE EDUCATION/TRAINING PROGRAM

## 2023-04-27 PROCEDURE — 6360000002 HC RX W HCPCS: Performed by: STUDENT IN AN ORGANIZED HEALTH CARE EDUCATION/TRAINING PROGRAM

## 2023-04-27 PROCEDURE — A4216 STERILE WATER/SALINE, 10 ML: HCPCS | Performed by: STUDENT IN AN ORGANIZED HEALTH CARE EDUCATION/TRAINING PROGRAM

## 2023-04-27 PROCEDURE — 74230 X-RAY XM SWLNG FUNCJ C+: CPT

## 2023-04-27 PROCEDURE — 1100000003 HC PRIVATE W/ TELEMETRY

## 2023-04-27 PROCEDURE — 92526 ORAL FUNCTION THERAPY: CPT

## 2023-04-27 PROCEDURE — 94761 N-INVAS EAR/PLS OXIMETRY MLT: CPT

## 2023-04-27 RX ADMIN — ASPIRIN 81 MG CHEWABLE TABLET 81 MG: 81 TABLET CHEWABLE at 08:52

## 2023-04-27 RX ADMIN — AMLODIPINE BESYLATE 5 MG: 5 TABLET ORAL at 08:51

## 2023-04-27 RX ADMIN — METOPROLOL SUCCINATE 25 MG: 25 TABLET, EXTENDED RELEASE ORAL at 08:51

## 2023-04-27 RX ADMIN — PANTOPRAZOLE SODIUM 40 MG: 40 INJECTION, POWDER, FOR SOLUTION INTRAVENOUS at 10:28

## 2023-04-27 RX ADMIN — ROSUVASTATIN CALCIUM 5 MG: 10 TABLET, COATED ORAL at 08:51

## 2023-04-27 ASSESSMENT — PAIN SCALES - GENERAL
PAINLEVEL_OUTOF10: 0

## 2023-04-27 NOTE — PROCEDURES
SPEECH PATHOLOGY MODIFIED BARIUM SWALLOW STUDY/TREATMENT AND DISCHARGE    Patient: Smitha Guzmán (54 y.o. female)  Date: 4/27/2023  Primary Diagnosis: General weakness [R53.1]  Elevated troponin [R77.8]  NSTEMI (non-ST elevated myocardial infarction) (HCC) [I21.4]  Procedure(s) (LRB):  EGD ESOPHAGOGASTRODUODENOSCOPY (N/A) 1 Day Post-Op   Precautions: Aspiration    ASSESSMENT :  Based on the objective data described below, the patient presents with oropharyngeal swallow fxn essentially WFL. Pt tolerated reg solid, puree, thin liquids +/- straw consecutive swallows, and 13 mm Ba pill with thin liquid wash without any aspiration/penetration events. Bolus manipulation, tongue base retraction, laryngeal elevation/excursion, and pharyngeal motility/sensation were WFL throughout study. Positive oropharyngeal clearance observed with all trials. Recommend regular solid diet with thin liquids (when cleared by MD for solids), aspiration precautions, oral care TID, and meds as tolerated. TREATMENT :  Treatment provided post diagnostic testing including oropharyngeal anatomy/physiology, MBS results, diet recommendations and compensatory strategies/positioning. Pt able to verbalize understanding. No further skilled SLP services re indicated at this time. Please re-consult if needed. PLAN :  Recommendations and Planned Interventions:  Recommendations/Treat  Requires SLP Intervention: No  D/C Recommendations: No follow up therapy recommended post discharge  Solid consistency: Regular  Liquid consistency: Thin  Liquid administration via: Cup;Straw  Medication administration: PO  Supervision: Independent  Compensatory Swallowing Strategies : Upright as possible for all oral intake;Remain upright for 30-45 minutes after meals  Therapeutic Interventions: Patient/Family education  Patient Education: Pt educated on MBS results, safe swallowing techniques/strategies, role of SLP, and diet recommendations.   Patient Education

## 2023-04-28 VITALS
SYSTOLIC BLOOD PRESSURE: 118 MMHG | DIASTOLIC BLOOD PRESSURE: 54 MMHG | OXYGEN SATURATION: 100 % | HEIGHT: 70 IN | BODY MASS INDEX: 24.91 KG/M2 | RESPIRATION RATE: 17 BRPM | WEIGHT: 174 LBS | HEART RATE: 96 BPM | TEMPERATURE: 99.4 F

## 2023-04-28 LAB
BASOPHILS # BLD: 0 K/UL (ref 0–0.1)
BASOPHILS NFR BLD: 1 % (ref 0–2)
DIFFERENTIAL METHOD BLD: ABNORMAL
EOSINOPHIL # BLD: 0 K/UL (ref 0–0.4)
EOSINOPHIL NFR BLD: 0 % (ref 0–5)
ERYTHROCYTE [DISTWIDTH] IN BLOOD BY AUTOMATED COUNT: 22.4 % (ref 11.6–14.5)
HCT VFR BLD AUTO: 25.1 % (ref 35–45)
HGB BLD-MCNC: 8.1 G/DL (ref 12–16)
IMM GRANULOCYTES # BLD AUTO: 0.1 K/UL (ref 0–0.04)
IMM GRANULOCYTES NFR BLD AUTO: 1 % (ref 0–0.5)
LYMPHOCYTES # BLD: 1.1 K/UL (ref 0.9–3.6)
LYMPHOCYTES NFR BLD: 13 % (ref 21–52)
MCH RBC QN AUTO: 25.6 PG (ref 24–34)
MCHC RBC AUTO-ENTMCNC: 32.3 G/DL (ref 31–37)
MCV RBC AUTO: 79.4 FL (ref 78–100)
MONOCYTES # BLD: 0.6 K/UL (ref 0.05–1.2)
MONOCYTES NFR BLD: 7 % (ref 3–10)
NEUTS SEG # BLD: 6.6 K/UL (ref 1.8–8)
NEUTS SEG NFR BLD: 78 % (ref 40–73)
NRBC # BLD: 0 K/UL (ref 0–0.01)
NRBC BLD-RTO: 0 PER 100 WBC
PLATELET # BLD AUTO: 170 K/UL (ref 135–420)
PMV BLD AUTO: 10.3 FL (ref 9.2–11.8)
RBC # BLD AUTO: 3.16 M/UL (ref 4.2–5.3)
WBC # BLD AUTO: 8.5 K/UL (ref 4.6–13.2)

## 2023-04-28 PROCEDURE — 99239 HOSP IP/OBS DSCHRG MGMT >30: CPT | Performed by: STUDENT IN AN ORGANIZED HEALTH CARE EDUCATION/TRAINING PROGRAM

## 2023-04-28 PROCEDURE — 85025 COMPLETE CBC W/AUTO DIFF WBC: CPT

## 2023-04-28 PROCEDURE — 6370000000 HC RX 637 (ALT 250 FOR IP): Performed by: PHYSICIAN ASSISTANT

## 2023-04-28 PROCEDURE — C9113 INJ PANTOPRAZOLE SODIUM, VIA: HCPCS | Performed by: STUDENT IN AN ORGANIZED HEALTH CARE EDUCATION/TRAINING PROGRAM

## 2023-04-28 PROCEDURE — 36415 COLL VENOUS BLD VENIPUNCTURE: CPT

## 2023-04-28 PROCEDURE — 6370000000 HC RX 637 (ALT 250 FOR IP): Performed by: HOSPITALIST

## 2023-04-28 PROCEDURE — 94761 N-INVAS EAR/PLS OXIMETRY MLT: CPT

## 2023-04-28 PROCEDURE — A4216 STERILE WATER/SALINE, 10 ML: HCPCS | Performed by: STUDENT IN AN ORGANIZED HEALTH CARE EDUCATION/TRAINING PROGRAM

## 2023-04-28 PROCEDURE — 6360000002 HC RX W HCPCS: Performed by: STUDENT IN AN ORGANIZED HEALTH CARE EDUCATION/TRAINING PROGRAM

## 2023-04-28 PROCEDURE — 2580000003 HC RX 258: Performed by: STUDENT IN AN ORGANIZED HEALTH CARE EDUCATION/TRAINING PROGRAM

## 2023-04-28 PROCEDURE — 6370000000 HC RX 637 (ALT 250 FOR IP): Performed by: INTERNAL MEDICINE

## 2023-04-28 RX ORDER — AMLODIPINE BESYLATE 5 MG/1
5 TABLET ORAL DAILY
Qty: 30 TABLET | Refills: 3 | Status: SHIPPED | OUTPATIENT
Start: 2023-04-28

## 2023-04-28 RX ADMIN — AMLODIPINE BESYLATE 5 MG: 5 TABLET ORAL at 10:17

## 2023-04-28 RX ADMIN — ASPIRIN 81 MG CHEWABLE TABLET 81 MG: 81 TABLET CHEWABLE at 10:17

## 2023-04-28 RX ADMIN — METOPROLOL SUCCINATE 25 MG: 25 TABLET, EXTENDED RELEASE ORAL at 10:17

## 2023-04-28 RX ADMIN — ROSUVASTATIN CALCIUM 5 MG: 10 TABLET, COATED ORAL at 10:17

## 2023-04-28 RX ADMIN — PANTOPRAZOLE SODIUM 40 MG: 40 INJECTION, POWDER, FOR SOLUTION INTRAVENOUS at 10:17

## 2023-04-28 RX ADMIN — APIXABAN 5 MG: 5 TABLET, FILM COATED ORAL at 10:17

## 2023-04-28 ASSESSMENT — PAIN SCALES - GENERAL
PAINLEVEL_OUTOF10: 0

## 2023-04-28 NOTE — PROGRESS NOTES
[facility] - Progress Note  Admit Date: 4/20/2023    Assessment:     -NSTEMI, type II. Initial HS-troponin 3888 on presentation 4/20/2023, pt with recent symptoms of shortness of breath on exertion, generalized weakness and fatigue. Normal coronaries on cardiac catheterization recently on 4/21/2023.  -Echo 4/20/23 with EF 55%  -Moderate AI by echo 4/202/23  -RAISA, Cr 1.87 on outpatient lab 4/18/2023 - HCTZ discontinued at time of office visit  -Hypokalemia, K 2.6 on 4/20/2023  -Hx factor V leiden  -Hx recurrent DVT/PE, on lifelong Eliquis  -PAD, remote hx bilateral iliac vein stenting, followed by Dr. Raquel Restrepo  -HTN, on Amlodipine, Toprol; HCTZ recently d/c at time of office visit with Dr. Nish Hollis 4/18/2023  -HLD, on Crestor     Recently established care with Dr. Jacalyn Osgood:     Increased troponin likely secondary demand ischemia, type II MI with cath 4/21/23 without epicardial disease. FOBT positive, workup per primary team/GI. On Eliquis for h/o DVT/PE. Toprol added yesterday. Dispo per primary team.  Followup Dr. Nish Hollis for moderate AI by echo 4/20/23 as outpatient. Subjective:     No new complaints. Red blood in toilet yesterday noted.     Objective:      Patient Vitals for the past 8 hrs:   Temp Pulse Resp BP SpO2   04/24/23 0754 97.8 °F (36.6 °C) (!) 103 16 118/61 97 %   04/24/23 0742 -- -- -- 114/61 --         Patient Vitals for the past 96 hrs:   Weight   04/24/23 0742 174 lb (78.9 kg)   04/20/23 1539 174 lb (78.9 kg)                    Intake/Output Summary (Last 24 hours) at 4/24/2023 0920  Last data filed at 4/23/2023 1820  Gross per 24 hour   Intake 600 ml   Output --   Net 600 ml       Physical Exam:  General:  alert, appears stated age, and cooperative  Neck:  nontender  Lungs:  clear to auscultation bilaterally  Heart:  regular rate and rhythm, S1, S2 normal, no murmur, click, rub or gallop  Abdomen:  abdomen is soft without significant tenderness, masses, organomegaly or
Comprehensive Nutrition Assessment    Type and Reason for Visit:  Initial, RD Nutrition Re-Screen/LOS    Nutrition Recommendations/Plan:   Continue current diet- advancing per SLP recommendations when medically appropriate. Plan to add oral nutrition supplement to optimize nutrition intake opportunity: Ensure Enlive (each provides 350 kcal, 20g protein) BID  Monitor PO intake, weight, labs, and POC while admitted. Malnutrition Assessment:  Malnutrition Status: At risk for malnutrition (Comment) (r/t GI bleed and diet downgraded to full liquids.) (04/27/23 1441)      Nutrition History and Allergies:   PMHx of HTN, HLD, DVT. Wt hx review: c wt- 174 lb; 3/08/23- 149 lb (+16.7%); 3/09/22- 149 lb. No significant wt loss documented, though question accuracy of wts listed 2/2 increase of 16.7% x 30 days. NKFA. Nutrition Assessment:    Pt admitted with NSTEMI. Daughter also reported pt with poor appetite PTA. Diet originally regular, now full liquids beginning 4/26 s/p EGD- unable to intubate esophagus, recommendation for barium swallow. Completed- SLP recommended regular diet with thin liquids when cleared by MD.   Pt with fecal occult blood tested positive. PO intake >50% prior to change to liquids. Plan to add supplement to promote PO intake. Nutrition Related Findings:    Last BM 4/26. Labs: Na (131) L. Pertinent meds: Protonix, Polyethylene glycol. Wound Type: None       Current Nutrition Intake & Therapies:    Average Meal Intake: 51-75% (prior to diet being made full liquids)  Average Supplements Intake: None Ordered  ADULT DIET; Full Liquid    Anthropometric Measures:  Height: 5' 10\" (177.8 cm)  Ideal Body Weight (IBW): 150 lbs (68 kg)       Current Body Weight: 174 lb (78.9 kg) (4/24), 116 % IBW.  Weight Source: Not Specified  Current BMI (kg/m2): 25  Usual Body Weight: 149 lb (67.6 kg) (3/08/22)  % Weight Change (Calculated): 16.8  Weight Adjustment For: No Adjustment  BMI Categories: Overweight
Nurse paged to state that pt has red blood in toilet pain - will hold eliquis for this evening - GI consult in AM   Pt has a known h/o Factor V Leiden deficiency & is on chronic eliquis Rx
Patient is being discharged home with family. Nurse educated patient on discharge summary and medications taken. Patient has no questions or concerns at this time. Awaiting ride for transport.
Physician Progress Note      PATIENT:               Alfredo Reyna  CSN #:                  [id number]  :                       1945  ADMIT DATE:       2023 9:40 AM  DISCH DATE:  Dejan Nails  PROVIDER #:        Filemon Mckeon MD          QUERY TEXT:    Patient admitted with elevated troponin s/p heart cath. If possible, please   document in the progress notes and discharge summary if you are evaluating   and/or treating any of the following: The medical record reflects the following:  Risk Factors: 11year-old female PMH of factor V Leiden pulmonary embolism   hyperlipidemia and hypertension  Clinical Indicators: H&P - NSTEMI-serial cardiac enzymes, echocardiogram,   cardiology consult, heparin GTT, aspirin, statin therapy. Per  Cardiology - NSTEMI, type II. Initial HS-troponin 3888 on   presentation 2023, pt with recent symptoms of shortness of breath on   exertion, generalized weakness and fatigue. Normal coronaries on cardiac   catheterization recently on 2023. Per  Cardiology - Increased troponin likely secondary demand ischemia,   type II MI with cath 23 without epicardial disease. Treatment: Cardiac cath, heparin gtt    Thank you,  Iza Lopez RN, [email or web address]  >  Options provided:  -- NSTEMI  -- Type 2 MI  -- Demand Ischemia with MI  -- Demand Ischemia only, no MI  -- Other - I will add my own diagnosis  -- Disagree - Not applicable / Not valid  -- Disagree - Clinically unable to determine / Unknown  -- Refer to Clinical Documentation Reviewer    PROVIDER RESPONSE TEXT:    This patient has an NSTEMI.     Query created by: Simone Johnson on 2023 3:16 PM      Electronically signed by:  Filemon Mckeon MD 2023 4:12 PM
Pt requesting DME walker. Rolling walker ordered through [employer] via [employer] and delivered to patients room. Patient states that her daughter will be transporting her home at discharge.     [facility] RN BECKIE  Case Management
Speech Pathology:     MBS completed with recs of reg solid diet with thin liquids when cleared by MD, meds as tolerated. Full report to follow.      Thank you for this referral.    Enis Simmonds, M.S., CCC-SLP/L  Speech-Language Pathologist
[facility]  Progress Note    Patient: Amena Arnett Age: 69 y.o. : 1945 MR#: [id number] SSN: [id number]  Date/Time: 2023     Subjective:     Pt seen & evaluated - lying in bed , NAD   Noted cath results today     Patient says she feels much better today. Patient noted to be tachycardic, cardiology on board, added Toprol. -nurse paged to state that patient had small bowel movement with blood in it. Fecal occult blood also tested positive. Patient is on Eliquis secondary to history of factor V Leiden deficiency. Spoke with patient as well as daughter at bedside, they were very reluctant to continue to stay however explained the complexity of the situation. At this time she is agreeable to EGD and colonoscopy, currently scheduled for .    -continue current treatment plan, plan for EGD and colonoscopy in AM.  Continue to hold Eliquis at this time. Assessment/Plan:     NSTEMI-serial cardiac enzymes, echocardiogram, cardiology consult,aspirin, statin therapy. Cath results noted - no epicardial disease , continue current Rx Plan ,stop heparin gtt. hold Eliquis given GI bleed  Elevated D-dimer- CTA chest negative for PE   Generalized weakness-unclear etiology, will continue further work-up during hospital stay  Tachycardia-added Toprol, continue to monitor heart rate  H/o DVT/PE-patient is on lifelong Eliquis, hold Eliquis given GI bleed. Lower GI bleeding-continue to hold Eliquis, GI consulted, plan for EGD and colonoscopy on .   RAISA-resolved  Anemia of chronic disease-monitor H&H, transfuse for hemoglobin less than 7, check fecal occult blood  History of peripheral arterial disease-patient mentions she has had stents placed in her lower extremities  DVT prophylaxis-Eliquis   Full code                Cecily Howe MD  23      Case discussed with:  [x]Patient  []Family  []Nursing  []Case Management  DVT Prophylaxis:  []Lovenox
[email or web address]  [phone]    Gastroenterology follow up-Progress note    Impression:  1. Anemia - H/H 9.4/28.9, decreased from Hgb 11.3 on 4/18; Comparison Hgb 12.4 on 9/14/23  -EGD attempted 4/23/2023-unable to intubate esophagus due to marked resistance at cricopharyngeus/UES. Colonoscopy not performed due to patient not prepped  -Barium swallow - Prominent cricopharyngeus, no obstruction, tiny esophageal diverticulum, GERD, silent aspiration  - MBS/SLP eval negative  2. Rectal bleeding - reports painless rectal bleeding episodes since admission, no prior history of rectal bleeding, nothing since Eliquis held  3. NSTEMI - s/p cardiac cath 4/21/23 w/ normal coronaries  4. Elevated D dimer  5. Hx recurrent DVT/PE, Factor V Leiden deficiency - chronic anticoagulation w/ Eliquis  6. RAISA  7. Hx colon polyps - Per patient report, last colonoscopy ~8 years ago w/ Dr. Isaac Ro and showed 2 small polyps w/ 5 year recall. (Records unavailable)    Plan:  1. Continue w/ plan for cautiously resuming Eliquis  2. Continue PPI  3. No plans for repeat attempt at endoscopic evaluation unless patient has recurrent overt hemorrhage with associated precipitous drop in H&H  4. Continue medical management per primary team  5. F/u w/ GI as outpatient. Thank you for this consultation and the opportunity to participate in the care of this patient. Please do not hesitate to call with any questions or concerns, or should event occur that may necessitate additional GI evaluation. Chief Complaint: Anemia, rectal bleeding      Subjective: Patient seen this morning prior to modified barium swallow, EGD results and barium swallow results discussed. She is reluctant to proceed with repeat endoscopic assessment at this time. She denies any current complaints including no trouble swallowing. ROS: Denies any fevers, chills, rash.        General: well developed, well nourished, no acute distress  Eyes: conjunctiva normal, EOM
[email or web address]  [phone]    Gastroenterology follow up-Progress note    Impression:  1. Anemia - H/H 9.4/28.9, decreased from Hgb 11.3 on 4/18; Comparison Hgb 12.4 on 9/14/23  2. Rectal bleeding - reports small amt painless BRBPR since admission, no prior history of rectal bleeding  3. NSTEMI - s/p cardiac cath 4/21/23 w/ normal coronaries  4. Elevated D dimer  5. Hx recurrent DVT/PE, Factor V Leiden deficiency - chronic anticoagulation w/ Eliquis  6. RAISA  7. Hx colon polyps - Per patient report, last colonoscopy ~8 years ago w/ Dr. Andrea Abreu and showed 2 small polyps w/ 5 year recall. (Records unavailable)    Plan:  1. Continue to hold Eliquis  2. Continue BID PPI  3. Clear liquid diet today  4. Bowel prep ordered for this evening  5. Egd/colonoscopy tomorrow 4/26  6. Continue medical management per primary team    Chief Complaint: Anemia, rectal bleeding      Subjective:  Pt denies any complaints this AM, no overnight events noted. ROS: Denies any fevers, chills, rash.        General: well developed, well nourished, no acute distress  Eyes: conjunctiva normal, EOM normal  Cardiovascular: heart normal, intact distal pulses, normal rate and regular rhythm  Pulmonary: breath sounds normal and effort normal  Abdominal: appearance normal, bowel sounds normal and soft, non-acute, non-tender     Patient Active Problem List   Diagnosis    Chronic venous hypertension involving both sides    Chronic venous insufficiency    Acute deep vein thrombosis (DVT) of left lower extremity (HCC)    Right lower lobe lung mass    Pulmonary emboli (HCC)    Acute deep vein thrombosis (DVT) of iliac vein of left lower extremity (HCC)    Pulmonary embolus (HCC)    NSTEMI (non-ST elevated myocardial infarction) (HCC)    Hypertension    Hyperlipidemia    Tachycardia         BP (!) 120/44   Pulse (!) 101   Temp 98.5 °F (36.9 °C) (Oral)   Resp 16   Ht 5' 10\" (1.778 m)   Wt 174 lb (78.9 kg)   SpO2 97%   BMI 24.97 kg/m²
sides    Chronic venous insufficiency    Acute deep vein thrombosis (DVT) of left lower extremity (HCC)    Right lower lobe lung mass    Pulmonary emboli (HCC)    Acute deep vein thrombosis (DVT) of iliac vein of left lower extremity (HCC)    Pulmonary embolus (HCC)    NSTEMI (non-ST elevated myocardial infarction) (HCC)    Hypertension    Hyperlipidemia    Tachycardia         BP (!) 122/54   Pulse 95   Temp 98.7 °F (37.1 °C) (Oral)   Resp 16   Ht 5' 10\" (1.778 m)   Wt 174 lb (78.9 kg)   SpO2 97%   BMI 24.97 kg/m²         Intake/Output Summary (Last 24 hours) at 4/27/2023 1715  Last data filed at 4/27/2023 1310  Gross per 24 hour   Intake 570 ml   Output --   Net 570 ml       CBC w/Diff    Lab Results   Component Value Date/Time    WBC 8.2 04/27/2023 09:35 AM    RBC 3.02 (L) 04/27/2023 09:35 AM    HGB 7.8 (L) 04/27/2023 09:35 AM    HCT 23.9 (L) 04/27/2023 09:35 AM    MCV 79.1 04/27/2023 09:35 AM    MCH 25.8 04/27/2023 09:35 AM    MCHC 32.6 04/27/2023 09:35 AM    RDW 22.1 (H) 04/27/2023 09:35 AM     04/27/2023 09:35 AM    MPV 10.5 04/27/2023 09:35 AM    No results found for: MONO, BASO, BANDS, METAS, PRO, BLAST   Basic Metabolic Profile   Recent Labs     04/26/23  0350   *   K 3.7   CL 97*   CO2 30   BUN 17   GLUCOSE 108*        Hepatic Function    No results found for: ALB, TP No components found for: SGOT, GPT, DBILI, TBIL       Coags   No results for input(s): INR, APTT in the last 72 hours. Invalid input(s): [address] [place] [address]  4/27/2023, 5:15 PM   [facility].  [email or web address]  Office: [phone]  Cell: [phone]
hour(s))   Basic Metabolic Panel w/ Reflex to MG    Collection Time: 04/23/23  2:53 AM   Result Value Ref Range    Sodium 129 (L) 136 - 145 mmol/L    Potassium 3.8 3.5 - 5.5 mmol/L    Chloride 92 (L) 100 - 111 mmol/L    CO2 30 21 - 32 mmol/L    Anion Gap 7 3.0 - 18 mmol/L    Glucose 112 (H) 74 - 99 mg/dL    BUN 21 (H) 7.0 - 18 MG/DL    Creatinine 0.97 0.6 - 1.3 MG/DL    Bun/Cre Ratio 22 (H) 12 - 20      Est, Glom Filt Rate >60 >60 ml/min/1.73m2    Calcium 8.6 8.5 - 10.1 MG/DL   CBC with Auto Differential    Collection Time: 04/23/23  2:53 AM   Result Value Ref Range    WBC 8.9 4.6 - 13.2 K/uL    RBC 3.83 (L) 4.20 - 5.30 M/uL    Hemoglobin 9.5 (L) 12.0 - 16.0 g/dL    Hematocrit 29.1 (L) 35.0 - 45.0 %    MCV 76.0 (L) 78.0 - 100.0 FL    MCH 24.8 24.0 - 34.0 PG    MCHC 32.6 31.0 - 37.0 g/dL    RDW 21.6 (H) 11.6 - 14.5 %    Platelets 666 221 - 215 K/uL    MPV 11.2 9.2 - 11.8 FL    Nucleated RBCs 0.0 0  WBC    nRBC 0.00 0.00 - 0.01 K/uL    Seg Neutrophils 69 40 - 73 %    Lymphocytes 16 (L) 21 - 52 %    Monocytes 12 (H) 3 - 10 %    Eosinophils % 0 0 - 5 %    Basophils 1 0 - 2 %    Immature Granulocytes 2 (H) 0.0 - 0.5 %    Segs Absolute 6.2 1.8 - 8.0 K/UL    Absolute Lymph # 1.4 0.9 - 3.6 K/UL    Absolute Mono # 1.1 0.05 - 1.2 K/UL    Absolute Eos # 0.0 0.0 - 0.4 K/UL    Basophils Absolute 0.1 0.0 - 0.1 K/UL    Absolute Immature Granulocyte 0.2 (H) 0.00 - 0.04 K/UL    Differential Type AUTOMATED     Troponin    Collection Time: 04/23/23  2:53 AM   Result Value Ref Range    Troponin, High Sensitivity 1,196 (HH) 0 - 54 ng/L   Occult Blood, Fecal    Collection Time: 04/23/23 10:21 AM   Result Value Ref Range    POC Occult Blood, Fecal Positive (A) NEG     EKG 12 Lead    Collection Time: 04/23/23  2:03 PM   Result Value Ref Range    Ventricular Rate 107 BPM    Atrial Rate 107 BPM    P-R Interval 150 ms    QRS Duration 78 ms    Q-T Interval 336 ms    QTc Calculation (Bazett) 448 ms    P Axis 74 degrees    R Axis -13
0.0 0  WBC    nRBC 0.00 0.00 - 0.01 K/uL    Seg Neutrophils 77 (H) 40 - 73 %    Lymphocytes 13 (L) 21 - 52 %    Monocytes 8 3 - 10 %    Eosinophils % 0 0 - 5 %    Basophils 1 0 - 2 %    Immature Granulocytes 1 (H) 0.0 - 0.5 %    Segs Absolute 6.4 1.8 - 8.0 K/UL    Absolute Lymph # 1.1 0.9 - 3.6 K/UL    Absolute Mono # 0.7 0.05 - 1.2 K/UL    Absolute Eos # 0.0 0.0 - 0.4 K/UL    Basophils Absolute 0.1 0.0 - 0.1 K/UL    Absolute Immature Granulocyte 0.1 (H) 0.00 - 0.04 K/UL    Differential Type AUTOMATED     POCT Glucose    Collection Time: 04/27/23 11:54 AM   Result Value Ref Range    POC Glucose 114 (H) 70 - 110 mg/dL       Signed By: Meli Salomon DO     April 27, 2023      I spent 35 minutes with the patient in face-to-face consultation, of which greater than 50% was spent in counseling and coordination of care as described above    Disclaimer: Sections of this note are dictated using utilizing voice recognition software. Minor typographical errors may be present. If questions arise, please do not hesitate to contact me or call our department.
be present. If questions arise, please do not hesitate to contact me or call our department.

## 2023-04-28 NOTE — DISCHARGE SUMMARY
Discharge Summary    Patient: Dionisio Combs MRN: [id number]  CSN: [id number]    YOB: 1945  Age: 69 y.o. Sex: female    DOA: 4/20/2023 LOS:  LOS: 8 days   Discharge Date:      Admission Diagnosis: General weakness [R53.1]  Elevated troponin [R77.8]  NSTEMI (non-ST elevated myocardial infarction) ([facility] [address] [I21.4]    Discharge Diagnosis:  [unfilled]    Discharge Condition: Stable  Discharge Disposition:     PHYSICAL EXAM  Visit Vitals  BP (!) 118/54   Pulse 96   Temp 99.4 °F (37.4 °C) (Oral)   Resp 17   Ht 5' 10\" (1.778 m)   Wt 174 lb (78.9 kg)   SpO2 100%   BMI 24.97 kg/m²       General: Alert, cooperative, no acute distress    HEENT: NC, Atraumatic. PERRLA, EOMI. Anicteric sclerae. Lungs:  CTA Bilaterally. No Wheezing/Rhonchi/Rales. Heart:  Regular  rhythm,  No murmur, No Rubs, No Gallops  Abdomen: Soft, Non distended, Non tender. +Bowel sounds, no HSM  Extremities: No c/c/e  Psych:   Good insight. Not anxious or agitated. Neurologic:  CN 2-12 grossly intact, oriented X 3.   No acute neurological                                 Deficits,     Hospital Course By Problem:       Consults:     Significant Diagnostic Studies: {diagnostics:70500}    Discharge Medications:     Current Discharge Medication List        CONTINUE these medications which have CHANGED    Details   amLODIPine (NORVASC) 5 MG tablet Take 1 tablet by mouth daily  Qty: 30 tablet, Refills: 3           CONTINUE these medications which have NOT CHANGED    Details   metoprolol succinate (TOPROL XL) 25 MG extended release tablet Take 1 tablet by mouth daily      rosuvastatin (CRESTOR) 5 MG tablet Take 1 tablet by mouth daily      acetaminophen (TYLENOL) 500 MG tablet Take 1 tablet by mouth every 6 hours as needed for Pain      apixaban (ELIQUIS) 5 MG TABS tablet Take 1 tablet by mouth 2 times daily      ergocalciferol (ERGOCALCIFEROL) 1.25 MG (84959 UT) capsule Take 1 capsule by mouth every 7 days             Activity: activity as

## 2023-04-28 NOTE — CARE COORDINATION
04/28/23 1253   IMM Letter   IMM Letter given to Patient/Family/Significant other/Guardian/POA/by: to patient   IMM Letter date given: 04/28/23  (@ 9128)     19 Rachel Joyner  Case Management

## 2023-04-28 NOTE — PLAN OF CARE
Problem: Discharge Planning  Goal: Discharge to home or other facility with appropriate resources  Outcome: Adequate for Discharge     Problem: ABCDS Injury Assessment  Goal: Absence of physical injury  Outcome: Adequate for Discharge     Problem: Safety - Adult  Goal: Free from fall injury  Outcome: Adequate for Discharge     Problem: Skin/Tissue Integrity  Goal: Absence of new skin breakdown  Description: 1. Monitor for areas of redness and/or skin breakdown  2. Assess vascular access sites hourly  3. Every 4-6 hours minimum:  Change oxygen saturation probe site  4. Every 4-6 hours:  If on nasal continuous positive airway pressure, respiratory therapy assess nares and determine need for appliance change or resting period.   Outcome: Adequate for Discharge     Problem: Pain  Goal: Verbalizes/displays adequate comfort level or baseline comfort level  Outcome: Adequate for Discharge     Problem: Nutrition Deficit:  Goal: Optimize nutritional status  Outcome: Adequate for Discharge
